# Patient Record
Sex: MALE | Race: ASIAN | Employment: OTHER | URBAN - METROPOLITAN AREA
[De-identification: names, ages, dates, MRNs, and addresses within clinical notes are randomized per-mention and may not be internally consistent; named-entity substitution may affect disease eponyms.]

---

## 2018-11-16 ENCOUNTER — APPOINTMENT (OUTPATIENT)
Dept: LAB | Age: 43
End: 2018-11-16
Attending: INTERNAL MEDICINE
Payer: COMMERCIAL

## 2018-11-16 ENCOUNTER — OFFICE VISIT (OUTPATIENT)
Dept: INTERNAL MEDICINE CLINIC | Facility: CLINIC | Age: 43
End: 2018-11-16
Payer: COMMERCIAL

## 2018-11-16 VITALS
WEIGHT: 154 LBS | HEART RATE: 64 BPM | SYSTOLIC BLOOD PRESSURE: 120 MMHG | RESPIRATION RATE: 12 BRPM | TEMPERATURE: 98 F | BODY MASS INDEX: 28.34 KG/M2 | HEIGHT: 62 IN | DIASTOLIC BLOOD PRESSURE: 72 MMHG

## 2018-11-16 DIAGNOSIS — E78.5 HYPERLIPIDEMIA, UNSPECIFIED HYPERLIPIDEMIA TYPE: ICD-10-CM

## 2018-11-16 DIAGNOSIS — I10 ESSENTIAL HYPERTENSION: Primary | ICD-10-CM

## 2018-11-16 DIAGNOSIS — R73.01 IMPAIRED FASTING GLUCOSE: ICD-10-CM

## 2018-11-16 DIAGNOSIS — I10 ESSENTIAL HYPERTENSION: ICD-10-CM

## 2018-11-16 PROCEDURE — 80061 LIPID PANEL: CPT | Performed by: INTERNAL MEDICINE

## 2018-11-16 PROCEDURE — 80053 COMPREHEN METABOLIC PANEL: CPT | Performed by: INTERNAL MEDICINE

## 2018-11-16 PROCEDURE — 99204 OFFICE O/P NEW MOD 45 MIN: CPT | Performed by: INTERNAL MEDICINE

## 2018-11-16 PROCEDURE — 83036 HEMOGLOBIN GLYCOSYLATED A1C: CPT

## 2018-11-16 PROCEDURE — 84443 ASSAY THYROID STIM HORMONE: CPT

## 2018-11-16 PROCEDURE — 36415 COLL VENOUS BLD VENIPUNCTURE: CPT

## 2018-11-16 PROCEDURE — 85025 COMPLETE CBC W/AUTO DIFF WBC: CPT | Performed by: INTERNAL MEDICINE

## 2018-11-16 RX ORDER — AMLODIPINE BESYLATE 5 MG/1
5 TABLET ORAL DAILY
COMMUNITY
End: 2018-11-16

## 2018-11-16 RX ORDER — SIMVASTATIN 10 MG
10 TABLET ORAL NIGHTLY
COMMUNITY
End: 2018-11-16

## 2018-11-16 RX ORDER — SIMVASTATIN 10 MG
10 TABLET ORAL NIGHTLY
Qty: 30 TABLET | Refills: 3 | Status: SHIPPED | OUTPATIENT
Start: 2018-11-16 | End: 2019-04-07

## 2018-11-16 RX ORDER — LOSARTAN POTASSIUM 50 MG/1
50 TABLET ORAL DAILY
Qty: 30 TABLET | Refills: 3 | Status: SHIPPED | OUTPATIENT
Start: 2018-11-16 | End: 2019-03-08

## 2018-11-16 RX ORDER — LOSARTAN POTASSIUM 50 MG/1
TABLET ORAL DAILY
COMMUNITY
End: 2018-11-16

## 2018-11-16 RX ORDER — AMLODIPINE BESYLATE 5 MG/1
5 TABLET ORAL DAILY
Qty: 30 TABLET | Refills: 3 | Status: SHIPPED | OUTPATIENT
Start: 2018-11-16 | End: 2019-03-08

## 2018-11-16 NOTE — PROGRESS NOTES
[facility]    CHIEF COMPLAINT:  Patient presents with:  Medication Follow-Up: regarding simvastatin, amlodopine and Losartan. [facility] PCP here in [address], doesn't remember name. Last OV was 4 months ago.    Pt thinks last tetanus was 10/17  Imm/Inj: dec Single      Spouse name: Not on file      Number of children: Not on file      Years of education: Not on file      Highest education level: Not on file    Social Needs      Financial resource strain: Not on file      Food insecurity - worry: Not on file denies dysuria  MUSCULOSKELETAL: denies back pain  NEURO: denies headaches  PSYCHE: denies depression or anxiety  HEMATOLOGIC: denies hx of anemia  ENDOCRINE: denies thyroid history  ALL/ASTHMA: denies hx of allergy or asthma        EXERCISE ASSESSMENT AND Impaired fasting glucose  Will check a1c  Continue regular exercise.    Low carb diet.   - HEMOGLOBIN A1C; Future    RTC 3 months for med check    Oscar Cid MD

## 2018-11-28 ENCOUNTER — OFFICE VISIT (OUTPATIENT)
Dept: INTERNAL MEDICINE CLINIC | Facility: CLINIC | Age: 43
End: 2018-11-28
Payer: COMMERCIAL

## 2018-11-28 VITALS
HEART RATE: 80 BPM | RESPIRATION RATE: 12 BRPM | DIASTOLIC BLOOD PRESSURE: 78 MMHG | BODY MASS INDEX: 28.71 KG/M2 | TEMPERATURE: 98 F | WEIGHT: 156 LBS | SYSTOLIC BLOOD PRESSURE: 136 MMHG | HEIGHT: 62 IN

## 2018-11-28 DIAGNOSIS — M79.652 LEFT THIGH PAIN: Primary | ICD-10-CM

## 2018-11-28 PROCEDURE — 99213 OFFICE O/P EST LOW 20 MIN: CPT | Performed by: INTERNAL MEDICINE

## 2018-11-28 NOTE — PROGRESS NOTES
[facility]    CHIEF COMPLAINT:  Patient presents with:  Leg Pain: was experiening pain in left thigh on Monday. Pt experiencing minor pain at this time. Pt took Gretta Walter for the pain.    Note For Work: didn't go to work on Tuesday today, or tomorrow HgbA1C 4.4 <5.7 %    Estimated Average Glucose 80 68 - 126 mg/dL            ASSESSMENT AND PLAN:  1. Left thigh pain  Likely musculoskeletal strain. Improved pain now. Okay to go back to work. Monitor. rtc if worsens.    He knows in the future to com

## 2018-12-03 ENCOUNTER — TELEPHONE (OUTPATIENT)
Dept: INTERNAL MEDICINE CLINIC | Facility: CLINIC | Age: 43
End: 2018-12-03

## 2018-12-19 ENCOUNTER — TELEPHONE (OUTPATIENT)
Dept: INTERNAL MEDICINE CLINIC | Facility: CLINIC | Age: 43
End: 2018-12-19

## 2018-12-19 DIAGNOSIS — R74.8 ELEVATED LIVER ENZYMES: Primary | ICD-10-CM

## 2018-12-20 ENCOUNTER — APPOINTMENT (OUTPATIENT)
Dept: LAB | Age: 43
End: 2018-12-20
Attending: INTERNAL MEDICINE
Payer: COMMERCIAL

## 2018-12-20 PROCEDURE — 36415 COLL VENOUS BLD VENIPUNCTURE: CPT | Performed by: INTERNAL MEDICINE

## 2018-12-20 PROCEDURE — 80076 HEPATIC FUNCTION PANEL: CPT | Performed by: INTERNAL MEDICINE

## 2018-12-28 ENCOUNTER — LABORATORY ENCOUNTER (OUTPATIENT)
Dept: LAB | Age: 43
End: 2018-12-28
Attending: INTERNAL MEDICINE
Payer: COMMERCIAL

## 2018-12-28 DIAGNOSIS — R77.9 ELEVATED BLOOD PROTEIN: ICD-10-CM

## 2018-12-28 PROCEDURE — 36415 COLL VENOUS BLD VENIPUNCTURE: CPT

## 2018-12-28 PROCEDURE — 84165 PROTEIN E-PHORESIS SERUM: CPT

## 2018-12-28 PROCEDURE — 86334 IMMUNOFIX E-PHORESIS SERUM: CPT

## 2018-12-28 PROCEDURE — 83883 ASSAY NEPHELOMETRY NOT SPEC: CPT

## 2019-01-01 LAB
ALBUMIN SERPL-MCNC: 5.05 G/DL (ref 3.1–4.5)
ALBUMIN/GLOB SERPL: 1.99 {RATIO}
ALPHA1 GLOB SERPL ELPH-MCNC: 0.15 G/DL (ref 0.1–0.3)
ALPHA2 GLOB SERPL ELPH-MCNC: 0.6 G/DL (ref 0.6–1)
B-GLOBULIN SERPL ELPH-MCNC: 0.69 G/DL (ref 0.7–1.2)
GAMMA GLOB SERPL ELPH-MCNC: 1.1 G/DL (ref 0.6–1.6)
KAPPA FREE LIGHT CHAIN: 0.92 MG/DL (ref 0.33–1.94)
KAPPA/LAMBDA FLC RATIO: 0.49 (ref 0.26–1.65)
LAMBDA FREE LIGHT CHAIN: 1.88 MG/DL (ref 0.57–2.63)
MAI PROTEIN SERPL-MCNC: 7.6 G/DL (ref 6.1–8.3)

## 2019-02-19 DIAGNOSIS — I10 ESSENTIAL HYPERTENSION: ICD-10-CM

## 2019-02-20 RX ORDER — LOSARTAN POTASSIUM 50 MG/1
TABLET ORAL
Qty: 30 TABLET | Refills: 2 | OUTPATIENT
Start: 2019-02-20

## 2019-02-20 RX ORDER — AMLODIPINE BESYLATE 5 MG/1
TABLET ORAL
Qty: 30 TABLET | Refills: 2 | OUTPATIENT
Start: 2019-02-20

## 2019-02-22 ENCOUNTER — OFFICE VISIT (OUTPATIENT)
Dept: INTERNAL MEDICINE CLINIC | Facility: CLINIC | Age: 44
End: 2019-02-22
Payer: COMMERCIAL

## 2019-02-22 VITALS
DIASTOLIC BLOOD PRESSURE: 76 MMHG | HEART RATE: 72 BPM | SYSTOLIC BLOOD PRESSURE: 114 MMHG | HEIGHT: 62 IN | WEIGHT: 157.31 LBS | TEMPERATURE: 98 F | BODY MASS INDEX: 28.95 KG/M2 | RESPIRATION RATE: 12 BRPM

## 2019-02-22 DIAGNOSIS — E78.5 HYPERLIPIDEMIA, UNSPECIFIED HYPERLIPIDEMIA TYPE: ICD-10-CM

## 2019-02-22 DIAGNOSIS — K21.9 GASTROESOPHAGEAL REFLUX DISEASE, ESOPHAGITIS PRESENCE NOT SPECIFIED: ICD-10-CM

## 2019-02-22 DIAGNOSIS — I10 ESSENTIAL HYPERTENSION: Primary | ICD-10-CM

## 2019-02-22 PROCEDURE — 99214 OFFICE O/P EST MOD 30 MIN: CPT | Performed by: INTERNAL MEDICINE

## 2019-02-22 NOTE — PROGRESS NOTES
[facility]    CHIEF COMPLAINT:  Patient presents with:  Medication Follow-Up        HISTORY OF PRESENT ILLNESS:  Here for med check. Htn: at goal. Taking meds regularly. No chest pain, no shortness of breath. Hyperlipidemia: on statin.  No m Beta Globulin 0.69 (L) 0.70 - 1.20 g/dL    Gamma Globulin 1.10 0.60 - 1.60 g/dL    A/G Ratio 1.99     Protein Elect Interpretation       No apparent monoclonal protein on serum electrophoresis. Reviewed by Emile Antunez M.D.  Pathology 01/01/19 at 12

## 2019-03-08 DIAGNOSIS — I10 ESSENTIAL HYPERTENSION: ICD-10-CM

## 2019-03-08 RX ORDER — AMLODIPINE BESYLATE 5 MG/1
TABLET ORAL
Qty: 30 TABLET | Refills: 2 | Status: SHIPPED | OUTPATIENT
Start: 2019-03-08 | End: 2019-05-07

## 2019-03-08 RX ORDER — LOSARTAN POTASSIUM 50 MG/1
TABLET ORAL
Qty: 30 TABLET | Refills: 2 | Status: SHIPPED | OUTPATIENT
Start: 2019-03-08 | End: 2019-05-07

## 2019-04-07 DIAGNOSIS — E78.5 HYPERLIPIDEMIA, UNSPECIFIED HYPERLIPIDEMIA TYPE: ICD-10-CM

## 2019-04-09 RX ORDER — SIMVASTATIN 10 MG
TABLET ORAL
Qty: 30 TABLET | Refills: 0 | Status: SHIPPED | OUTPATIENT
Start: 2019-04-09 | End: 2019-05-03

## 2019-05-02 ENCOUNTER — LABORATORY ENCOUNTER (OUTPATIENT)
Dept: LAB | Age: 44
End: 2019-05-02
Attending: INTERNAL MEDICINE
Payer: COMMERCIAL

## 2019-05-02 ENCOUNTER — TELEPHONE (OUTPATIENT)
Dept: INTERNAL MEDICINE CLINIC | Facility: CLINIC | Age: 44
End: 2019-05-02

## 2019-05-02 DIAGNOSIS — E78.5 HYPERLIPIDEMIA, UNSPECIFIED HYPERLIPIDEMIA TYPE: ICD-10-CM

## 2019-05-02 PROCEDURE — 36415 COLL VENOUS BLD VENIPUNCTURE: CPT

## 2019-05-02 PROCEDURE — 80061 LIPID PANEL: CPT

## 2019-05-03 DIAGNOSIS — E78.5 HYPERLIPIDEMIA, UNSPECIFIED HYPERLIPIDEMIA TYPE: ICD-10-CM

## 2019-05-03 RX ORDER — SIMVASTATIN 20 MG
20 TABLET ORAL NIGHTLY
Qty: 90 TABLET | Refills: 0 | Status: SHIPPED | OUTPATIENT
Start: 2019-05-03 | End: 2019-05-07 | Stop reason: DRUGHIGH

## 2019-05-03 NOTE — TELEPHONE ENCOUNTER
----- Message from [facility] sent at 5/2/2019  2:21 PM CDT -----  Hey Dr. Dru Skinner,  This patient was supposed to come in for a PE on May.   The patient called and pushed his PE out to September because he is going to the [facility] for starting this week unt

## 2019-05-07 DIAGNOSIS — I10 ESSENTIAL HYPERTENSION: ICD-10-CM

## 2019-05-07 DIAGNOSIS — E78.5 HYPERLIPIDEMIA, UNSPECIFIED HYPERLIPIDEMIA TYPE: ICD-10-CM

## 2019-05-07 RX ORDER — EZETIMIBE 10 MG/1
10 TABLET ORAL NIGHTLY
Qty: 90 TABLET | Refills: 0 | Status: ON HOLD | OUTPATIENT
Start: 2019-05-07 | End: 2020-05-05

## 2019-05-07 RX ORDER — SIMVASTATIN 10 MG
TABLET ORAL
Qty: 90 TABLET | Refills: 0 | Status: SHIPPED | OUTPATIENT
Start: 2019-05-07 | End: 2019-05-08

## 2019-05-07 NOTE — TELEPHONE ENCOUNTER
Was patient advised to contact pharmacy directly?: Yes     Is patient out of meds or supply very low?: Patient Leaving out of town May 20th will need refill before then     Medication Requested: Losartan, Simvastatin and Amlodipine     Is patient requestin

## 2019-05-08 ENCOUNTER — TELEPHONE (OUTPATIENT)
Dept: INTERNAL MEDICINE CLINIC | Facility: CLINIC | Age: 44
End: 2019-05-08

## 2019-05-08 RX ORDER — AMLODIPINE BESYLATE 5 MG/1
TABLET ORAL
Qty: 30 TABLET | Refills: 1 | OUTPATIENT
Start: 2019-05-08

## 2019-05-08 RX ORDER — SIMVASTATIN 10 MG
TABLET ORAL
Qty: 90 TABLET | Refills: 0 | Status: SHIPPED | OUTPATIENT
Start: 2019-05-08 | End: 2020-03-26

## 2019-05-08 RX ORDER — LOSARTAN POTASSIUM 50 MG/1
TABLET ORAL
Qty: 90 TABLET | Refills: 0 | Status: SHIPPED | OUTPATIENT
Start: 2019-05-08 | End: 2020-03-27

## 2019-05-08 RX ORDER — AMLODIPINE BESYLATE 5 MG/1
TABLET ORAL
Qty: 90 TABLET | Refills: 0 | Status: SHIPPED | OUTPATIENT
Start: 2019-05-08 | End: 2020-03-27

## 2019-05-08 NOTE — TELEPHONE ENCOUNTER
Attempted to reach pharmacy, no answer, on hold for extended period, left detailed msg regarding the simvastatin dosing change with the addition of ezetimibe. These were noted in \"notes to pharmacy\" when new scripts were ordered.  To call back if still wi

## 2019-05-08 NOTE — TELEPHONE ENCOUNTER
[address], [address] [place] [address], [phone] following up regarding Simvastatin medication calling to clarify dosage/supply. Please advise. Thank you!

## 2019-06-11 DIAGNOSIS — I10 ESSENTIAL HYPERTENSION: ICD-10-CM

## 2019-06-11 RX ORDER — AMLODIPINE BESYLATE 5 MG/1
TABLET ORAL
Qty: 30 TABLET | Refills: 1 | OUTPATIENT
Start: 2019-06-11

## 2019-06-11 NOTE — TELEPHONE ENCOUNTER
rx denied, too soon:  To pharmacy: Check patient holds; ordered to same pharmacy 5/8/19 #90, no refills

## 2020-03-26 DIAGNOSIS — E78.5 HYPERLIPIDEMIA, UNSPECIFIED HYPERLIPIDEMIA TYPE: ICD-10-CM

## 2020-03-26 DIAGNOSIS — I10 ESSENTIAL HYPERTENSION: ICD-10-CM

## 2020-03-26 NOTE — TELEPHONE ENCOUNTER
Did the patient contact the pharmacy directly?:  No - no refills    Is patient out of meds or supply very low?:  low    Medication and Dose Requested:  Losartan Potassium 50 MG Oral Tab                                                    simvastatin 10 MG O

## 2020-03-26 NOTE — TELEPHONE ENCOUNTER
Last OV relevant to medication: 2/22/19  Last refill date: 5/8/19     #/refills: 90/0  When pt was asked to return for OV: 3 months  Upcoming appt/reason: 5/22/2020, PE    Lab Results   Component Value Date    GLU 99 11/16/2018    BUN 18 11/16/2018    [facility]

## 2020-03-27 RX ORDER — AMLODIPINE BESYLATE 5 MG/1
TABLET ORAL
Qty: 90 TABLET | Refills: 0 | Status: ON HOLD | OUTPATIENT
Start: 2020-03-27 | End: 2020-05-05

## 2020-03-27 RX ORDER — SIMVASTATIN 10 MG
TABLET ORAL
Qty: 90 TABLET | Refills: 0 | Status: ON HOLD | OUTPATIENT
Start: 2020-03-27 | End: 2020-05-05

## 2020-03-27 RX ORDER — LOSARTAN POTASSIUM 50 MG/1
TABLET ORAL
Qty: 90 TABLET | Refills: 0 | Status: ON HOLD | OUTPATIENT
Start: 2020-03-27 | End: 2020-05-05

## 2020-03-27 NOTE — TELEPHONE ENCOUNTER
PE scheduled for 5/2020, cannot be seen sooner due to covid pandemic. Refill done. Will need labs prior to next refill if we are unable to see him in May due to continuing pandemic concerns.

## 2020-03-31 ENCOUNTER — APPOINTMENT (OUTPATIENT)
Dept: GENERAL RADIOLOGY | Facility: HOSPITAL | Age: 45
End: 2020-03-31
Attending: NURSE PRACTITIONER
Payer: COMMERCIAL

## 2020-03-31 ENCOUNTER — HOSPITAL ENCOUNTER (EMERGENCY)
Facility: HOSPITAL | Age: 45
Discharge: HOME | End: 2020-03-31
Attending: EMERGENCY MEDICINE
Payer: COMMERCIAL

## 2020-03-31 VITALS
WEIGHT: 165.38 LBS | TEMPERATURE: 102 F | HEIGHT: 64 IN | OXYGEN SATURATION: 99 % | DIASTOLIC BLOOD PRESSURE: 71 MMHG | SYSTOLIC BLOOD PRESSURE: 114 MMHG | HEART RATE: 99 BPM | RESPIRATION RATE: 27 BRPM | BODY MASS INDEX: 28.24 KG/M2

## 2020-03-31 DIAGNOSIS — J22 LOWER RESPIRATORY INFECTION: ICD-10-CM

## 2020-03-31 DIAGNOSIS — B34.9 VIRAL SYNDROME: Primary | ICD-10-CM

## 2020-03-31 PROCEDURE — 96360 HYDRATION IV INFUSION INIT: CPT

## 2020-03-31 PROCEDURE — 87999 UNLISTED MICROBIOLOGY PX: CPT

## 2020-03-31 PROCEDURE — 96361 HYDRATE IV INFUSION ADD-ON: CPT

## 2020-03-31 PROCEDURE — 99284 EMERGENCY DEPT VISIT MOD MDM: CPT

## 2020-03-31 PROCEDURE — 71045 X-RAY EXAM CHEST 1 VIEW: CPT | Performed by: NURSE PRACTITIONER

## 2020-03-31 RX ORDER — BENZONATATE 100 MG/1
100 CAPSULE ORAL 3 TIMES DAILY PRN
Qty: 30 CAPSULE | Refills: 0 | Status: ON HOLD | OUTPATIENT
Start: 2020-03-31 | End: 2020-05-05

## 2020-03-31 RX ORDER — ALBUTEROL SULFATE 90 UG/1
2 AEROSOL, METERED RESPIRATORY (INHALATION) EVERY 4 HOURS PRN
Qty: 1 INHALER | Refills: 0 | Status: ON HOLD | OUTPATIENT
Start: 2020-03-31 | End: 2020-05-05

## 2020-03-31 RX ORDER — AZITHROMYCIN 250 MG/1
TABLET, FILM COATED ORAL
Qty: 1 PACKAGE | Refills: 0 | Status: ON HOLD | OUTPATIENT
Start: 2020-03-31 | End: 2020-05-05

## 2020-03-31 RX ORDER — ACETAMINOPHEN 500 MG
1000 TABLET ORAL ONCE
Status: COMPLETED | OUTPATIENT
Start: 2020-03-31 | End: 2020-03-31

## 2020-03-31 NOTE — ED PROVIDER NOTES
Patient Seen in: [facility] Emergency Department      History   Patient presents with:  Cough/URI  Fever  Headache    Stated Complaint: [address]   PARENTS IN HOSPITAL WITH POSITIVE COVID    64-year-old male presents tod (38.7 °C)   Temp src Temporal   SpO2 97 %   O2 Device None (Room air)       Current:/74   Pulse 100   Temp (!) 101.6 °F (38.7 °C) (Temporal)   Resp (!) 30   Ht 162.6 cm (5' 4\")   Wt 75 kg   SpO2 97%   BMI 28.38 kg/m²         Physical Exam  Vitals si 3/31/2020 at 9:45 AM                MDM     Presented today with complaints of fever cough and some shortness of breath. Has been exposed to his parents who both are hospitalized with COVID-19.   Chest x-ray did confirm signs of COVID-19 infection with kandy

## 2020-03-31 NOTE — ED PROVIDER NOTES
The patient's case was discussed with me by JAMIL caldwell. I evaluated the patient.   He has been ill for 1 week with fevers, headaches and cough, and presents to the emergency department today primarily because he is having difficulty sleeping be

## 2020-04-02 ENCOUNTER — ANESTHESIA (OUTPATIENT)
Dept: MEDSURG UNIT | Facility: HOSPITAL | Age: 45
DRG: 003 | End: 2020-04-02
Payer: COMMERCIAL

## 2020-04-02 ENCOUNTER — APPOINTMENT (OUTPATIENT)
Dept: GENERAL RADIOLOGY | Facility: HOSPITAL | Age: 45
DRG: 003 | End: 2020-04-02
Attending: EMERGENCY MEDICINE
Payer: COMMERCIAL

## 2020-04-02 ENCOUNTER — APPOINTMENT (OUTPATIENT)
Dept: GENERAL RADIOLOGY | Facility: HOSPITAL | Age: 45
DRG: 003 | End: 2020-04-02
Attending: NURSE PRACTITIONER
Payer: COMMERCIAL

## 2020-04-02 ENCOUNTER — ANESTHESIA EVENT (OUTPATIENT)
Dept: MEDSURG UNIT | Facility: HOSPITAL | Age: 45
DRG: 003 | End: 2020-04-02
Payer: COMMERCIAL

## 2020-04-02 ENCOUNTER — HOSPITAL ENCOUNTER (INPATIENT)
Facility: HOSPITAL | Age: 45
LOS: 33 days | Discharge: NURSING, REHAB OR LONG-TERM CARE | DRG: 003 | End: 2020-05-05
Attending: EMERGENCY MEDICINE | Admitting: HOSPITALIST
Payer: COMMERCIAL

## 2020-04-02 DIAGNOSIS — U07.1 PNEUMONIA DUE TO 2019 NOVEL CORONAVIRUS: Primary | ICD-10-CM

## 2020-04-02 DIAGNOSIS — J12.82 PNEUMONIA DUE TO 2019 NOVEL CORONAVIRUS: Primary | ICD-10-CM

## 2020-04-02 PROCEDURE — 71045 X-RAY EXAM CHEST 1 VIEW: CPT | Performed by: EMERGENCY MEDICINE

## 2020-04-02 PROCEDURE — 02HV33Z INSERTION OF INFUSION DEVICE INTO SUPERIOR VENA CAVA, PERCUTANEOUS APPROACH: ICD-10-PCS | Performed by: HOSPITALIST

## 2020-04-02 PROCEDURE — 5A1955Z RESPIRATORY VENTILATION, GREATER THAN 96 CONSECUTIVE HOURS: ICD-10-PCS | Performed by: ANESTHESIOLOGY

## 2020-04-02 PROCEDURE — B548ZZA ULTRASONOGRAPHY OF SUPERIOR VENA CAVA, GUIDANCE: ICD-10-PCS | Performed by: HOSPITALIST

## 2020-04-02 PROCEDURE — 0D9670Z DRAINAGE OF STOMACH WITH DRAINAGE DEVICE, VIA NATURAL OR ARTIFICIAL OPENING: ICD-10-PCS | Performed by: HOSPITALIST

## 2020-04-02 PROCEDURE — 71045 X-RAY EXAM CHEST 1 VIEW: CPT | Performed by: NURSE PRACTITIONER

## 2020-04-02 PROCEDURE — 99291 CRITICAL CARE FIRST HOUR: CPT | Performed by: HOSPITALIST

## 2020-04-02 PROCEDURE — 0BH18EZ INSERTION OF ENDOTRACHEAL AIRWAY INTO TRACHEA, VIA NATURAL OR ARTIFICIAL OPENING ENDOSCOPIC: ICD-10-PCS | Performed by: ANESTHESIOLOGY

## 2020-04-02 RX ORDER — ACETAMINOPHEN 650 MG/1
650 SUPPOSITORY RECTAL EVERY 6 HOURS PRN
Status: DISCONTINUED | OUTPATIENT
Start: 2020-04-02 | End: 2020-05-05

## 2020-04-02 RX ORDER — SODIUM CHLORIDE 0.9 % (FLUSH) 0.9 %
10 SYRINGE (ML) INJECTION AS NEEDED
Status: DISCONTINUED | OUTPATIENT
Start: 2020-04-02 | End: 2020-05-01

## 2020-04-02 RX ORDER — SODIUM CHLORIDE 9 MG/ML
INJECTION, SOLUTION INTRAVENOUS CONTINUOUS
Status: DISCONTINUED | OUTPATIENT
Start: 2020-04-02 | End: 2020-04-03

## 2020-04-02 RX ORDER — DEXMEDETOMIDINE HYDROCHLORIDE 4 UG/ML
INJECTION, SOLUTION INTRAVENOUS CONTINUOUS
Status: DISCONTINUED | OUTPATIENT
Start: 2020-04-02 | End: 2020-04-05

## 2020-04-02 RX ORDER — ENOXAPARIN SODIUM 100 MG/ML
40 INJECTION SUBCUTANEOUS DAILY
Status: DISCONTINUED | OUTPATIENT
Start: 2020-04-02 | End: 2020-04-02

## 2020-04-02 RX ORDER — ACETAMINOPHEN 160 MG/5ML
650 SOLUTION ORAL EVERY 6 HOURS PRN
Status: DISCONTINUED | OUTPATIENT
Start: 2020-04-02 | End: 2020-04-02

## 2020-04-02 RX ORDER — SODIUM PHOSPHATE, DIBASIC AND SODIUM PHOSPHATE, MONOBASIC 7; 19 G/133ML; G/133ML
1 ENEMA RECTAL ONCE AS NEEDED
Status: DISCONTINUED | OUTPATIENT
Start: 2020-04-02 | End: 2020-05-05

## 2020-04-02 RX ORDER — ACETAMINOPHEN 325 MG/1
650 TABLET ORAL EVERY 6 HOURS PRN
Status: DISCONTINUED | OUTPATIENT
Start: 2020-04-02 | End: 2020-04-02

## 2020-04-02 RX ORDER — METOCLOPRAMIDE HYDROCHLORIDE 5 MG/ML
5 INJECTION INTRAMUSCULAR; INTRAVENOUS EVERY 8 HOURS PRN
Status: DISCONTINUED | OUTPATIENT
Start: 2020-04-02 | End: 2020-04-20

## 2020-04-02 RX ORDER — ACETAMINOPHEN 500 MG
TABLET ORAL
Status: COMPLETED
Start: 2020-04-02 | End: 2020-04-02

## 2020-04-02 RX ORDER — FAMOTIDINE 10 MG/ML
20 INJECTION, SOLUTION INTRAVENOUS 2 TIMES DAILY
Status: DISCONTINUED | OUTPATIENT
Start: 2020-04-02 | End: 2020-04-04

## 2020-04-02 RX ORDER — BISACODYL 10 MG
10 SUPPOSITORY, RECTAL RECTAL
Status: DISCONTINUED | OUTPATIENT
Start: 2020-04-02 | End: 2020-04-02

## 2020-04-02 RX ORDER — POLYETHYLENE GLYCOL 3350 17 G/17G
17 POWDER, FOR SOLUTION ORAL DAILY PRN
Status: DISCONTINUED | OUTPATIENT
Start: 2020-04-02 | End: 2020-04-30

## 2020-04-02 RX ORDER — SODIUM PHOSPHATE, DIBASIC AND SODIUM PHOSPHATE, MONOBASIC 7; 19 G/133ML; G/133ML
1 ENEMA RECTAL ONCE AS NEEDED
Status: DISCONTINUED | OUTPATIENT
Start: 2020-04-02 | End: 2020-04-02

## 2020-04-02 RX ORDER — ONDANSETRON 2 MG/ML
4 INJECTION INTRAMUSCULAR; INTRAVENOUS EVERY 6 HOURS PRN
Status: DISCONTINUED | OUTPATIENT
Start: 2020-04-02 | End: 2020-05-05

## 2020-04-02 RX ORDER — ACETAMINOPHEN 325 MG/1
650 TABLET ORAL EVERY 6 HOURS PRN
Status: DISCONTINUED | OUTPATIENT
Start: 2020-04-02 | End: 2020-04-06

## 2020-04-02 RX ORDER — ACETAMINOPHEN 650 MG/1
650 SUPPOSITORY RECTAL EVERY 6 HOURS PRN
Status: DISCONTINUED | OUTPATIENT
Start: 2020-04-02 | End: 2020-04-02

## 2020-04-02 RX ORDER — POLYETHYLENE GLYCOL 3350 17 G/17G
17 POWDER, FOR SOLUTION ORAL DAILY PRN
Status: DISCONTINUED | OUTPATIENT
Start: 2020-04-02 | End: 2020-04-02

## 2020-04-02 RX ORDER — CHLORHEXIDINE GLUCONATE 0.12 MG/ML
15 RINSE ORAL
Status: DISCONTINUED | OUTPATIENT
Start: 2020-04-02 | End: 2020-04-26

## 2020-04-02 RX ORDER — ETOMIDATE 2 MG/ML
INJECTION INTRAVENOUS AS NEEDED
Status: DISCONTINUED | OUTPATIENT
Start: 2020-04-02 | End: 2020-04-02 | Stop reason: SURG

## 2020-04-02 RX ORDER — HYDROXYCHLOROQUINE SULFATE 200 MG/1
400 TABLET, FILM COATED ORAL 2 TIMES DAILY
Status: DISCONTINUED | OUTPATIENT
Start: 2020-04-02 | End: 2020-04-02

## 2020-04-02 RX ORDER — ACETAMINOPHEN 325 MG/1
650 TABLET ORAL EVERY 6 HOURS PRN
Status: DISCONTINUED | OUTPATIENT
Start: 2020-04-02 | End: 2020-05-05

## 2020-04-02 RX ORDER — HYDROXYCHLOROQUINE SULFATE 200 MG/1
200 TABLET, FILM COATED ORAL 2 TIMES DAILY
Status: DISCONTINUED | OUTPATIENT
Start: 2020-04-03 | End: 2020-04-02

## 2020-04-02 RX ORDER — CHLORHEXIDINE GLUCONATE 0.12 MG/ML
15 RINSE ORAL
Status: DISCONTINUED | OUTPATIENT
Start: 2020-04-02 | End: 2020-04-02

## 2020-04-02 RX ORDER — ACETAMINOPHEN 500 MG
1000 TABLET ORAL ONCE
Status: COMPLETED | OUTPATIENT
Start: 2020-04-02 | End: 2020-04-02

## 2020-04-02 RX ORDER — ENOXAPARIN SODIUM 100 MG/ML
40 INJECTION SUBCUTANEOUS DAILY
Status: DISCONTINUED | OUTPATIENT
Start: 2020-04-02 | End: 2020-04-04

## 2020-04-02 RX ORDER — ACETAMINOPHEN 160 MG/5ML
650 SOLUTION ORAL EVERY 6 HOURS PRN
Status: DISCONTINUED | OUTPATIENT
Start: 2020-04-02 | End: 2020-05-05

## 2020-04-02 RX ORDER — SODIUM CHLORIDE 9 MG/ML
INJECTION, SOLUTION INTRAVENOUS ONCE
Status: COMPLETED | OUTPATIENT
Start: 2020-04-02 | End: 2020-04-02

## 2020-04-02 RX ORDER — BISACODYL 10 MG
10 SUPPOSITORY, RECTAL RECTAL
Status: DISCONTINUED | OUTPATIENT
Start: 2020-04-02 | End: 2020-05-05

## 2020-04-02 RX ADMIN — ETOMIDATE 40 MG: 2 INJECTION INTRAVENOUS at 14:40:00

## 2020-04-02 NOTE — ED INITIAL ASSESSMENT (HPI)
Pt Aox4. Pt presents to ed from home by himself. Pt presents tachypnic. Pt states was tested positive for COVID 2 days ago. Pt states I cannot sleep. Pt c/o fever and mason. Pt states onset of s/s last Tuesday.

## 2020-04-02 NOTE — RESPIRATORY THERAPY NOTE
Intubated pt with size 7.5 ETT @21 without incident. Vent setting 20/400/100%+10 peaks and plateaus within normal limits.

## 2020-04-02 NOTE — ANESTHESIA PREPROCEDURE EVALUATION
PRE-OP EVALUATION    Patient Name: Mello Joe    Pre-op Diagnosis: * No surgery found *    * No surgery found *    * Surgery not found *    Pre-op vitals reviewed.   Temp: 99.4 °F (37.4 °C)  Pulse: 100  Resp: 40  BP: 111/65  FiO2 (%): 100 %  SpO2: 88 %  Bod Continuous  propofol (DIPRIVAN) infusion, 5-100 mcg/kg/min, Intravenous, Continuous  famoTIDine (PEPCID) injection 20 mg, 20 mg, Intravenous, BID  tocilizumab (ACTEMRA) 400 mg in sodium chloride 0.9% 100 mL infusion, 400 mg, Intravenous, Once  0.9% NaCl in Neuro/Psych                                    Past Surgical History:   Procedure Laterality Date   • FEMUR FRACTURE SURGERY  2007   • SKIN SURGERY      lipoma removal right shoulder area and under left breast     Social History    [employer]

## 2020-04-02 NOTE — H&P
[facility]  History and Physical     Donovan Old Patient Status:  Emergency    1975 MRN [id number]   Location [address] Attending Ramesh Ellis MD   Hosp Day # 0 PCP Con Kelley MD     Chief Complaint: dyspnea Inhaler, Rfl: 0  amLODIPine Besylate 5 MG Oral Tab, TAKE 1 TABLET BY MOUTH ONE TIME A DAY, Disp: 90 tablet, Rfl: 0  losartan Potassium 50 MG Oral Tab, TAKE 1 TABLET BY MOUTH ONE TIME A DAY, Disp: 90 tablet, Rfl: 0  simvastatin 10 MG Oral Tab, TAKE 1 TABLET last 168 hours.     Recent Labs   Lab 04/02/20  1000        COVID-19 Lab Results    COVID-19  Lab Results   Component Value Date    COVID19 Detected (AA) 03/31/2020       Pro-Calcitonin  Lab Results   Component Value Date    PCT 0.22 (H) 04/02/2020

## 2020-04-02 NOTE — ED NOTES
Dr. Patino [facility]- Hospitalist at bedside. Received orders from Dr. Violet Mccormick for medications to be given by ICU.

## 2020-04-02 NOTE — ED PROVIDER NOTES
Patient Seen in: [facility] Emergency Department      History   Patient presents with:  Dyspnea ALEXANDER SOB  Dehydration    Stated Complaint: + COVID, ALEXANDER, dehydration    HPI    Patient is a 80-year-old male, remote smoking history, here for evaluation kg/m²         Physical Exam      Constitutional: Tired but not toxic, notably tachypneic. Laying in bed. Head: Normocephalic and atraumatic. Eyes: EOM are normal. Pupils are equal, round, and reactive to light. Neck: Normal range of motion.  Neck renteria DIFFERENTIAL - Abnormal; Notable for the following components:    RBC 3.44 (*)     HGB 10.9 (*)     HCT 30.9 (*)     Lymphocyte Absolute 0.87 (*)     All other components within normal limits   LACTIC ACID, PLASMA - Normal   CK CREATINE KINASE (NOT CREATIN (cpt=71045)    Result Date: 3/31/2020  PROCEDURE:  XR CHEST AP PORTABLE  (CPT=71045)  TECHNIQUE:  AP chest radiograph was obtained. COMPARISON:  None. INDICATIONS:  FEVER,CHILLS,LOSS OF APPETITE,HEADACHE.   [facility] [address] WITH POSITIVE COVID  PATIENT consultation.     [facility]    Case discussed  Admission disposition: 4/2/2020 11:35 AM               A total of 45 minutes of critical care time (exclusive of billable procedures) was administered to [facility]

## 2020-04-02 NOTE — ANESTHESIA PROCEDURE NOTES
Airway  Date/Time: 4/2/2020 2:40 PM  Urgency: elective    Airway not difficult    General Information and Staff    Patient location during procedure: ICU  Anesthesiologist: Olivia Rodriguez MD  Performed: anesthesiologist     Indications and Patient Condi

## 2020-04-02 NOTE — ED NOTES
Rn attempted to give report to [facility] ICU RN. Jaylyn Alcaraz unavailable at this time.

## 2020-04-02 NOTE — ANESTHESIA POSTPROCEDURE EVALUATION
[address] Patient Status:  Inpatient   Age/Gender 41year old male MRN [id number]   Location [facility] 4SW-A Attending Girish John MD   Hosp Day # 0 PCP Nino Whitley MD       Anesthesia Post-op Note    * No procedures listed *

## 2020-04-02 NOTE — ED NOTES
Pt placed in Reverse isolation room A0. Pt placed on NRB mask on 15L/O2. Pt tolerated well. Pt appears comfortable. Continuing to monitor patient. Received order for pt to be placed on High floor O2/NC. RN called RT.

## 2020-04-02 NOTE — CONSULTS
[facility]  Pulmonary/Critical Care Consult Note  [facility]  Report of Consultation    Roni Alcala Patient Status:  Inpatient    1975 MRN [id number]   [facility] 4SW-A Attending Roswell Sicard, M He has never used smokeless tobacco. He reports current alcohol use. He reports that he does not use drugs.     Allergies:  No Known Allergies    Medications:  • Hydroxychloroquine Sulfate  400 mg Oral BID    Followed by   • [START ON 4/3/2020] Hydroxychlor peak/plateau pressures  COR: RRR no murmur  GI: NABS X 4, S/NT/ND, No hernias or HSM  LYMPHATIC: No palpable or visible lymphadenopathy in neck, axillae, groin  MSK: limited exam due to intubation/sedation; full PROM  EXT: No overt deformities, No C/C/E, 2 markers - due to above  · Mild transamnitis   · HTN, HLD  · GERD    PLAN    · Continue close monitoring of hemodynamics, titrate vasopressors for goal MAP >65  · Continue COVID treatment regimen: azithromycin day 3 (started zpak 3/31), hydroxychloroquine d

## 2020-04-02 NOTE — PROGRESS NOTES
Goal: To achieve optimal ventilation and oxygenation.     INTERVENTIONS:  • Assess for changes in respiratory status  • Assess for changes in mentation and behavior  • Position to facilitate oxygenation and minimize respiratory effort  • Oxygen supplementat Pressure (cm H2O) 25 cm H2O   Measured From [facility] by Commercial tube robison   Site Condition Dry   Req'd equipment at bedside Bag mask     Recent Labs   Lab 04/02/20  1606   ABGPHT 7.31*   LPWMFS4I 33*   AVJCC2C 132*   AB

## 2020-04-02 NOTE — ANESTHESIA PROCEDURE NOTES
Arterial Line  Performed by: Eduard Yu MD  Authorized by: Eduard Yu MD     General Information and Staff    Procedure Start:  4/2/2020 2:45 PM  Procedure End:  4/2/2020 2:50 PM  Anesthesiologist:  Eduard Yu MD  Patient Location:

## 2020-04-02 NOTE — CONSULTS
INFECTIOUS DISEASE [address] Patient Status:  Inpatient    1975 MRN [id number]   [facility] 4SW-A Attending Belem Martell MD   Hosp Day # 0 PCP Mathieu Silva MD tab 650 mg, 650 mg, Oral, Q6H PRN **OR** acetaminophen (TYLENOL) 160 MG/5ML oral liquid 650 mg, 650 mg, Oral, Q6H PRN **OR** acetaminophen (TYLENOL) 650 MG rectal suppository 650 mg, 650 mg, Rectal, Q6H PRN  •  PEG 3350 (MIRALAX) powder packet 17 g, 17 g, facility-administered medications on file prior to encounter. benzonatate 100 MG Oral Cap, Take 1 capsule (100 mg total) by mouth 3 (three) times daily as needed for cough. , Disp: 30 capsule, Rfl: 0  azithromycin (ZITHROMAX Z-SALMA) 250 MG Oral Tab, 500 mg GFRAA 88 98   GFRNAA 76 85   CA 8.3* 7.4*   ALB 3.3*  --    * 136   K 3.8 4.0    110   CO2 21.0 18.0*   ALKPHO 35*  --    AST 60*  --    ALT 36  --    BILT 0.8  --    TP 7.5  --          Lab Results   Component Value Date    INR 1.01 04/02/20

## 2020-04-03 ENCOUNTER — APPOINTMENT (OUTPATIENT)
Dept: GENERAL RADIOLOGY | Facility: HOSPITAL | Age: 45
DRG: 003 | End: 2020-04-03
Attending: INTERNAL MEDICINE
Payer: COMMERCIAL

## 2020-04-03 ENCOUNTER — APPOINTMENT (OUTPATIENT)
Dept: GENERAL RADIOLOGY | Facility: HOSPITAL | Age: 45
DRG: 003 | End: 2020-04-03
Attending: NURSE PRACTITIONER
Payer: COMMERCIAL

## 2020-04-03 PROCEDURE — 71045 X-RAY EXAM CHEST 1 VIEW: CPT | Performed by: INTERNAL MEDICINE

## 2020-04-03 PROCEDURE — 99232 SBSQ HOSP IP/OBS MODERATE 35: CPT | Performed by: HOSPITALIST

## 2020-04-03 PROCEDURE — 71045 X-RAY EXAM CHEST 1 VIEW: CPT | Performed by: NURSE PRACTITIONER

## 2020-04-03 RX ORDER — MIDAZOLAM HYDROCHLORIDE 1 MG/ML
INJECTION INTRAMUSCULAR; INTRAVENOUS
Status: COMPLETED
Start: 2020-04-03 | End: 2020-04-03

## 2020-04-03 RX ORDER — MINERAL OIL AND PETROLATUM 150; 830 MG/G; MG/G
OINTMENT OPHTHALMIC 2 TIMES DAILY
Status: DISCONTINUED | OUTPATIENT
Start: 2020-04-03 | End: 2020-04-27

## 2020-04-03 RX ORDER — MIDAZOLAM HYDROCHLORIDE 1 MG/ML
2 INJECTION INTRAMUSCULAR; INTRAVENOUS ONCE
Status: COMPLETED | OUTPATIENT
Start: 2020-04-03 | End: 2020-04-03

## 2020-04-03 RX ORDER — MIDAZOLAM HYDROCHLORIDE 1 MG/ML
INJECTION INTRAMUSCULAR; INTRAVENOUS
Status: DISPENSED
Start: 2020-04-03 | End: 2020-04-04

## 2020-04-03 NOTE — PLAN OF CARE
Received pt from ED at 1400 on non-breather mask. SPO2 75%, tachyphenic rate 40-50. Oriented x 4. Placed on 15 L highflow without improvement.  Pt Intubated, OG, wesley and PICC placed immediately upon arrival. Pt fighting vent, coughing, restless Propofol

## 2020-04-03 NOTE — PROGRESS NOTES
Patient turned supine for chest xray. Patient dropped oxygen saturations to low 80's high 70's. Patient required ET suction; large amount of pink, frothy sputum suctioned from ET tube.

## 2020-04-03 NOTE — PROGRESS NOTES
[facility]                INFECTIOUS DISEASE PROGRESS NOTE    Jada Boggs Patient Status:  Inpatient    1975 MRN [id number]   [facility] 4SW-A Attending Wilmer Alcocer MD   Hosp Day # 1 PCP Issa Milton MD     Antibiotics: ce reviewed    Problem list reviewed:  Patient Active Problem List:     Hyperlipidemia     Benign essential HTN     Impaired fasting glucose     Pneumonia due to 2019 novel coronavirus     Acute hypoxemic respiratory failure (HCC)     Acute respiratory distre

## 2020-04-03 NOTE — PROGRESS NOTES
[facility]  Progress Note     Abdinica Limon Patient Status:  Inpatient    1975 MRN [id number]   [facility] 4SW-A Attending Ilir Luna MD   Hosp Day # 1 PCP Karishma Keller MD     Chief Complaint: resp failure    S: Patient Mine Bejarano • famoTIDine  20 mg Intravenous BID   • docusate sodium  100 mg Oral BID   • Chlorhexidine Gluconate  15 mL [email or web address]   • hydroxychloroquine sulfate  200 mg Oral BID   • cefTRIAXone  1 g Intravenous Q24H       ASSESSMENT / PLAN:     1.  A

## 2020-04-03 NOTE — DIETARY NOTE
[facility]    NUTRITION INITIAL ASSESSMENT    Pt does not meet malnutrition criteria. NUTRITION DIAGNOSIS/PROBLEM:  Inadequate oral intake related to physiological causes as evidenced by vent status, intubated, sedated, prone.     NUTRITION INTERVEN prescription    MEDICATIONS:  Azithromycin, hydroxychloroquine, propofol, precedex    LABS:  Ferritin 3,087, CRP 10.9    Pt is at high nutrition risk    FOLLOW-UP DATE:  4/6    Rohit Finney MS, RD, LDN  Critical Care Dietitian  Pager # [phone]

## 2020-04-03 NOTE — PLAN OF CARE
Assumed care of patient at approx 1930 last night. Patient sedated and intubated; unable to follow commands. Patient placed on rota-prone bed and proned at approx 2330 last night, tolerated well. Vital signs stable, patient has no s/s of pain.  Patient with

## 2020-04-03 NOTE — VASCULAR ACCESS
Xray report noted PICC too deep, three additional cm pulled out per report. Total 4cm out now. Redressed PICC. Tissue Adhesive re-applied.

## 2020-04-03 NOTE — PAYOR COMM NOTE
--------------'  Appropriate for inpatient status per guidelines for SOB due to COVID pneumonia - intubated.           ADMISSION REVIEW     Payor: [address] [place] [address] #:  [id number]  Authorization Number: [id number]       ED Prov Exam      Constitutional: Tired but not toxic, notably tachypneic. Laying in bed. Head: Normocephalic and atraumatic. Eyes: EOM are normal. Pupils are equal, round, and reactive to light. Neck: Normal range of motion. Neck supple. No JVD present. Abnormal; Notable for the following components:    RBC 3.44 (*)     HGB 10.9 (*)     HCT 30.9 (*)     Lymphocyte Absolute 0.87 (*)     All other components within normal limits   LACTIC ACID, PLASMA - Normal   CK CREATINE KINASE (NOT CREATININE) - Normal Impression:  Pneumonia due to 2019 novel coronavirus  (primary encounter diagnosis)    Disposition:  Admit  4/2/2020 11:35 am                 H&P - H&P Note          Chief Complaint: dyspnea    History of Present Illness: Lopez Villalobos is a 41year old male w 04/02/20  1000   WBC 8.5   HGB 10.9*   MCV 89.8   .0       Recent Labs   Lab 04/02/20  1000   *   BUN 11   CREATSERUM 1.16   GFRAA 88   GFRNAA 76   CA 8.3*   ALB 3.3*   *   K 3.8      CO2 21.0   ALKPHO 35*   AST 60*   ALT 36   KEYLA ventilator synchrony. If unable to attain goal sedation/ventilator synchrony or worsening respiratory status/hypoxia, proceed with paralytics.    · Monitor COVID labs/ inflammatory markers  · Place OGT and when able, start trickle feeds as tolerated  · Min

## 2020-04-03 NOTE — PROGRESS NOTES
[facility] Pulmonary/Critical Care Progress Note     SUBJECTIVE/Interval history: All events, procedures, notes reviewed.  Desaturated when turned supine earlier this morning with pink tinged secretions suctioned with im tenderness or deformity. Heart: RRR no murmur  Abdomen:Soft, non-tender. No masses. No guarding. No rebound.   Extremities:limited evaluation due to rotaprone bed and limbs covered    Lab Data Review:   Recent Labs   Lab 04/02/20  1000 04/02/20  1632 04 acetaminophen, PEG 3350, magnesium hydroxide, bisacodyl, Fleet Enema, Normal Saline Flush, fentanyl (SUBLIMAZE) infusion, acetaminophen, vasopressin (PITRESSIN) infusion for shock, ondansetron HCl, Metoclopramide HCl    COVID-19 Lab Results    COVID-19  La necessary  · Continue sedation and titrate accordingly for goal RASS 0 to -2 and ventilator synchrony. If unable to attain goal sedation/ventilator synchrony or worsening respiratory status/hypoxia, proceed with paralytics.    · Advance ETT to 23cm at teet

## 2020-04-03 NOTE — CM/SW NOTE
Lest message for Indira Rodrigues.     Татьяна Santos, [facility]  /Discharge Planner  [phone]

## 2020-04-04 ENCOUNTER — ANESTHESIA EVENT (OUTPATIENT)
Dept: CARDIAC SURGERY | Facility: HOSPITAL | Age: 45
DRG: 003 | End: 2020-04-04
Payer: COMMERCIAL

## 2020-04-04 ENCOUNTER — APPOINTMENT (OUTPATIENT)
Dept: CV DIAGNOSTICS | Facility: HOSPITAL | Age: 45
DRG: 003 | End: 2020-04-04
Attending: INTERNAL MEDICINE
Payer: COMMERCIAL

## 2020-04-04 ENCOUNTER — ANESTHESIA (OUTPATIENT)
Dept: CARDIAC SURGERY | Facility: HOSPITAL | Age: 45
DRG: 003 | End: 2020-04-04
Payer: COMMERCIAL

## 2020-04-04 PROCEDURE — 33952 ECMO/ECLS INSJ PRPH CANNULA: CPT | Performed by: INTERNAL MEDICINE

## 2020-04-04 PROCEDURE — 93306 TTE W/DOPPLER COMPLETE: CPT | Performed by: NURSE PRACTITIONER

## 2020-04-04 PROCEDURE — 99291 CRITICAL CARE FIRST HOUR: CPT | Performed by: INTERNAL MEDICINE

## 2020-04-04 PROCEDURE — 99292 CRITICAL CARE ADDL 30 MIN: CPT | Performed by: INTERNAL MEDICINE

## 2020-04-04 PROCEDURE — 99232 SBSQ HOSP IP/OBS MODERATE 35: CPT | Performed by: HOSPITALIST

## 2020-04-04 PROCEDURE — 33946 ECMO/ECLS INITIATION VENOUS: CPT | Performed by: INTERNAL MEDICINE

## 2020-04-04 PROCEDURE — 5A1522H EXTRACORPOREAL OXYGENATION, MEMBRANE, PERIPHERAL VENO-VENOUS: ICD-10-PCS | Performed by: INTERNAL MEDICINE

## 2020-04-04 RX ORDER — ALBUMIN, HUMAN INJ 5% 5 %
500 SOLUTION INTRAVENOUS ONCE
Status: COMPLETED | OUTPATIENT
Start: 2020-04-04 | End: 2020-04-04

## 2020-04-04 RX ORDER — MIDAZOLAM HYDROCHLORIDE 1 MG/ML
INJECTION INTRAMUSCULAR; INTRAVENOUS
Status: COMPLETED
Start: 2020-04-04 | End: 2020-04-04

## 2020-04-04 RX ORDER — MIDAZOLAM HYDROCHLORIDE 1 MG/ML
2 INJECTION INTRAMUSCULAR; INTRAVENOUS ONCE
Status: COMPLETED | OUTPATIENT
Start: 2020-04-04 | End: 2020-04-04

## 2020-04-04 RX ORDER — FUROSEMIDE 10 MG/ML
20 INJECTION INTRAMUSCULAR; INTRAVENOUS ONCE
Status: COMPLETED | OUTPATIENT
Start: 2020-04-04 | End: 2020-04-04

## 2020-04-04 RX ORDER — POTASSIUM CHLORIDE 14.9 MG/ML
20 INJECTION INTRAVENOUS AS NEEDED
Status: DISCONTINUED | OUTPATIENT
Start: 2020-04-04 | End: 2020-04-22 | Stop reason: HOSPADM

## 2020-04-04 RX ORDER — ALBUTEROL SULFATE 90 UG/1
8 AEROSOL, METERED RESPIRATORY (INHALATION)
Status: DISCONTINUED | OUTPATIENT
Start: 2020-04-04 | End: 2020-04-17

## 2020-04-04 RX ORDER — DEXMEDETOMIDINE HYDROCHLORIDE 4 UG/ML
INJECTION, SOLUTION INTRAVENOUS CONTINUOUS PRN
Status: DISCONTINUED | OUTPATIENT
Start: 2020-04-04 | End: 2020-04-04 | Stop reason: SURG

## 2020-04-04 RX ORDER — MAGNESIUM SULFATE 1 G/100ML
1 INJECTION INTRAVENOUS AS NEEDED
Status: DISCONTINUED | OUTPATIENT
Start: 2020-04-04 | End: 2020-04-22 | Stop reason: ALTCHOICE

## 2020-04-04 RX ORDER — FUROSEMIDE 10 MG/ML
20 INJECTION INTRAMUSCULAR; INTRAVENOUS
Status: DISCONTINUED | OUTPATIENT
Start: 2020-04-04 | End: 2020-04-05

## 2020-04-04 RX ORDER — MAGNESIUM SULFATE HEPTAHYDRATE 40 MG/ML
2 INJECTION, SOLUTION INTRAVENOUS AS NEEDED
Status: DISCONTINUED | OUTPATIENT
Start: 2020-04-04 | End: 2020-04-22 | Stop reason: ALTCHOICE

## 2020-04-04 RX ORDER — ALBUMIN, HUMAN INJ 5% 5 %
SOLUTION INTRAVENOUS
Status: DISPENSED
Start: 2020-04-04 | End: 2020-04-05

## 2020-04-04 RX ORDER — POTASSIUM CHLORIDE 29.8 MG/ML
40 INJECTION INTRAVENOUS AS NEEDED
Status: DISCONTINUED | OUTPATIENT
Start: 2020-04-04 | End: 2020-04-22 | Stop reason: HOSPADM

## 2020-04-04 RX ORDER — HEPARIN SODIUM 1000 [USP'U]/ML
INJECTION, SOLUTION INTRAVENOUS; SUBCUTANEOUS AS NEEDED
Status: DISCONTINUED | OUTPATIENT
Start: 2020-04-04 | End: 2020-04-04 | Stop reason: SURG

## 2020-04-04 RX ORDER — CISATRACURIUM BESYLATE 2 MG/ML
INJECTION INTRAVENOUS AS NEEDED
Status: DISCONTINUED | OUTPATIENT
Start: 2020-04-04 | End: 2020-04-04 | Stop reason: SURG

## 2020-04-04 RX ORDER — HEPARIN SODIUM AND DEXTROSE 10000; 5 [USP'U]/100ML; G/100ML
1000 INJECTION INTRAVENOUS CONTINUOUS
Status: DISCONTINUED | OUTPATIENT
Start: 2020-04-04 | End: 2020-04-14

## 2020-04-04 RX ORDER — HEPARIN SODIUM AND DEXTROSE 10000; 5 [USP'U]/100ML; G/100ML
800 INJECTION INTRAVENOUS CONTINUOUS
Status: DISCONTINUED | OUTPATIENT
Start: 2020-04-04 | End: 2020-04-04

## 2020-04-04 RX ADMIN — DEXMEDETOMIDINE HYDROCHLORIDE 1.5 MCG/KG/HR: 4 INJECTION, SOLUTION INTRAVENOUS at 14:00:00

## 2020-04-04 RX ADMIN — CISATRACURIUM BESYLATE 10 MG: 2 INJECTION INTRAVENOUS at 14:00:00

## 2020-04-04 RX ADMIN — HEPARIN SODIUM 15000 UNITS: 1000 INJECTION, SOLUTION INTRAVENOUS; SUBCUTANEOUS at 14:35:00

## 2020-04-04 NOTE — PROGRESS NOTES
Received patient from [place] [address] s/p ecmo placement near 1555. Ecmo flows 3.58-3.61, speed 3390 sweep of 3 and 100% oxygen. ACTs q1 hrs with orders to start heparin gtt at 800units/hr once ACT <160. Weaning support from ventilator as tolerated.  ABGs done and sen

## 2020-04-04 NOTE — PROGRESS NOTES
Prelim    Initiation of V-V ECMO with 29F Protec system via right IJ vein    RA 5  RV 38/5  PA 35/12    PCWP 8    Excellent flows - almost immediate normalization of acid-base and oxygenation    Wean vasopressor once back in CCU - keep MAP>60    Carlo/Stepha

## 2020-04-04 NOTE — CONSULTS
[facility]    Report of Consultation    Manpreet Rasrob Patient Status:  Inpatient    1975 MRN [id number]   [facility] 4SW-A Attending Mercy Bolaños MD   [facility] Day # 2 PCP Braden Nolasco MD     Date of Admission:  2020  Date of Hypertension Father    • Other (stent placement) Father 59   • Other (elevated blood sugar) Father    • Hypertension Paternal Uncle    • Diabetes Paternal Uncle    • Other (chf) Paternal Uncle       reports that he has quit smoking.  He has a 8.50 pack-year 50 mcg, Intravenous, Q30 Min PRN  •  acetaminophen (TYLENOL) tab 650 mg, 650 mg, Oral, Q6H PRN **OR** acetaminophen (TYLENOL) 160 MG/5ML oral liquid 650 mg, 650 mg, Oral, Q6H PRN **OR** acetaminophen (TYLENOL) 650 MG rectal suppository 650 mg, 650 mg, Rect °C)    Wt Readings from Last 3 Encounters:  04/04/20 : 166 lb 3.6 oz (75.4 kg)  03/31/20 : 165 lb 5.5 oz (75 kg)  02/22/19 : 157 lb 4.8 oz (71.4 kg)      Telemetry: sinus  General: intubated and sedated - limited access for exam  given prone ventilation st

## 2020-04-04 NOTE — PROGRESS NOTES
[facility] Pulmonary/Critical Care Progress Note     SUBJECTIVE/Interval history: All events, procedures, notes reviewed. Febrile overnight to 102, cultured and started on zosyn.  Also with wax/waning hypo- and hypertens neck;  trachea midline, no adenopathy; no thyromegaly   Lungs: diminished breath sounds. No crackles or wheeze heard. Plateau pressure of 30  Chest wall:No tenderness or deformity. Heart: RRR no murmur  Abdomen:Soft, non-tender. No masses. No guarding. piperacillin-tazobactam  3.375 g Intravenous Q8H   • azithromycin  500 mg Intravenous Q24H   • enoxaparin  40 mg Subcutaneous Daily   • famoTIDine  20 mg Intravenous BID   • docusate sodium  100 mg Oral BID   • Chlorhexidine Gluconate  15 mL Mouth/Throat B due to above  · transamnitis   · Diarrhea likely viral infection related; improved  · nonanion gap metabolic acidosis likely related to diarrhea  · HTN, HLD  · GERD     PLAN     · Continue close monitoring of hemodynamics, titrate vasopressors for goal MAP

## 2020-04-04 NOTE — ANESTHESIA POSTPROCEDURE EVALUATION
[address] Patient Status:  Inpatient   Age/Gender 41year old male MRN [id number]   [facility] 4SW-A Attending Mercy Bolaños MD   Hosp Day # 2 PCP Braden Nolasco MD       Anesthesia Post-op Note    Procedure(s):   Insertion

## 2020-04-04 NOTE — PLAN OF CARE
Assumed care at shift change. Pt sedated on propofol, precedex, fentanyl. Intubated. Stacking breaths occasionally and desaturating. Given fentanyl boluses with propofol and precedex and fentanyl maxed out. Notified Noy Ortiz APN. Order to paralyze patient.

## 2020-04-04 NOTE — ANESTHESIA PREPROCEDURE EVALUATION
PRE-OP EVALUATION    Patient Name: Camron Harden    Pre-op Diagnosis: ecmo    Procedure(s): Insertion of Right IJ Ecmo    Surgeon(s) and Role:     * Colleen Villa MD - Primary     * Valerio Frances MD - Assisting Surgeon    Pre-op vitals reviewed.   Temp: 9 Sod-Tazobactam So (ZOSYN) 3.375 g in dextrose 5 % 100 mL ADD-vantage, 3.375 g, Intravenous, Q8H  [COMPLETED] Midazolam HCl (VERSED) 2 MG/2ML injection 2 mg, 2 mg, Intravenous, Once  [COMPLETED] 0.9% NaCl infusion, , Intravenous, Once  [COMPLETED] acetamino [email or web address]  [COMPLETED] hydroxychloroquine sulfate (PLAQUENIL) suspension 400 mg, 400 mg, Oral, BID    Followed by  hydroxychloroquine sulfate (PLAQUENIL) suspension 200 mg, 200 mg, Oral, BID  [MAR Hold] Normal Saline Flush 0.9 % injection 10 mL, 10 mL, covid resp failure, fentanyl/propofol/precedex/nimbex infusions- intubated/sedated/paralyzed      Past Surgical History:   Procedure Laterality Date   • FEMUR FRACTURE SURGERY  2007   • SKIN SURGERY      lipoma removal right shoulder are

## 2020-04-04 NOTE — PLAN OF CARE
Assumed care at shift change. Patient medically paralyzed. Utilizing BIS and train of four per protocol. Sedated on precedex, propofol, fentanyl. Vented. Received patient on 80% Fio2. Increased by early afternoon to 100%. Patient proned.  When turned supine

## 2020-04-04 NOTE — PROGRESS NOTES
Assumed care at [address] [facility]. Pt is vented and sedated. Paralyzed on Nimbex. Fentanyl/ Propofol/ Precedex/ Levo. Versed given x2 this shift. Fentanyl also bolus. BIS monitoring running upper 30s'. Goal is  40-60. TOF. Lungs diminished. Left [place].  Levo titrated f

## 2020-04-04 NOTE — PROGRESS NOTES
Recent ABG and patient vent setting discussed with on-call [facility], Dr Miri Pedraza. Will increase RR to 35 and monitor peak/plateau pressure response. May adjust TV up to 450 if tolerating increased RR. IVF to change to Bicarb gtt.   ABG 2hr post initiation of Bic

## 2020-04-04 NOTE — PLAN OF CARE
Problem: Safety Risk - Non-Violent Restraints  Goal: Patient will remain free from self-harm  Description  INTERVENTIONS:  - Apply the least restrictive restraint to prevent harm  - Notify patient and family of reasons restraints applied  - Assess for an anxiety  - Monitor for signs/symptoms of CO2 retention  Outcome: Progressing     Problem: PAIN - ADULT  Goal: Verbalizes/displays adequate comfort level or patient's stated pain goal  Description  INTERVENTIONS:  - Encourage pt to monitor pain and request patient/family/discharge partner  - Complete POLST form as appropriate  - Assess patient's ability to be responsible for managing their own health  - Refer to Case Management Department for coordinating discharge planning if the patient needs post-hospital 0

## 2020-04-04 NOTE — ANESTHESIA PROCEDURE NOTES
Central Line  Performed by: Patti Arteaga MD  Authorized by: Patti Arteaga MD     General Information and Staff    Procedure Start:   Procedure End: 4/4/2020 4:20 PM  Anesthesiologist:  Patti Arteaga MD  Performed by:   Anesthesiologist

## 2020-04-04 NOTE — RESPIRATORY THERAPY NOTE
Received patient intubated on settings of VC+/28/400/60%/+15. Per MD Ly rate was changed to 32 and PEEP to 14. After ABG rate was again changed to 35. Patient with some drifting sats needing 70-80% FiO2 overnight.  Once patient was placed prone in bed

## 2020-04-04 NOTE — PROGRESS NOTES
[facility]  Progress Note     Lendon Messing Patient Status:  Inpatient    1975 MRN [id number]   [facility] 4SW-A Attending Brennan Deras MD   Hosp Day # 2 PCP Guillermo Lange MD     Chief Complaint: resp failure    S: Patient Kenzie Bloodgood 3,087.2* 3,282.5*   *  744* 801* 900* 1,169*   DDIMER 0.88*  --   --   --        Imaging: Imaging data reviewed in Epic.     Medications:   • Albuterol Sulfate HFA  8 puff Inhalation QID   • Ipratropium Bromide HFA  8 puff Inhalation QID   • furosemi

## 2020-04-05 ENCOUNTER — APPOINTMENT (OUTPATIENT)
Dept: GENERAL RADIOLOGY | Facility: HOSPITAL | Age: 45
DRG: 003 | End: 2020-04-05
Attending: THORACIC SURGERY (CARDIOTHORACIC VASCULAR SURGERY)
Payer: COMMERCIAL

## 2020-04-05 PROCEDURE — 99291 CRITICAL CARE FIRST HOUR: CPT | Performed by: INTERNAL MEDICINE

## 2020-04-05 PROCEDURE — 99233 SBSQ HOSP IP/OBS HIGH 50: CPT | Performed by: HOSPITALIST

## 2020-04-05 PROCEDURE — 71045 X-RAY EXAM CHEST 1 VIEW: CPT | Performed by: THORACIC SURGERY (CARDIOTHORACIC VASCULAR SURGERY)

## 2020-04-05 PROCEDURE — 30233N1 TRANSFUSION OF NONAUTOLOGOUS RED BLOOD CELLS INTO PERIPHERAL VEIN, PERCUTANEOUS APPROACH: ICD-10-PCS | Performed by: HOSPITALIST

## 2020-04-05 RX ORDER — DIPHENHYDRAMINE HYDROCHLORIDE 50 MG/ML
INJECTION INTRAMUSCULAR; INTRAVENOUS
Status: COMPLETED
Start: 2020-04-05 | End: 2020-04-05

## 2020-04-05 RX ORDER — FUROSEMIDE 10 MG/ML
40 INJECTION INTRAMUSCULAR; INTRAVENOUS
Status: DISCONTINUED | OUTPATIENT
Start: 2020-04-05 | End: 2020-04-06

## 2020-04-05 RX ORDER — MIDAZOLAM HYDROCHLORIDE 1 MG/ML
2 INJECTION INTRAMUSCULAR; INTRAVENOUS EVERY 5 MIN PRN
Status: DISCONTINUED | OUTPATIENT
Start: 2020-04-05 | End: 2020-04-18

## 2020-04-05 RX ORDER — DEXTROSE MONOHYDRATE 50 MG/ML
INJECTION, SOLUTION INTRAVENOUS CONTINUOUS
Status: ACTIVE | OUTPATIENT
Start: 2020-04-05 | End: 2020-04-05

## 2020-04-05 RX ORDER — DIPHENHYDRAMINE HYDROCHLORIDE 50 MG/ML
50 INJECTION INTRAMUSCULAR; INTRAVENOUS ONCE
Status: COMPLETED | OUTPATIENT
Start: 2020-04-05 | End: 2020-04-05

## 2020-04-05 RX ORDER — FUROSEMIDE 10 MG/ML
INJECTION INTRAMUSCULAR; INTRAVENOUS
Status: DISCONTINUED
Start: 2020-04-05 | End: 2020-04-06

## 2020-04-05 RX ORDER — SODIUM CHLORIDE 9 MG/ML
INJECTION, SOLUTION INTRAVENOUS ONCE
Status: COMPLETED | OUTPATIENT
Start: 2020-04-05 | End: 2020-04-05

## 2020-04-05 RX ORDER — ALBUMIN, HUMAN INJ 5% 5 %
SOLUTION INTRAVENOUS
Status: DISPENSED
Start: 2020-04-05 | End: 2020-04-05

## 2020-04-05 RX ORDER — ALBUMIN, HUMAN INJ 5% 5 %
SOLUTION INTRAVENOUS
Status: COMPLETED
Start: 2020-04-05 | End: 2020-04-05

## 2020-04-05 RX ORDER — MIDAZOLAM HYDROCHLORIDE 1 MG/ML
INJECTION INTRAMUSCULAR; INTRAVENOUS
Status: DISCONTINUED
Start: 2020-04-05 | End: 2020-04-06

## 2020-04-05 RX ORDER — ALBUMIN, HUMAN INJ 5% 5 %
500 SOLUTION INTRAVENOUS ONCE AS NEEDED
Status: COMPLETED | OUTPATIENT
Start: 2020-04-05 | End: 2020-04-05

## 2020-04-05 NOTE — PROGRESS NOTES
04/04/20 2330   Vitals   Pulse 73   Resp 21   BP (!) 65/28   MAP (mmHg) 37   Art Line   AO 58/29   Arterial Line MAP (mmHg) 36 mmHg   ECMO Pump   Pump Flow (L/min) 3.2 LPM   Pump Speed (Rotations/min) 3390 RPM   ECMO Sweep Gas    Flow (L/min) 3 L

## 2020-04-05 NOTE — PLAN OF CARE
Pt received intubated/ vent settings 32/300/40/14. Nitric 40ppm.  Pharmaceutically paralyzed on nimbex. TOF 0/4 twitches on the wrist. Site changed to forehead noted 4/4 twitches at 5 MA. Nimbex titrated per protocol. Repeat TOF 4/4 twitches;  No movement

## 2020-04-05 NOTE — PROGRESS NOTES
[facility] Pulmonary/Critical Care Progress Note     SUBJECTIVE/Interval history: All events, procedures, notes reviewed. Initiated on ECMO yesterday afternoon due to worsening hypoxic respiratory failure.  Since that ti masses. No guarding. No rebound.   Extremities:no edema    Lab Data Review:   Recent Labs   Lab 04/04/20  0414 04/04/20  1254  04/04/20  2259 04/05/20  0056 04/05/20  0333   * 184*  --   --   --  126*   BUN 7 6*  --   --   --  4*   CREATSERUM 0.83 • Albumin Human       • Albumin Human       • Albuterol Sulfate HFA  8 puff Inhalation QID   • Ipratropium Bromide HFA  8 puff Inhalation QID   • furosemide  20 mg Intravenous BID (Diuretic)   • pantoprazole (PROTONIX) IV push  40 mg Intravenous Q1 ECMO 4/4  · Shock - likely related to intravascular hypovolemia, medications/sedation induced  · Prolonged QTc  · Acute anemia - worsening given ongoing anticoagulation and ECMO lysis  · Hyponatremia  · Elevated inflammatory markers - due to above  · trans

## 2020-04-05 NOTE — PROGRESS NOTES
[facility]  Progress Note     [facility] Patient Status:  Inpatient    1975 MRN [id number]   [facility] 4SW-A Attending Balbina Mcintosh, [address] [facility] [address] Day # 3 PCP Ivone Guerrero MD     Chief Complaint: resp failure    S: Patient Shen Chan Recent Labs   Lab 04/02/20  1000 04/03/20  0447   PTP 13.6 13.8   INR 1.01 1.03            COVID-19 Lab Results    COVID-19  Lab Results   Component Value Date    COVID19 Detected (AA) 03/31/2020       Pro-Calcitonin  Recent Labs   Lab 04/02/20  1000 discharge: TBD  Discharge is dependent on: clinical stability  At this point Mr. Serena Reeves is expected to be discharge to: home    Plan of care discussed with patient or family at bedside.     Darian Ardon MD

## 2020-04-05 NOTE — PROGRESS NOTES
Assumed care for this patient at 0730. Patient sedated and paralyzed with vent, NO and ecmo. Dr. Hardy Haw to beside to place suture at right IJ due to bleeding overnight. S/p suture no further oozing from 2 right IJ site. HGB stable throughout the day.  Ecmo michael

## 2020-04-05 NOTE — PROGRESS NOTES
04/05/20 0320   Vitals   Temp 99.3 °F (37.4 °C)   Pulse 69   Resp 24   Art Line   AO 66/28   Arterial Line MAP (mmHg) 37 mmHg     Blood stopped d/t possible transfusion reaction. Severe hypotension. Transfusion reaction protocol initiated.  Dr. Chu Bess and

## 2020-04-05 NOTE — PROGRESS NOTES
Arterial Line  Performed by: Symone Escudero  Authorized by: Symone Escudero     General Information and Staff     Procedure Start: 2100  Patient Location:  ICU  Indication: continuous blood pressure monitoring and blood sampling needed    Site Identificatio

## 2020-04-05 NOTE — PROGRESS NOTES
04/04/20 2020 04/04/20 2100   Train of Four   Twitches (Train of Four) 4  (forehead) 3  (forehead)   Number of milliamps (Train of Four) 5 mA 5 mA     Spoke to Irina/ AYE [place] regarding TOF and nimbex rate. Patient not breathing over vent.  Continue nim

## 2020-04-05 NOTE — PLAN OF CARE
Assumed care at shift change. PT medically paralyzed. ECMO. Using train of four per protocol. Nimbex d'c early afternoon. Sedated on fentanyl, precedex, and propofol. BIS 30's-40's. Pupils equal and reactive. Intubated.  ev7s and acts drawn q1-2 hours and

## 2020-04-06 ENCOUNTER — APPOINTMENT (OUTPATIENT)
Dept: GENERAL RADIOLOGY | Facility: HOSPITAL | Age: 45
DRG: 003 | End: 2020-04-06
Attending: THORACIC SURGERY (CARDIOTHORACIC VASCULAR SURGERY)
Payer: COMMERCIAL

## 2020-04-06 PROCEDURE — 99232 SBSQ HOSP IP/OBS MODERATE 35: CPT | Performed by: HOSPITALIST

## 2020-04-06 PROCEDURE — 71045 X-RAY EXAM CHEST 1 VIEW: CPT | Performed by: THORACIC SURGERY (CARDIOTHORACIC VASCULAR SURGERY)

## 2020-04-06 PROCEDURE — 99291 CRITICAL CARE FIRST HOUR: CPT | Performed by: INTERNAL MEDICINE

## 2020-04-06 RX ORDER — FUROSEMIDE 10 MG/ML
40 INJECTION INTRAMUSCULAR; INTRAVENOUS
Status: DISCONTINUED | OUTPATIENT
Start: 2020-04-06 | End: 2020-04-07

## 2020-04-06 RX ORDER — POTASSIUM CHLORIDE 29.8 MG/ML
40 INJECTION INTRAVENOUS ONCE
Status: COMPLETED | OUTPATIENT
Start: 2020-04-06 | End: 2020-04-06

## 2020-04-06 RX ORDER — POTASSIUM CHLORIDE 14.9 MG/ML
20 INJECTION INTRAVENOUS ONCE
Status: COMPLETED | OUTPATIENT
Start: 2020-04-06 | End: 2020-04-06

## 2020-04-06 NOTE — OPERATIVE REPORT
[facility]    PATIENT'S NAME: Alberto Forde   ATTENDING PHYSICIAN: Amrit Gerber M.D. OPERATING PHYSICIAN: Hans Sapp M.D.    PATIENT ACCOUNT#:   [de-identified]    LOCATION:  [address] [place] [address]  MEDICAL RECORD #:   [id number]       DATE OF BIRTH:  [birth year] well and was without apparent acute complications. The procedure resulted in the immediate normalization of the patient's acid-base status, carbon dioxide, and oxygen saturation.     Patient was taken in stable but critical condition from the hybrid oper

## 2020-04-06 NOTE — PROGRESS NOTES
[facility]                INFECTIOUS DISEASE PROGRESS NOTE    Connie Gutierrez Patient Status:  Inpatient    1975 MRN [id number]   [facility] 4SW-A Attending Pernell Castleman, MD   Hosp Day # 4 PCP Daniella Mosley MD     Antibiotics: zo CREATSERUM 0.87 0.83   < > 0.91  --  0.99 0.99   GFRAA 121 124   < > 118  --  107 107   GFRNAA 105 107   < > 102  --  92 92   CA 7.7* 7.0*   < > 7.2*  --  6.7* 7.2*   ALB 2.7* 2.6*  --  2.7*  --   --   --     147*   < > 149*  --  139 141   K 4.4 4.

## 2020-04-06 NOTE — PLAN OF CARE
Assume care at 0730. Pt ventilated with ECMO and N.O.  Pt sedated on fentanyl, versed, precedex and propofol gtts. BIS 20-30's. Nitric Oxide remains at 20ppm.  Monitoring ABGs and ACT q2h. ACT remains stable in the 160's.   Continue Heparin gtt at prese

## 2020-04-06 NOTE — PROGRESS NOTES
[facility] Pulmonary/Critical Care Progress Note     SUBJECTIVE/Interval history: All events, procedures, notes reviewed. Remains intubated/sedated on ECMO.  Paralytic weaned off yesterday and some ventilator dyssynchron Plateau pressure of 25  Chest wall:No tenderness or deformity. Heart: RRR no murmur  Abdomen:Soft, non-tender. No masses. No guarding. No rebound.   Extremities:no edema    Lab Data Review:   Recent Labs   Lab 04/05/20  0333 04/05/20  0848 04/05/20  160 EPIUR None Seen       Interval Radiology:         • furosemide  40 mg Intravenous BID (Diuretic)   • Albuterol Sulfate HFA  8 puff Inhalation QID   • Ipratropium Bromide HFA  8 puff Inhalation QID   • pantoprazole (PROTONIX) IV push  40 mg Intravenous Q1 pneumonia, s/p ECMO 4/4  · Shock - likely related to intravascular hypovolemia, medications/sedation induced  · Prolonged QTc likely medication induced  · Acute anemia - worsening given ongoing anticoagulation and ECMO lysis  · Hyponatremia  · Elevated inf

## 2020-04-06 NOTE — OPERATIVE REPORT
[facility]    PATIENT'S NAME: Leonce Libman   ATTENDING PHYSICIAN: Maximilian Ram M.D.    OPERATING PHYSICIAN: Jagdeep Keenan MD   PATIENT ACCOUNT#:   [id number]    LOCATION:  [address] [place] [address]  MEDICAL RECORD #:   [id number]       YOB: 1975  A pulmonary artery tending toward the right pulmonary artery. Flow was established. No complications. The cannula was sutured in place and then secured. Blood gas checked on the ECMO circuit at 3.5 L of flow showed pH 7.42, pCO2 of 36, PO2 of 228.   The p

## 2020-04-06 NOTE — PROGRESS NOTES
[facility]  Progress Note     Ernesto Henry Patient Status:  Inpatient    1975 MRN [id number]   [facility] 4SW-A Attending Kee Ruth, [address] [facility] Se Day # 4 PCP Thania Womack MD     Chief Complaint: resp failure    S: Patient Yasir Harsh ALT 37 40  --  32  --   --   --    BILT 0.6 0.5  --  1.2  --   --   --    TP 6.8 6.1*  --  5.2*  --   --   --     < > = values in this interval not displayed.        Recent Labs   Lab 04/02/20  1000 04/03/20  0447 04/06/20  0424   PTP 13.6 13.8 13.4   INR QTC  1. Monitor  2. Minimize any potentially offending drugs  6. Acidosis  7. Hypernatremia  1. Resolved, monitor  8. HTN  9. DL  10. 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 Heparin drip    Plan of care: as above. On ECMO.  Fluid OL/pulm edema

## 2020-04-06 NOTE — DIETARY NOTE
[facility]    NUTRITION ASSESSMENT    Pt does not meet malnutrition criteria.     NUTRITION DIAGNOSIS/PROBLEM:  Inadequate oral intake related to physiological causes as evidenced by vent status, intubated, sedated, prone. -ongoing    NUTRITION INTERVE lean body mass  5. Tolerance of enteral nutrition without signs/symptoms of intolerance (abdominal discomfort/distention)  6.  Alternative means of nutrition at goal to meet 100% patient nutrition prescription    MEDICATIONS:  Azithromycin, hydroxychloroqui

## 2020-04-06 NOTE — PROGRESS NOTES
Pt noted to be dyssynchronous with the vent and breathing over his rate. Peak airway pressure 36-39 and Plateau 56-86. Discussed with Dr. Alida Gonzalez and orders placed to increase sedation with Fentanyl gtt and pushes.  After Fentanyl did not improve above pa

## 2020-04-06 NOTE — CONSULTS
[facility]    Report of Consultation    Jett Murguia Patient Status:  Inpatient    1975 MRN [id number]   [facility] 4SW-A Attending Araceli Rosales MD   [facility] Day # 4 PCP Sina Valenzuela MD     Date of Admission:  2020  Date of Co that he has quit smoking. He has a 8.50 pack-year smoking history. He has never used smokeless tobacco. He reports current alcohol use. He reports that he does not use drugs.     Allergies:  No Known Allergies    Medications:    Current Facility-Administere Units/min, Intravenous, Continuous  •  fentanyl (SUBLIMAZE) 1000 mcg/100 ml NS premix infusion,  mcg/hr, Intravenous, Continuous PRN  •  Piperacillin Sod-Tazobactam So (ZOSYN) 3.375 g in dextrose 5 % 100 mL ADD-vantage, 3.375 g, Intravenous, Q8H  • Status:  N/A    Laboratory Data:    Lab Results   Component Value Date    WBC 17.6 04/06/2020    HGB 10.0 04/06/2020    HCT 26.9 04/06/2020    .0 04/06/2020    CREATSERUM 0.99 04/06/2020    BUN 2 04/06/2020     04/06/2020    K 4.2 04/06/2020 Further, there is no known cutoff to escalate from DVT prophylaxis dose anticoagulation to full dose anticoagulation. At this point, the patient is tolerating his current anticoagulation for ECMO.   Recommend continuing at the level necessary to maintain p

## 2020-04-06 NOTE — PROGRESS NOTES
Seen and examined. Discussed in detail with Dr. Claudeen Marker, Dr. Beata Paniagua, perfusionist and nursing staff.     Currently on 60% FiO2 - plans to decrease to 50%    Tele sinus - no further ventricular arrhythmia    Afebrile  78 regular  124/68    Good hemodynamics on

## 2020-04-06 NOTE — PROGRESS NOTES
APN at bedside last night update on pt resp. Rate 26-31  With a set rate 24. Noted abd rising with each breath. Ordered to increase Fentanyl to 250mcg/hour. Unable to do on current 10mg/ml concentration max allowed on pump is 200mcg/hr.  If concentration wa

## 2020-04-07 ENCOUNTER — APPOINTMENT (OUTPATIENT)
Dept: GENERAL RADIOLOGY | Facility: HOSPITAL | Age: 45
DRG: 003 | End: 2020-04-07
Attending: THORACIC SURGERY (CARDIOTHORACIC VASCULAR SURGERY)
Payer: COMMERCIAL

## 2020-04-07 ENCOUNTER — APPOINTMENT (OUTPATIENT)
Dept: GENERAL RADIOLOGY | Facility: HOSPITAL | Age: 45
DRG: 003 | End: 2020-04-07
Attending: INTERNAL MEDICINE
Payer: COMMERCIAL

## 2020-04-07 PROCEDURE — 71045 X-RAY EXAM CHEST 1 VIEW: CPT | Performed by: THORACIC SURGERY (CARDIOTHORACIC VASCULAR SURGERY)

## 2020-04-07 PROCEDURE — 99291 CRITICAL CARE FIRST HOUR: CPT | Performed by: INTERNAL MEDICINE

## 2020-04-07 PROCEDURE — 71045 X-RAY EXAM CHEST 1 VIEW: CPT | Performed by: INTERNAL MEDICINE

## 2020-04-07 PROCEDURE — 99232 SBSQ HOSP IP/OBS MODERATE 35: CPT | Performed by: HOSPITALIST

## 2020-04-07 RX ORDER — FUROSEMIDE 10 MG/ML
40 INJECTION INTRAMUSCULAR; INTRAVENOUS 3 TIMES DAILY
Status: DISCONTINUED | OUTPATIENT
Start: 2020-04-07 | End: 2020-04-13

## 2020-04-07 RX ORDER — ORPHENADRINE CITRATE 30 MG/ML
30 INJECTION INTRAMUSCULAR; INTRAVENOUS EVERY 6 HOURS SCHEDULED
Status: DISCONTINUED | OUTPATIENT
Start: 2020-04-07 | End: 2020-04-11

## 2020-04-07 RX ORDER — POTASSIUM CHLORIDE 29.8 MG/ML
40 INJECTION INTRAVENOUS ONCE
Status: COMPLETED | OUTPATIENT
Start: 2020-04-07 | End: 2020-04-07

## 2020-04-07 NOTE — CM/SW NOTE
Care Progression Note:  Active Acute Medical Issue: acute resp failure d/t COVID 19,  Intubated, ventilated and on ECMO support . Completed placquinel, zithromax    Other Contributing Medical Factors/Dx. : shock, microthrombi    Length of stay: 5  Avoidabl

## 2020-04-07 NOTE — PROGRESS NOTES
[facility]  Progress Note     Carlos Au Patient Status:  Inpatient    1975 MRN [id number]   [facility] 4SW-A Attending Dina Watson, [address] [facility] [address] Day # 5 PCP Nona Jacob MD     Chief Complaint: resp failure    S: Patient Zulma Turner 5.2*  --   --   --  5.5*    < > = values in this interval not displayed.        Recent Labs   Lab 04/02/20  1000 04/03/20  0447 04/06/20  0424   PTP 13.6 13.8 13.4   INR 1.01 1.03 0.99            COVID-19 Lab Results    COVID-19  Lab Results   Component Yoli Thrombotic Microangiopathy  1. Heparin drip    Plan of care: as above. On ECMO.  Diurese as tolerated    Quality:  · DVT Prophylaxis: heparin drip  · CODE status: full      Estimated date of discharge: TBD  Discharge is dependent on: clinical stability  At

## 2020-04-07 NOTE — PROGRESS NOTES
Reviewed this am with Dr. Abe Menjivar and bedside nursing staff.     Intubated and sedated    Currently mild fever - 100.2   95/46 on 8 mcgs levophed  86 regular - no further NSVT    HEENT: less edematous  Neck:ecmo catheter secure - no bleeding  Lungs coarse BS  H

## 2020-04-07 NOTE — PROGRESS NOTES
[facility]  Progress Note    Tomasa Vázquez Patient Status:  Inpatient    1975 MRN [id number]   [facility] 4SW-A Attending Paulo Contreras MD   Hosp Day # 5 PCP Josué Louie MD     Subjective:  Tomasa Vázquez is a(n) 41year old male.   Re < > 0.91  --  0.99 0.99 1.06   GFRAA 124   < > 118  --  107 107 98   GFRNAA 107   < > 102  --  92 92 85   CA 7.0*   < > 7.2*  --  6.7* 7.2* 6.6*   ALB 2.6*  --  2.7*  --   --   --  2.2*   *   < > 149*  --  139 141 140   K 4.4   < > 3.7   < > 4.3 4.2 hemodynamics, titrate vasopressors for goal MAP >65  · Completed COVID treatment regimen: zithromycin x5 days(started zpak 3/31), hydroxychloroquine x 4 days (stopped 4/5 due to worsening prolonged QT); given tocilizumab x1 dose, 4/2  · ECMO management as

## 2020-04-07 NOTE — PROGRESS NOTES
Assumed care of pt at 0730. V-V ECMO to Right IJ site is C/D/I no oozing noted. Dressing changed by Dr Eryn Frye using sterile technique. SBP ranges from  without any stimulation/reason. Levo remains at 8 mcg/min.    Multiple attempts made to stop propofol

## 2020-04-07 NOTE — PROGRESS NOTES
1- Dr. Darlene Connell paged with ACT and ABG results. Orders received for heparin to remain at 600u/hr and for ecmo flows to remain to the same.  ACT/ABG at 0300 am.

## 2020-04-07 NOTE — PROGRESS NOTES
Hypertriglyceridemia (1176) noted on AM labs--rapid wean off propofol and increase versed gtt if needed for further sedation. Discussed with [facility], Dr Ricky Florez whom agreed with plan.   May need to consider Ketamine gtt if Versed gtt not sufficient    Martinsully Pires

## 2020-04-07 NOTE — PLAN OF CARE
Presently sedated to RASS -5. Note with oral care BP drops via BP cuff and A-line to the 60's for <5 minutes before recovering on own (at other times BP is in upper 140's).   BIS 20-30 with EMG <10 - clarified with APN to attempt to achieve RASS goal over

## 2020-04-07 NOTE — RESPIRATORY THERAPY NOTE
CALLED TO PATIENT ROOM FOR HIGH PEAK AIRWAY PRESSURES AND UNABLE TO PASS SUCTION CATHETER. RT AT BEDSIDE. DID PASS CATHETER , LAVAGE AND SUCTION LARGE AMOUNT OF DARK BROWN TO RED THICK SECRETIONS. POSSIBLE PLUG.   AIRWAY PRESSURES IN THE UPPER 30'S NOW,

## 2020-04-07 NOTE — RESPIRATORY THERAPY NOTE
Received pt vented, VC 24/300/60%/+10, tolerating well. N.O. at 20ppm. Pt on ECMO. Albuterol and Atrovent MDI's QID. No changes made. Will continue to monitor closely.

## 2020-04-08 ENCOUNTER — APPOINTMENT (OUTPATIENT)
Dept: GENERAL RADIOLOGY | Facility: HOSPITAL | Age: 45
DRG: 003 | End: 2020-04-08
Attending: THORACIC SURGERY (CARDIOTHORACIC VASCULAR SURGERY)
Payer: COMMERCIAL

## 2020-04-08 ENCOUNTER — APPOINTMENT (OUTPATIENT)
Dept: CV DIAGNOSTICS | Facility: HOSPITAL | Age: 45
DRG: 003 | End: 2020-04-08
Attending: NURSE PRACTITIONER
Payer: COMMERCIAL

## 2020-04-08 PROCEDURE — 93308 TTE F-UP OR LMTD: CPT | Performed by: NURSE PRACTITIONER

## 2020-04-08 PROCEDURE — 99291 CRITICAL CARE FIRST HOUR: CPT | Performed by: INTERNAL MEDICINE

## 2020-04-08 PROCEDURE — 71045 X-RAY EXAM CHEST 1 VIEW: CPT | Performed by: THORACIC SURGERY (CARDIOTHORACIC VASCULAR SURGERY)

## 2020-04-08 PROCEDURE — 99232 SBSQ HOSP IP/OBS MODERATE 35: CPT | Performed by: INTERNAL MEDICINE

## 2020-04-08 NOTE — DIETARY NOTE
Clinical Nutrition  Plan to initiate TPN given residuals of 460 without TFs. TPN today providing 565 NPC (335 dextrose calories, 230 lipid calories), 30% lipids, 50 grams protein, 1600 ml fluid. This is meeting 40% pt calorie and 50% protein needs.  Electro

## 2020-04-08 NOTE — PROGRESS NOTES
[facility]  Progress Note    Gilberto Canas Patient Status:  Inpatient    1975 MRN [id number]   [facility] 4SW-A Attending Jaja Woodall MD   Hosp Day # 6 PCP Kianna Duenas MD     Subjective:  Gilberto Canas is a(n) 41year old male.   Re > 124* 105* 103*   BUN 4*   < > 2* 5* 12   CREATSERUM 0.91   < > 0.99 1.06 1.11   GFRAA 118   < > 107 98 93   GFRNAA 102   < > 92 85 80   CA 7.2*   < > 7.2* 6.6* 7.5*   ALB 2.7*  --   --  2.2* 2.3*   *   < > 141 140 142   K 3.7   < > 4.2 4.7 4.3   CL HLD  · GERD    Plan:  · Continue close monitoring of hemodynamics, titrate vasopressors for goal MAP >65  · Completed COVID treatment regimen: zithromycin x5 days(started zpak 3/31), hydroxychloroquine x 4 days (stopped 4/5 due to worsening prolonged QT);

## 2020-04-08 NOTE — PLAN OF CARE
Presently sedated to BIS 30s to 40's EMG <30. Thus far noting fewer episodes of transient unexplained hypotension. Pupils - minimal reaction noted but reaction that occurs is 2B.   #7.5/25 ACVC 24 300 0.45 +10, 20 ppm nitric, lungs clear/diminished anteri

## 2020-04-08 NOTE — PROGRESS NOTES
Seen and examined early this am. Reviewed with nursing staff at bedside and discussed with Dr. Ceci Kinney. Intubated. Sedated    Off nitric oxide - paralytics restarted yesterday secondary to agitation and dyssynchrony on the vent.     Low dose levophed (8 mcgs

## 2020-04-08 NOTE — PLAN OF CARE
Assumed care of pt @ 0730. Pt intubated, sedated, paralyzed. ECHO completed, unremarkable. ECMO site clean dry intact, dressing changed by Dr. Rashi Rebolledo today. TPN being started tonight. OG started on low intermittent suction. Weaned PEEP from 10 to 5 today.  BP

## 2020-04-08 NOTE — PROGRESS NOTES
[facility]  Progress Note     Connie Gutierrez Patient Status:  Inpatient    1975 MRN [id number]   [facility] 4SW-A Attending Pernell Castleman, [address] [facility] Se Day # 6 PCP Daniella Mosley MD     Chief Complaint: resp failure    S: Patient Lasha Wen 23.0 23.0 26.0   ALKPHO 37*  --   --  85 105   *  --   --  390* 266*   ALT 32  --   --  138* 113*   BILT 1.2  --   --  1.6 1.2   TP 5.2*  --   --  5.5* 5.6*    < > = values in this interval not displayed.        Recent Labs   Lab 04/02/20  1000 04/03 QTC)  3. ceft -> zosyn on 4/2  4. S/p Actemra x1 4/2  5. Trending inflamm markers  6. Prolonged QTC  1. Monitor  2. Minimize any potentially offending drugs  7. Hypernatremia  1. Resolved, monitor  8. HTN  9. DL  10. GERD  11.  Presumptive Thrombotic Microa

## 2020-04-08 NOTE — PROGRESS NOTES
[facility]                INFECTIOUS DISEASE PROGRESS NOTE    Jett Murguia Patient Status:  Inpatient    1975 MRN [id number]   [place] 4SW-A Attending Araceli Rosales MD   Hosp Day # 6 PCP Sina Valenzuela MD     Antibiotics: zo 4.7 4.3      < > 110 110 111   CO2 25.0   < > 23.0 23.0 26.0   ALKPHO 37*  --   --  85 105   *  --   --  390* 266*   ALT 32  --   --  138* 113*   BILT 1.2  --   --  1.6 1.2   TP 5.2*  --   --  5.5* 5.6*    < > = values in this interval not dis

## 2020-04-08 NOTE — PLAN OF CARE
EKG this AM looks markedly different when compared to yesterdays. Areas of ST elevation in leads V4 V5.  Dr Adriane Odom with [facility] Card[facility] called, STAT limited echo ordered for this AM.

## 2020-04-09 ENCOUNTER — APPOINTMENT (OUTPATIENT)
Dept: GENERAL RADIOLOGY | Facility: HOSPITAL | Age: 45
DRG: 003 | End: 2020-04-09
Attending: INTERNAL MEDICINE
Payer: COMMERCIAL

## 2020-04-09 PROCEDURE — 99291 CRITICAL CARE FIRST HOUR: CPT | Performed by: INTERNAL MEDICINE

## 2020-04-09 PROCEDURE — 71045 X-RAY EXAM CHEST 1 VIEW: CPT | Performed by: INTERNAL MEDICINE

## 2020-04-09 PROCEDURE — 99232 SBSQ HOSP IP/OBS MODERATE 35: CPT | Performed by: INTERNAL MEDICINE

## 2020-04-09 RX ORDER — POTASSIUM CHLORIDE 14.9 MG/ML
20 INJECTION INTRAVENOUS ONCE
Status: COMPLETED | OUTPATIENT
Start: 2020-04-09 | End: 2020-04-09

## 2020-04-09 NOTE — BH PROGRESS NOTE
Assumed care of pt at 0730. He is intubated/sedated with ketamine, versed, precedex; paralyzed with nimbex and dilaudid for pain. precedex weaned off per Dr Bob Stroud. Attempted to wean down nimbex as TOF was 0/4 at MA baseline of 5.  However after 10-15 minute

## 2020-04-09 NOTE — PLAN OF CARE
Received at [address] [place] [address], sedated and pharmaceutically paralyzed on vent  on versed, precedex, dilaudid, ketamine and nimbex drips. Tolerating vent settings. No asynchrony with vent on current nimbex dose.    Ecmo cannulation site to right neck cdi- no oozing noted perfusion - ex.  Angina  - Evaluate fluid balance, assess for edema, trend weights  Outcome: Not Progressing  Goal: Absence of cardiac arrhythmias or at baseline  Description  INTERVENTIONS:  - Continuous cardiac monitoring, monitor vital signs, obtain 12 l

## 2020-04-09 NOTE — PROGRESS NOTES
[facility]  Progress Note     Yesika Goss Patient Status:  Inpatient    1975 MRN [id number]   [facility] 4SW-A Attending Nestor Rodriguez, [address] [facility] [address] Day # 7 PCP Anna Barillas MD     Chief Complaint: resp failure    S: Patient Mary Pi (AA) 03/31/2020       Pro-Calcitonin  Recent Labs   Lab 04/08/20  0349   PCT 0.95*       Cardiac  Recent Labs   Lab 04/04/20  0414 04/06/20  0424 04/08/20  0349   TROP <0.045  --  1.080*   PBNP 244* 221*  --        Creatinine Kinase  Recent Labs   Lab 04/0 Diuresing as tolerated  Some improvement but prognosis is guarded in this critically ill patient    Quality:  · DVT Prophylaxis: heparin drip  · CODE status: full  · Best: Yes  · Central Line: Yes    Estimated date of discharge: TBD  Discharge is dependen

## 2020-04-09 NOTE — DIETARY NOTE
Clinical Nutrition  Tolerating initial bag of TPN. Remains on ECMO, intubated w/ 40% FiO2, PEEP 5, weaning on levo, on paralytics, sedated on versed, precedex, ketamine. OG output 510 cc/24hr.   TPN today providing 730 NPC (500 dextrose calories, 230 lipid

## 2020-04-09 NOTE — PROGRESS NOTES
Reviewed overnight course with nursing staff. Discussed with Dr. Spencer Harden and perfusionist.    No major issues overnight.  Still with some abrupt fluctuations in blood pressures - very sensitive to even the smallest movement    Levophed reduced to 5 mcgs    Ludmila

## 2020-04-09 NOTE — PROGRESS NOTES
[facility]  Progress Note    Nain Cox Patient Status:  Inpatient    1975 MRN [id number]   [facility] 4SW-A Attending Mariano Cardozo, DO   Hosp Day # 7 PCP Kelley Chopra MD     Subjective:  Nain Cox is a(n) 41year old male temp 43.9*  --  54.9*       Cultures: Blood cultures thus far remain negative  Sputum 4/7 remains with no growth    Radiology:      Chest x-rays reviewed appears with increasing alveolar infiltrates especially right upper lobe      Medications reviewed     Asse

## 2020-04-10 ENCOUNTER — APPOINTMENT (OUTPATIENT)
Dept: GENERAL RADIOLOGY | Facility: HOSPITAL | Age: 45
DRG: 003 | End: 2020-04-10
Attending: INTERNAL MEDICINE
Payer: COMMERCIAL

## 2020-04-10 ENCOUNTER — APPOINTMENT (OUTPATIENT)
Dept: ULTRASOUND IMAGING | Facility: HOSPITAL | Age: 45
DRG: 003 | End: 2020-04-10
Attending: INTERNAL MEDICINE
Payer: COMMERCIAL

## 2020-04-10 PROCEDURE — 99291 CRITICAL CARE FIRST HOUR: CPT | Performed by: INTERNAL MEDICINE

## 2020-04-10 PROCEDURE — 71045 X-RAY EXAM CHEST 1 VIEW: CPT | Performed by: INTERNAL MEDICINE

## 2020-04-10 PROCEDURE — 99232 SBSQ HOSP IP/OBS MODERATE 35: CPT | Performed by: INTERNAL MEDICINE

## 2020-04-10 PROCEDURE — 76700 US EXAM ABDOM COMPLETE: CPT | Performed by: INTERNAL MEDICINE

## 2020-04-10 RX ORDER — SODIUM CHLORIDE 9 MG/ML
INJECTION, SOLUTION INTRAVENOUS ONCE
Status: DISCONTINUED | OUTPATIENT
Start: 2020-04-10 | End: 2020-04-14

## 2020-04-10 RX ORDER — FUROSEMIDE 10 MG/ML
40 INJECTION INTRAMUSCULAR; INTRAVENOUS ONCE
Status: COMPLETED | OUTPATIENT
Start: 2020-04-10 | End: 2020-04-10

## 2020-04-10 RX ORDER — VANCOMYCIN/0.9 % SOD CHLORIDE 1.75 G/5
25 PLASTIC BAG, INJECTION (ML) INTRAVENOUS ONCE
Status: COMPLETED | OUTPATIENT
Start: 2020-04-10 | End: 2020-04-10

## 2020-04-10 RX ORDER — DEXTROSE MONOHYDRATE 25 G/50ML
50 INJECTION, SOLUTION INTRAVENOUS
Status: DISCONTINUED | OUTPATIENT
Start: 2020-04-10 | End: 2020-05-05

## 2020-04-10 RX ORDER — METOCLOPRAMIDE HYDROCHLORIDE 5 MG/ML
5 INJECTION INTRAMUSCULAR; INTRAVENOUS EVERY 6 HOURS
Status: DISCONTINUED | OUTPATIENT
Start: 2020-04-10 | End: 2020-04-14

## 2020-04-10 NOTE — PROGRESS NOTES
[facility]  Progress Note     Brandy Buenrostro Patient Status:  Inpatient    1975 MRN [id number]   [facility] 4SW-A Attending Jim Gonzalez, [address] [facility] [address] Day # 8 PCP George Tejeda MD     Chief Complaint: resp failure    S: Patient Guido Mueller TROP <0.045  --  1.080*   PBNP 244* 221*  --        Creatinine Kinase  Recent Labs   Lab 04/08/20  0349   CK 2,008*       Inflammatory Markers  Recent Labs   Lab 04/08/20  0349 04/09/20  0350 04/10/20  0426   CRP 7.74* 5.81* 2.62*   ANJEL 4,010.6* 3,363.3* Microangiopathy  1. Heparin drip  13. Anemia/Thrombocytopenia  1. Monitor CBC closely  14. 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 Off propofol  15. Malnutrition  1. TPN started 4/8    Plan of care: as above. On ECMO.  Diuresing as tolerated  Some improvemen

## 2020-04-10 NOTE — PROGRESS NOTES
Reviewed overnight course with nursing staff and reviewed with Dr. Delia Grant.     Tmax 100  86 regular  118/59  - has been able to wean down on vasopressor support - less lability in blood pressures - currently only on 1 mcg levophed    Remains on multiple sedati

## 2020-04-10 NOTE — PLAN OF CARE
Progressing well. PRBC x 2 today for hgb of 7.8. Levophed weaned off. Fio2 weaned to 30% on fent. ECMO remains w/sweep of 4L/100%, Flow 3.4-3.6, speed 3390, CI 1.902. BP less labile with movement-recovers quickly.  ECMO site to RIJ soft, farooq occlusive, DI-

## 2020-04-10 NOTE — DIETARY NOTE
[facility]    NUTRITION ASSESSMENT    Pt does not meet malnutrition criteria.     NUTRITION DIAGNOSIS/PROBLEM:  Inadequate oral intake related to physiological causes as evidenced by vent status, intubated, sedated, prone. -ongoing    NUTRITION INTERVE No  Food Allergies: No  Cultural/Ethnic/Quaker Preferences Addresses: Yes    NUTRITION RELATED PHYSICAL FINDINGS:     1. Body Fat/Muscle Mass: well nourished per visual exam.     2. Fluid Accumulation: none per visual exam.    NUTRITION PRESCRIPTION:  C

## 2020-04-10 NOTE — PROGRESS NOTES
Received patient at 1930  Intubated/sedated with ketamine and versed  Paralyzed with nimbex  Dilaudid for pain  TOF 0/4 at MA baseline of 5  ABG/ACT drawn at 2000- updated Dr. Lilly Spurling and ARMANDO PantojaN  Per Dr. Lilly Spurling, redraw ACT/ABG in am  SBP remains stable, with

## 2020-04-10 NOTE — VASCULAR ACCESS
Left arm PICC line dressing changed. PICC line pulled back and out of arm 4cm. XRAY taken today and read by radiology shows picc in adequate position for continued use. Libia Reyes

## 2020-04-10 NOTE — PROGRESS NOTES
Goal: To achieve optimal ventilation and oxygenation.     INTERVENTIONS:  • Assess for changes in respiratory status  • Assess for changes in mentation and behavior  • Position to facilitate oxygenation and minimize respiratory effort  • Oxygen supplementat Secured Location Right   Secured by Commercial tube robison   Site Condition Dry   Req'd equipment at bedside Bag mask     Recent Labs   Lab 04/10/20  0729   ABGPHT 7.44   YIVHSS1R 46*   UJBGT2V 96   ABGHCO3 29.9*   ABGBE 5.3*   TEMP 99.7   BRIAN Not Appl

## 2020-04-10 NOTE — PROGRESS NOTES
Pharmacy Dosing Service  Initial Pharmacokinetic Consult for Vancomycin Dosing          Yosi Laguerre is a 41year old male who is being treated for continued fevers, possibly secondary bacterial PNA.       Pharmacy has been asked to dose vancomycin by Dr. Bryan Mills

## 2020-04-10 NOTE — PROGRESS NOTES
[facility]  Progress Note    Marsha Cobb Patient Status:  Inpatient    1975 MRN [id number]   [facility] 4SW-A Attending Odalis Marion MD   Hosp Day # 8 PCP Johny Chin MD     Subjective:  Marsha Cobb is a(n) 41year old male.   Re 111 110 108   CO2 26.0 29.0 30.0   ALKPHO 105 89 70   * 183* 162*   * 78* 61   BILT 1.2 1.5 1.5   TP 5.6* 5.8* 5.8*       Recent Labs   Lab 04/08/20  0349 04/09/20  0350 04/10/20  0426   MG 2.3 2.4 2.8*       Lab Results   Component Value Keegan Soumya Carter. Hope to attempt ECMO wean 4/13. · Continue empiric abx, changed to zosyn 4/3, yet continued increase in WBC and low grade fevers. PCT 4/9 0.95. Change to meropenem and vancomycin.   Send cultures from ECMO lines, repeat urine, repeat peripheral

## 2020-04-10 NOTE — PROGRESS NOTES
[facility]                INFECTIOUS DISEASE PROGRESS NOTE    Lyndsey Juares Patient Status:  Inpatient    1975 MRN [id number]   [facility] 4SW-A Attending Carrillo Nelson MD   Hosp Day # 8 PCP Abdirahman Sood MD     Antibiotics#8(m 30.0   ALKPHO 105 89 70   * 183* 162*   * 78* 61   BILT 1.2 1.5 1.5   TP 5.6* 5.8* 5.8*       No results found for: [facility] Encounter on 04/02/20   1.  BLOOD CULTURE     Status: None (Preliminary result)    Collection T

## 2020-04-11 ENCOUNTER — APPOINTMENT (OUTPATIENT)
Dept: GENERAL RADIOLOGY | Facility: HOSPITAL | Age: 45
DRG: 003 | End: 2020-04-11
Attending: INTERNAL MEDICINE
Payer: COMMERCIAL

## 2020-04-11 PROCEDURE — 99232 SBSQ HOSP IP/OBS MODERATE 35: CPT | Performed by: INTERNAL MEDICINE

## 2020-04-11 PROCEDURE — 99291 CRITICAL CARE FIRST HOUR: CPT | Performed by: INTERNAL MEDICINE

## 2020-04-11 PROCEDURE — 71045 X-RAY EXAM CHEST 1 VIEW: CPT | Performed by: INTERNAL MEDICINE

## 2020-04-11 NOTE — PROGRESS NOTES
[facility]                INFECTIOUS DISEASE PROGRESS NOTE    Lindsay Late Patient Status:  Inpatient    1975 MRN [id number]   [facility] 4SW-A Attending Jarad Gaston MD   Hosp Day # 9 PCP Roberto Brown MD     Antibiotics#9(m 04/10/20 10:11 AM   Result Value Ref Range    Blood Culture Result No Growth 1 Day N/A         Radiology    cxr 4/8 reviewed    Problem list reviewed:  Patient Active Problem List:     Hyperlipidemia     Benign essential HTN     Impaired fasting glucose

## 2020-04-11 NOTE — PROGRESS NOTES
[facility]  Progress Note    Lilian Mejia Patient Status:  Inpatient    1975 MRN [id number]   [facility] 4SW-A Attending Kanchan Thomson,    Hosp Day # 9 PCP Ravindra Torres MD     Subjective:  Lilian Mejia is a(n) 41year old male [facility] Cultures: Sputum 4/10 WBCs without growth thus far/pending blood cultures from 410 remain negative thus far    Radiology:      Chest x-ray today with tubes reviewed and seems stable--much improved from all 1 week prior  Bilateral left greater than r Keshav Hurtado MD  4/11/2020  45min   8:21 AM

## 2020-04-11 NOTE — PROGRESS NOTES
Vent settings listed below, no changes made overnight. BS have been mostly clear, somewhat diminished in the bases. Will continue to monitor closely.        04/11/20 2259   Vent Information   $ RT Standby Charge (per 15 min) 1  (vent check)   Ventilator Ini

## 2020-04-11 NOTE — PROGRESS NOTES
Goal: To achieve optimal ventilation and oxygenation.     INTERVENTIONS:  • Assess for changes in respiratory status  • Assess for changes in mentation and behavior  • Position to facilitate oxygenation and minimize respiratory effort  • Oxygen supplementat ABGPHT 7.40   NZEHUT9H 52*   MNGTT0Y 94   ABGHCO3 31.1*   ABGBE 5.7*   TEMP 54.0   BRIAN Not Applicable   SITE Arterial Line   DEV    THGB 8.5*

## 2020-04-11 NOTE — PROGRESS NOTES
Pharmacy Dosing Service: Vancomycin    Nelly Kelsey is a 41year old male for whom pharmacy has been dosing vancomycin. The patient is on day 2 of vancomycin treatment. The goal trough is 15-20 mcg/mL.     Labs:  Lab Results   Component Value Date/Time    VAN

## 2020-04-11 NOTE — PROGRESS NOTES
Reviewed overnight course with nursing staff - Dilaudid increased overnight for presumed discomfort as heart rate christian.  Ongoing bispectral index monitoring    No longer on vasopressor support despite multiple sedatives    Fi02 30% with PEEP 5     7.40/52/9

## 2020-04-11 NOTE — DIETARY NOTE
Nutrition Short Note:    Parenteral Nutrition - bag #4 TPN tonight to provide: 980 NPC (600 dextrose calories, 230 lipid calories), 20% lipids, 75 g protein, in 1200 ml fluid. Per pulm, will minimize fluid in tonight's bag.  This is meeting 70% calorie need

## 2020-04-11 NOTE — PLAN OF CARE
Pt remains intubated and sedated/paralyzed on ECMO. PCo2 elvated on am abg, Perfusionist adjusts sweep per Dr. Larissa Christianson order. Pco2 normalizes. Ketamine gtt decreased today.  Pt more sensitive to stimulation with both BIS & BP staying elevated for a time after r

## 2020-04-11 NOTE — PLAN OF CARE
Assumed care of pt @ 1930. Rec'd pt vented/sedated/on paralytic. Nimbex/Ketamine/Versed/Dilaudid drips noted. Heparin drip noted. BIS monitoring maintainted. ECMO via R. IJ catheter maintained. HR increasing from 90's to 120's.   Dilaudid drip increas

## 2020-04-11 NOTE — PROGRESS NOTES
[facility]  Progress Note     Jett Murguia Patient Status:  Inpatient    1975 MRN [id number]   [facility] 4SW-A Attending Araceli Rosales, [address] [facility] [address] Day # 5 PCP Sina Valenzuela MD     Chief Complaint: resp failure    S: Patient Kenith Landau Pro-Calcitonin  Recent Labs   Lab 04/08/20  0349   PCT 0.95*       Cardiac  Recent Labs   Lab 04/06/20  0424 04/08/20  0349   TROP  --  1.080*   PBNP 221*  --        Creatinine Kinase  Recent Labs   Lab 04/08/20  0349   CK 2,008*       Inflammatory M drugs  8. Hypernatremia  1. Resolved, monitor  9. HTN  10. DL  11. 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 Heparin drip  13. Anemia/Thrombocytopenia  1. Monitor CBC closely  14. 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 Off propofol  15.  Malnutrit

## 2020-04-12 ENCOUNTER — APPOINTMENT (OUTPATIENT)
Dept: GENERAL RADIOLOGY | Facility: HOSPITAL | Age: 45
DRG: 003 | End: 2020-04-12
Attending: INTERNAL MEDICINE
Payer: COMMERCIAL

## 2020-04-12 PROCEDURE — 99232 SBSQ HOSP IP/OBS MODERATE 35: CPT | Performed by: INTERNAL MEDICINE

## 2020-04-12 PROCEDURE — 99291 CRITICAL CARE FIRST HOUR: CPT | Performed by: INTERNAL MEDICINE

## 2020-04-12 PROCEDURE — 71045 X-RAY EXAM CHEST 1 VIEW: CPT | Performed by: INTERNAL MEDICINE

## 2020-04-12 RX ORDER — DOBUTAMINE HYDROCHLORIDE 200 MG/100ML
INJECTION INTRAVENOUS CONTINUOUS
Status: DISCONTINUED | OUTPATIENT
Start: 2020-04-13 | End: 2020-04-17

## 2020-04-12 RX ORDER — CISATRACURIUM BESYLATE 2 MG/ML
2 INJECTION INTRAVENOUS
Status: DISCONTINUED | OUTPATIENT
Start: 2020-04-12 | End: 2020-04-14 | Stop reason: ALTCHOICE

## 2020-04-12 RX ORDER — DEXTROSE MONOHYDRATE 50 MG/ML
INJECTION, SOLUTION INTRAVENOUS CONTINUOUS
Status: ACTIVE | OUTPATIENT
Start: 2020-04-12 | End: 2020-04-13

## 2020-04-12 RX ORDER — CISATRACURIUM BESYLATE 2 MG/ML
2 INJECTION INTRAVENOUS
Status: DISCONTINUED | OUTPATIENT
Start: 2020-04-12 | End: 2020-04-12 | Stop reason: SDUPTHER

## 2020-04-12 RX ORDER — HEPARIN SODIUM 5000 [USP'U]/ML
5000 INJECTION INTRAVENOUS; SUBCUTANEOUS AS NEEDED
Status: COMPLETED | OUTPATIENT
Start: 2020-04-13 | End: 2020-04-13

## 2020-04-12 RX ORDER — SPIRONOLACTONE 25 MG/1
25 TABLET ORAL ONCE
Status: COMPLETED | OUTPATIENT
Start: 2020-04-12 | End: 2020-04-12

## 2020-04-12 RX ORDER — DEXTROSE MONOHYDRATE 50 MG/ML
INJECTION, SOLUTION INTRAVENOUS CONTINUOUS
Status: DISCONTINUED | OUTPATIENT
Start: 2020-04-12 | End: 2020-04-13

## 2020-04-12 RX ORDER — POTASSIUM CHLORIDE 14.9 MG/ML
20 INJECTION INTRAVENOUS ONCE
Status: COMPLETED | OUTPATIENT
Start: 2020-04-12 | End: 2020-04-12

## 2020-04-12 NOTE — PLAN OF CARE
Patient remains on ventilator at minimal setting through night. Sedated and pharmaceutically paralyzed with Dilaudid, Versed, Ketamine, and Nimbex. BIS maintained around 50 through night. Patient in synch with ventilator and not overbreathing.  ECMO continu

## 2020-04-12 NOTE — PROGRESS NOTES
Reviewed overnight course with nursing staff and reviewed this am with Dr. Yovanny Hutchison. Did well overnight. Response when moved.     Remains on 30% FiO2 and PEEP 5    Tmax 100   117/62   74 regular    Remains on heavy sedation yet BIS increases with stimulation

## 2020-04-12 NOTE — DIETARY NOTE
Nutrition Short Note  TPN to be discontinued to day. Consult for advancement in tubefeeding. Tubefeeding Recommendations:     Recommend continuing Vital HP at 30ml/hr and advance by 10ml/hr q 12hrs to goal of 65ml/hr.   Recommend water flushes of 200ml

## 2020-04-12 NOTE — PROGRESS NOTES
[facility]  Progress Note     Denita Carney Patient Status:  Inpatient    1975 MRN [id number]   [facility] 4SW-A Attending Maite Waters,  [facility] Day # 8 PCP Allen Dillon MD     Chief Complaint: resp failure    S: Patient david Results    COVID-19  Lab Results   Component Value Date    COVID19 Detected (AA) 03/31/2020       Pro-Calcitonin  Recent Labs   Lab 04/08/20  0349   PCT 0.95*       Cardiac  Recent Labs   Lab 04/06/20  0424 04/08/20  0349   TROP  --  1.080*   PBNP 221*  -- negative  6. S/p Actemra x1 4/2  7. Trending inflamm markers, have started to downtrend  6. Leukocytosis continues to uptrend  7. Prolonged QTC  1. Monitor  2. Minimize any potentially offending drugs  8. Hypernatremia  1.  Trending up again, FWF, monitor

## 2020-04-12 NOTE — PROGRESS NOTES
Goal: To achieve optimal ventilation and oxygenation.     INTERVENTIONS:  • Assess for changes in respiratory status  • Assess for changes in mentation and behavior  • Position to facilitate oxygenation and minimize respiratory effort  • Oxygen supplementat Req'd equipment at bedside Bag mask     Recent Labs   Lab 04/12/20  0804   ABGPHT 7.43   INSZVX8W 44   YJONK5U 100   ABGHCO3 28.4*   ABGBE 3.9*   TEMP 97.9   BRIAN Not Applicable   SITE Arterial Line   DEV    THGB 9.6*

## 2020-04-12 NOTE — PROGRESS NOTES
[facility]  Progress Note    Jada Boggs Patient Status:  Inpatient    1975 MRN [id number]   [facility] 4SW-A Attending Atilio Reed, DO   Hosp Day # 8 PCP Issa Milton MD     Subjective:  Jada Boggs is a(n) 41year old male david 04/10/20  0426   PTP 13.4  --  14.2   INR 0.99  --  1.07   PTT  --  54.9*  --        Cultures:  Follow-up blood cultures remain negative  Sputum remains negative growth    Radiology:      Chest x-ray with bilateral pulmonary infiltrates overall seems to be

## 2020-04-12 NOTE — PLAN OF CARE
Stable on current ECMO settings, able to wean off paralytic today, remains comfortable on sedation. Discontinued TPN, tolerating feedings although suppository given for constipation/bloating. Plan in place for ECMO weaning at the bedside at 10am tomorrow (4

## 2020-04-12 NOTE — PROGRESS NOTES
[facility]                INFECTIOUS DISEASE PROGRESS NOTE    Denita Carney Patient Status:  Inpatient    1975 MRN [id number]   [facility] 4SW-A Attending Maite Waters MD   Hosp Day # 10 PCP Allen Dillon MD     Antibiotics#10 Time: 04/10/20 10:11 AM   Result Value Ref Range    Blood Culture Result No Growth 2 Days N/A         Radiology    cxr 4/8 reviewed    Problem list reviewed:  Patient Active Problem List:     Hyperlipidemia     Benign essential HTN     Impaired fasting glu

## 2020-04-13 ENCOUNTER — APPOINTMENT (OUTPATIENT)
Dept: CV DIAGNOSTICS | Facility: HOSPITAL | Age: 45
DRG: 003 | End: 2020-04-13
Attending: THORACIC SURGERY (CARDIOTHORACIC VASCULAR SURGERY)
Payer: COMMERCIAL

## 2020-04-13 ENCOUNTER — APPOINTMENT (OUTPATIENT)
Dept: GENERAL RADIOLOGY | Facility: HOSPITAL | Age: 45
DRG: 003 | End: 2020-04-13
Attending: INTERNAL MEDICINE
Payer: COMMERCIAL

## 2020-04-13 PROCEDURE — 93306 TTE W/DOPPLER COMPLETE: CPT | Performed by: THORACIC SURGERY (CARDIOTHORACIC VASCULAR SURGERY)

## 2020-04-13 PROCEDURE — 99291 CRITICAL CARE FIRST HOUR: CPT | Performed by: INTERNAL MEDICINE

## 2020-04-13 PROCEDURE — 71045 X-RAY EXAM CHEST 1 VIEW: CPT | Performed by: INTERNAL MEDICINE

## 2020-04-13 PROCEDURE — 99232 SBSQ HOSP IP/OBS MODERATE 35: CPT | Performed by: INTERNAL MEDICINE

## 2020-04-13 RX ORDER — ALBUMIN (HUMAN) 12.5 G/50ML
25 SOLUTION INTRAVENOUS EVERY 8 HOURS
Status: COMPLETED | OUTPATIENT
Start: 2020-04-13 | End: 2020-04-14

## 2020-04-13 RX ORDER — DEXTROSE MONOHYDRATE 50 MG/ML
INJECTION, SOLUTION INTRAVENOUS CONTINUOUS
Status: DISCONTINUED | OUTPATIENT
Start: 2020-04-13 | End: 2020-04-13

## 2020-04-13 RX ORDER — FUROSEMIDE 10 MG/ML
40 INJECTION INTRAMUSCULAR; INTRAVENOUS ONCE
Status: COMPLETED | OUTPATIENT
Start: 2020-04-13 | End: 2020-04-13

## 2020-04-13 NOTE — DIETARY NOTE
[facility]    NUTRITION ASSESSMENT    Pt does not meet malnutrition criteria.     NUTRITION DIAGNOSIS/PROBLEM:  Inadequate oral intake related to physiological causes as evidenced by vent status, intubated, sedated, prone. -ongoing    NUTRITION INTERVE FINDINGS:     1. Body Fat/Muscle Mass: well nourished per visual exam.     2. Fluid Accumulation: none per visual exam.    NUTRITION PRESCRIPTION:  Calories: 1630 calories/day [place] calculation 7.4 L/min vent, 37.8 Tmax)  Protein: 89 grams protein/da

## 2020-04-13 NOTE — PLAN OF CARE
Pt. Vented and sedated. Dilaudid, versed, and ketamine gtts infusing. BIS 40's-50's. TOF 4/4. Pt. Withdraws to pain. Heparin at 900u/hr. VV ECMO via R jugular. Pump speed 3390, flows 3.4, sweep 4.5,  O2 100%. Bedside sats 97% or greater.   SR o

## 2020-04-13 NOTE — PLAN OF CARE
Noted when reglan given at 0900 and 1500, sbp dropped to mid 70's, recovers back to 90's within a minute or two. Dose given slowly, followed by flush, no other meds infusing through line. Updated skyla Alvarez with observation.

## 2020-04-13 NOTE — PROGRESS NOTES
"NGA Boyle RN          " Reviewed overnight course with nursing staff    Tmax 99.9  Pressures running lower overnight  102/51  72 regular    Large diuresis over past 24 hours    HEENT: ETT  Neck no bleeding from ecmo site  Lungs no wheezing  Ht RRR  abd soft  Ext mild tissue edema

## 2020-04-13 NOTE — PROGRESS NOTES
[facility]                INFECTIOUS DISEASE PROGRESS NOTE    Carlos Au Patient Status:  Inpatient    1975 MRN [id number]   [facility] 4SW-A Attending Dina Watson MD   Hosp Day # 6 PCP Nona Jacob MD     Antibiotics#11 BLOOD CULTURE     Status: None (Preliminary result)    Collection Time: 04/10/20 10:11 AM   Result Value Ref Range    Blood Culture Result No Growth 3 Days N/A         Radiology    cxr 4/8 reviewed    Problem list reviewed:  Patient Active Problem List:

## 2020-04-13 NOTE — PROGRESS NOTES
[facility]  Progress Note    Abdi Limon Patient Status:  Inpatient    1975 MRN [id number]   [facility] 4SW-A Attending Ilir Luna MD   [facility] Day # 6 PCP Karishma Keller MD     Subjective:  Abdi Limon is a(n) 41year old male.   R HCT 29.0* 29.5* 29.2*   .0* 110.0* 110.0*       Recent Labs   Lab 04/11/20  0451 04/12/20  0408 04/12/20  1730 04/13/20  0511   * 150* 135* 133*   BUN 29* 31* 37* 37*   CREATSERUM 0.90 0.89 1.01 0.92   GFRAA 120 120 104 117   GFRNAA 104 104 diarrhea  · HTN, HLD  · GERD    Plan:  · Continue close monitoring of hemodynamics, titrate vasopressors for goal MAP >65. Off pressor support.   · Completed COVID treatment regimen: zithromycin x5 days(started zpak 3/31), hydroxychloroquine x 4 days (stop continues to increase  · Dispo: ICU    Critical care time 75 minutes  Discussed with RN, RT and APN, Dr. Claudeen Marker, Dr. Justa Stevens and Dr. Belen Sotelo.     Cheryl Amador MD, [place] [address] [facility]

## 2020-04-13 NOTE — CM/SW NOTE
Care Progression Note:  Active Acute Medical Issue:   Acute respiratory failure/ARDS due to COVID pneumonia, remains intubated and sedated;   s/p ECMO on 4/4- may wean tomorrow, Considering  wean off Bertrand. S/p Azitho/plaquenil.  S/p Actemra x 1 on 4/2  Sh

## 2020-04-13 NOTE — PROGRESS NOTES
[facility]  Progress Note     London Wagner Patient Status:  Inpatient    1975 MRN [id number]   [facility] 4SW-A Attending Romina Robbins,  [facility] Day # 6 PCP Dearl MD Miguel Angel     Chief Complaint: resp failure    S: Patient david Detected (AA) 03/31/2020       Pro-Calcitonin  Recent Labs   Lab 04/08/20  0349   PCT 0.95*       Cardiac  Recent Labs   Lab 04/08/20  0349   TROP 1.080*       Creatinine Kinase  Recent Labs   Lab 04/08/20  0349   CK 2,008*       Inflammatory Markers  Rece drugs  9. Hypernatremia  1. , FWF, monitor  10. HTN  11. DL  12. 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 Heparin drip  14. Anemia/Thrombocytopenia  1. Monitor CBC closely  15. 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 Off propofol  16.  Malnutritio

## 2020-04-13 NOTE — PLAN OF CARE
Received this a.m., intubated and vented, on versed, dilaudid, and ketamine gtts. Will open eyes to voice, withdrawing to pain. Hemodynamically stable.  , , and Leeroy, perfusionist at bedside this a.m. to attempt weaning of Ecmo, multiple

## 2020-04-13 NOTE — RESPIRATORY THERAPY NOTE
Received pt vented, VC 24/300/30%/+5, tolerating well. QID Albuterol and Atrovent MDIs. Will continue to monitor closely.

## 2020-04-14 ENCOUNTER — APPOINTMENT (OUTPATIENT)
Dept: GENERAL RADIOLOGY | Facility: HOSPITAL | Age: 45
DRG: 003 | End: 2020-04-14
Attending: THORACIC SURGERY (CARDIOTHORACIC VASCULAR SURGERY)
Payer: COMMERCIAL

## 2020-04-14 PROCEDURE — 99232 SBSQ HOSP IP/OBS MODERATE 35: CPT | Performed by: INTERNAL MEDICINE

## 2020-04-14 PROCEDURE — 71045 X-RAY EXAM CHEST 1 VIEW: CPT | Performed by: THORACIC SURGERY (CARDIOTHORACIC VASCULAR SURGERY)

## 2020-04-14 PROCEDURE — 99291 CRITICAL CARE FIRST HOUR: CPT | Performed by: INTERNAL MEDICINE

## 2020-04-14 RX ORDER — FUROSEMIDE 10 MG/ML
40 INJECTION INTRAMUSCULAR; INTRAVENOUS ONCE
Status: COMPLETED | OUTPATIENT
Start: 2020-04-14 | End: 2020-04-14

## 2020-04-14 RX ORDER — ALBUMIN (HUMAN) 12.5 G/50ML
SOLUTION INTRAVENOUS
Status: DISPENSED
Start: 2020-04-14 | End: 2020-04-14

## 2020-04-14 RX ORDER — DEXMEDETOMIDINE HYDROCHLORIDE 4 UG/ML
INJECTION, SOLUTION INTRAVENOUS CONTINUOUS
Status: DISCONTINUED | OUTPATIENT
Start: 2020-04-14 | End: 2020-04-27

## 2020-04-14 RX ORDER — HEPARIN SODIUM 1000 [USP'U]/ML
1000 INJECTION, SOLUTION INTRAVENOUS; SUBCUTANEOUS ONCE
Status: COMPLETED | OUTPATIENT
Start: 2020-04-14 | End: 2020-04-14

## 2020-04-14 RX ORDER — DEXMEDETOMIDINE HYDROCHLORIDE 4 UG/ML
INJECTION, SOLUTION INTRAVENOUS
Status: COMPLETED
Start: 2020-04-14 | End: 2020-04-14

## 2020-04-14 RX ORDER — SODIUM CHLORIDE 9 MG/ML
INJECTION, SOLUTION INTRAVENOUS ONCE
Status: COMPLETED | OUTPATIENT
Start: 2020-04-14 | End: 2020-04-14

## 2020-04-14 RX ORDER — ENOXAPARIN SODIUM 100 MG/ML
1 INJECTION SUBCUTANEOUS ONCE
Status: COMPLETED | OUTPATIENT
Start: 2020-04-14 | End: 2020-04-14

## 2020-04-14 RX ORDER — ENOXAPARIN SODIUM 100 MG/ML
1 INJECTION SUBCUTANEOUS EVERY 12 HOURS SCHEDULED
Status: DISCONTINUED | OUTPATIENT
Start: 2020-04-14 | End: 2020-04-22

## 2020-04-14 NOTE — PROGRESS NOTES
Met Dr. Jamie Hopper and Dr. Yoana Blackmon at bedside earlier this afternoon. ECMO weaned down successfully and removed. Appears quite responsive when sedation decreased. Weaning parameters from ventilatory standpoint as outlined by Dr. Yoana Blackmon.     Will reas

## 2020-04-14 NOTE — PROGRESS NOTES
Goal: To achieve optimal ventilation and oxygenation.     INTERVENTIONS:  • Assess for changes in respiratory status  • Assess for changes in mentation and behavior  • Position to facilitate oxygenation and minimize respiratory effort  • Oxygen supplementat Site Condition Dry   Req'd equipment at bedside Bag mask     Recent Labs   Lab 04/14/20  0735   ABGPHT 7.41   CQSKVW8C 40   NRPVL7P 114*   ABGHCO3 24.6   ABGBE 0.1   TEMP 56.1   BRIAN Not Applicable   SITE Arterial Line   DEV    THGB 12.0*

## 2020-04-14 NOTE — PLAN OF CARE
Pt received intubated and sedated on full vent support including ECMO. Pt DALAL to painful stimuli. Opens eyes to name being called and while suctioning from ETT and orally. Pt not following any commands. Pt remains on ECMO.  Right IJ cannulation dressing sit

## 2020-04-14 NOTE — PROGRESS NOTES
[facility]  Progress Note    Brandy Buenrostro Patient Status:  Inpatient    1975 MRN [id number]   [facility] 4SW-A Attending Jim Gonzalez MD   [facility] Day # 15 PCP George Tejeda MD     Subjective/ Interval hx:  Brandy Buenrostro is a(n) 41 year Review:  Recent Labs   Lab 04/12/20  0413 04/13/20  0522 04/14/20  0405   WBC 11.8* 8.5 5.8   HGB 9.3* 9.4* 8.0*   HCT 29.5* 29.2* 25.3*   .0* 110.0* 127.0*       Recent Labs   Lab 04/12/20  0408 04/12/20  1730 04/13/20  0511 04/13/20  2303 04/14/20 bilateral interstitial and alveolar opacities. No new findings.  ETT in position  US abdomen 4/10- colelithiasis and GB sludge, bilateral pleural effusions    TTE 4/8- EF 70%, no wall motion abnormalities    • Albumin Human       • Insulin Aspart Pen  4-20 negative. WBC now decreasing but persistent low grade fevers. · Continue to adjust mechanical ventilation today; repeat ABG and adjust accordingly. PaO2 improved. Weaned fio2 and PEEP.  As above, weaning ECMO trial today  · Manage per ARDSnet protocol

## 2020-04-14 NOTE — PROGRESS NOTES
04/13/20 1612   Vent Information   $ RT Standby Charge (per 15 min) 1   Ventilator Initiation 04/02/20   Ventilation Day(s) 11   Interface Invasive   Vent Type    Vent ID 9   Vent Mode VC/AC   Settings   FiO2 (%) 40 %   Resp Rate (Set) 20   Vt (Set

## 2020-04-14 NOTE — PROGRESS NOTES
[facility]                INFECTIOUS DISEASE PROGRESS NOTE    Marsha Cobb Patient Status:  Inpatient    1975 MRN [id number]   [facility] 4SW-A Attending Odalis Marion MD   Hosp Day # 15 PCP Johny Chin MD     Antibiotics#12 111   CO2 30.0 31.0 29.0  --  27.0   ALKPHO 52  --  49  --  44*   *  --  124*  --  89*   ALT 59  --  92*  --  82*   BILT 2.1*  --  3.8*  --  3.5*   TP 5.9*  --  5.9*  --  6.7       Vancomycin Trough (ug/mL)   Date Value   04/11/2020 16.3     Microbi

## 2020-04-14 NOTE — PROGRESS NOTES
Critical care addendum    Pt stable overnight on low flow via ECMO. Vent weaned to 40% and PEEP 5. ECMO turned down and then off and pt decannulated by Dr. Reina Larios at the bedside.   Pt remained stable on the current vent settings PRVC 20/430/40/5 and Bertrand at 2

## 2020-04-14 NOTE — PROGRESS NOTES
Reviewed overnight course with nursing staff. Reviewed with Dr. Nichole Beltran. Doing well. Responds to stimuli.     Remains on 40% FiO2 with PEEP 5    Tele sinus - no significant ventricular ectopy    Tmax 99.5  76 regular   105/57    HEENT ETT  Neck no bleeding f

## 2020-04-14 NOTE — PROGRESS NOTES
[facility]  Progress Note     Marsha Cobb Patient Status:  Inpatient    1975 MRN [id number]   [facility] 4SW-A Attending Odalis Marion MD   Hosp Day # 15 PCP Johny Chin MD     Chief Complaint: resp failure    S: ECMO was suc 92*  --  82*   BILT 2.1*  --  3.8*  --  3.5*   TP 5.9*  --  5.9*  --  6.7       Recent Labs   Lab 04/10/20  0426   PTP 14.2   INR 1.07            COVID-19 Lab Results    COVID-19  Lab Results   Component Value Date    COVID19 Detected (AA) 03/31/2020 4/10 empirically as well, now held, ID managing  3. Cultures re-drawn 4/10 and also negative  7. Leukocytosis has normalized  8. Prolonged QTC  1. Monitor  2. Minimize any potentially offending drugs  9. Hypernatremia - imrpved  1. , FWF, monitor  10.  HTN

## 2020-04-15 ENCOUNTER — APPOINTMENT (OUTPATIENT)
Dept: GENERAL RADIOLOGY | Facility: HOSPITAL | Age: 45
DRG: 003 | End: 2020-04-15
Attending: NURSE PRACTITIONER
Payer: COMMERCIAL

## 2020-04-15 PROCEDURE — 99291 CRITICAL CARE FIRST HOUR: CPT | Performed by: INTERNAL MEDICINE

## 2020-04-15 PROCEDURE — 99232 SBSQ HOSP IP/OBS MODERATE 35: CPT | Performed by: INTERNAL MEDICINE

## 2020-04-15 PROCEDURE — 99233 SBSQ HOSP IP/OBS HIGH 50: CPT | Performed by: HOSPITALIST

## 2020-04-15 PROCEDURE — 71045 X-RAY EXAM CHEST 1 VIEW: CPT | Performed by: NURSE PRACTITIONER

## 2020-04-15 RX ORDER — LORAZEPAM 2 MG/ML
2 INJECTION INTRAMUSCULAR ONCE
Status: COMPLETED | OUTPATIENT
Start: 2020-04-15 | End: 2020-04-15

## 2020-04-15 RX ORDER — FUROSEMIDE 10 MG/ML
40 INJECTION INTRAMUSCULAR; INTRAVENOUS
Status: DISCONTINUED | OUTPATIENT
Start: 2020-04-15 | End: 2020-04-19

## 2020-04-15 RX ORDER — LORAZEPAM 2 MG/ML
INJECTION INTRAMUSCULAR
Status: COMPLETED
Start: 2020-04-15 | End: 2020-04-15

## 2020-04-15 RX ORDER — CISATRACURIUM BESYLATE 2 MG/ML
0.2 INJECTION INTRAVENOUS ONCE
Status: COMPLETED | OUTPATIENT
Start: 2020-04-15 | End: 2020-04-15

## 2020-04-15 NOTE — PROGRESS NOTES
[facility]  Progress Note    Derrick Potter Patient Status:  Inpatient    1975 MRN [id number]   [facility] 4SW-A Attending Patrecia Cooks, MD   [facility] Day # 15 PCP Elizabeth Pulido MD     Subjective/ Interval hx:  Derrick Potter is a(n) 41 year guarding, no rebound, hypoactive BS   Extremity: No edema, no cyanosis   Neurological: intubated/sedated    Lab Data Review:  Recent Labs   Lab 04/13/20  0522 04/14/20  0405 04/14/20  1414 04/15/20  0430   WBC 8.5 5.8  --  5.5   HGB 9.4* 8.0* 8.6* 9.7*   H position  US abdomen 4/10- colelithiasis and GB sludge, bilateral pleural effusions    TTE 4/8- EF 70%, no wall motion abnormalities    • furosemide  40 mg Intravenous BID (Diuretic)   • enoxaparin  1 mg/kg Subcutaneous 2 times per day   • Insulin Aspart P PEEP.  · Manage per ARDSnet protocol with high PEEP table, goal plateau of less than or equal to 30   · Wean o2 for sats >88% and/or PaO2 >55;   · Wean sedation for goal RASS 0 to -2; would plan to wean dilaudid and versed further forr goal. When able, antoine

## 2020-04-15 NOTE — PROGRESS NOTES
[facility]  Progress Note     Carlos Au Patient Status:  Inpatient    1975 MRN [id number]   [facility] 4SW-A Attending Dina Watson MD   Hosp Day # 15 PCP Nona Jacob MD     Chief Complaint: resp failure    S: ECMO was suc BILT 3.8*  --  3.5* 1.9   TP 5.9*  --  6.7 7.0       Recent Labs   Lab 04/10/20  0426   PTP 14.2   INR 1.07            COVID-19 Lab Results    COVID-19  Lab Results   Component Value Date    COVID19 Detected (AA) 03/31/2020       Pro-Calcitonin  No resul temps  1. ceft -> zosyn on 4/2 -> Meropenem 4/10  2. Vancomycin started 4/10 empirically as well, now held, ID managing  3. Cultures re-drawn 4/10 and also negative  7. Leukocytosis has normalized  8. Prolonged QTC  1. Monitor  2.  Minimize any potentially

## 2020-04-15 NOTE — PROGRESS NOTES
[facility]                INFECTIOUS DISEASE PROGRESS NOTE    Dalton Crews Patient Status:  Inpatient    1975 MRN [id number]   [facility] 4SW-A Attending Felicia Myles MD   Hosp Day # 15 PCP Angela Alvarez MD     Antibiotics#13 BILT 3.8*  --  3.5* 1.9   TP 5.9*  --  6.7 7.0       Vancomycin Trough (ug/mL)   Date Value   04/11/2020 16.3     Microbiology    Hospital Encounter on 04/02/20   1.  BLOOD CULTURE     Status: None    Collection Time: 04/10/20 10:11 AM   Result Value Ref

## 2020-04-15 NOTE — PROGRESS NOTES
Reviewed overnight course with nursing staff.     Tmax 101.5 at 8 pm - now afebrile    102/60  68 regular - no significant ventricular ectopy    Remains on only 40% FiO2 with PEEP 5    HEENT: ETT  Neck no bleeding from cannulation site  Lungs no wheezing  H

## 2020-04-15 NOTE — PROGRESS NOTES
[facility] Progress Note   Length of Stay: 13    Subjective: Off ECMO. Discussed with RN, patient does have some oozing. Was previously on hep gtt now on therapeutic lovenox.      ROS: 12 Point ROS completed and pertinent positives are a BILT 3.8*  --  3.5* 1.9   TP 5.9*  --  6.7 7.0       Imaging: Reviewed     Assessment and Plan: Thrombocytopenia: resolved    COVID+: He previously on Hep gtt to maintain patency of ECMO circuit. He was recently transitioned to lovenox 1 mg/kg BID.  In

## 2020-04-15 NOTE — PLAN OF CARE
Received pt orally intubated, sedated with precedex, dilaudid, versed. Do not follow any commands, slightly opens eyes when suctioned orally and via ett, slightly moves upper extremities, no movements to lower extremities.  Rt ij dressing dry, intact, no bl limits  Description  INTERVENTIONS:  - Monitor labs and rhythm and assess patient for signs and symptoms of electrolyte imbalances  - Administer electrolyte replacement as ordered  - Monitor response to electrolyte replacements, including rhythm and repeat

## 2020-04-15 NOTE — PLAN OF CARE
Received patient and took over care around 0730. Pt remains on ventilator on versed, dilaudid, precedex for pain/sedation. Pt not responding to commands but opens eyes spontaneously and moves all extremities spontaneously.  Attempted to wean patient on roderick

## 2020-04-16 PROCEDURE — 99233 SBSQ HOSP IP/OBS HIGH 50: CPT | Performed by: HOSPITALIST

## 2020-04-16 PROCEDURE — 99291 CRITICAL CARE FIRST HOUR: CPT | Performed by: INTERNAL MEDICINE

## 2020-04-16 RX ORDER — HYDROMORPHONE HYDROCHLORIDE 1 MG/ML
2 SOLUTION ORAL EVERY 4 HOURS
Status: DISCONTINUED | OUTPATIENT
Start: 2020-04-16 | End: 2020-04-18

## 2020-04-16 RX ORDER — SENNOSIDES 8.8 MG/5ML
5 LIQUID ORAL 2 TIMES DAILY
Status: DISCONTINUED | OUTPATIENT
Start: 2020-04-16 | End: 2020-04-30

## 2020-04-16 RX ORDER — LORAZEPAM 2 MG/ML
2 CONCENTRATE ORAL EVERY 6 HOURS
Status: DISCONTINUED | OUTPATIENT
Start: 2020-04-16 | End: 2020-04-17

## 2020-04-16 RX ORDER — POTASSIUM CHLORIDE 14.9 MG/ML
20 INJECTION INTRAVENOUS ONCE
Status: COMPLETED | OUTPATIENT
Start: 2020-04-16 | End: 2020-04-16

## 2020-04-16 NOTE — CM/SW NOTE
04/16/20 1400   CM/SW Referral Data   Referral Source Social Work (self-referral)   Reason for Referral   (Assessment)   Informant   (Sister Charlotte Herron)   Patient [facility]   Patient lives with   (Parents and sister)   Patient S

## 2020-04-16 NOTE — PROGRESS NOTES
[facility]  Progress Note     Derrick Potter Patient Status:  Inpatient    1975 MRN [id number]   [facility] 4SW-A Attending Patrecia Cooks, MD   Hosp Day # 15 PCP Elizabeth Pulido MD     Chief Complaint: resp failure    S: did poorly o 14.2   INR 1.07            COVID-19 Lab Results    COVID-19  Lab Results   Component Value Date    COVID19 Detected (AA) 03/31/2020       Pro-Calcitonin  No results for input(s): PCT in the last 168 hours.     Cardiac  No results for input(s): TROP, PBNP in 4/2  4. Trending inflamm markers, stable  6. Low grade temps  1. ceft -> zosyn on 4/2 -> Meropenem 4/10  2. Vancomycin started 4/10 empirically as well, now held, ID managing  3. Cultures re-drawn 4/10 and also negative  7. Leukocytosis has normalized  8.

## 2020-04-16 NOTE — PROGRESS NOTES
Reviewed overnight course with nursing staff.      Very agitated when sedation decreased as would be expected given 2 weeks of multiple high dose sedatives    T max 100.5 late yesterday morning - afebrile since    122/79   69 regular    HEENT:ETT - thick se

## 2020-04-16 NOTE — RESPIRATORY THERAPY NOTE
Patient received on vent with the following settings VC+ RR16 Vt 430 40% +5 and patient is tolerating settings well. Around 31 75 62 the patient started to lose volumes on the vent. It was noted that the marmolejo bag had split open resulting in volume loss.  Bryson Shank

## 2020-04-16 NOTE — PROGRESS NOTES
[facility]                INFECTIOUS DISEASE PROGRESS NOTE    Danitza Jimenez Patient Status:  Inpatient    1975 MRN [id number]   [facility] 4SW-A Attending Patsy Wilkinson MD   Hosp Day # 15 PCP Tk Martinez MD     Antibiotics#14 Vancomycin Trough (ug/mL)   Date Value   04/11/2020 16.3     Microbiology    Hospital Encounter on 04/02/20   1.  BLOOD CULTURE     Status: None (Preliminary result)    Collection Time: 04/14/20 11:33 PM   Result Value Ref Range    Blood Culture Resul

## 2020-04-16 NOTE — PLAN OF CARE
Received patient and assumed care around 0730. Pt continues to be on ventilator. Breathing trial attempted, pt becomes agitated and restless. Per Dr. Daysi Tavera, trial ended. Pt continues to be agitated and restless despite dilaudid, versed, fentanyl drip.  P

## 2020-04-16 NOTE — PROGRESS NOTES
[facility]  Progress Note    Yosi Laguerre Patient Status:  Inpatient    1975 MRN [id number]   [facility] 4SW-A Attending Raúl Lee MD   [facility] Day # 15 PCP Odalis Hodge MD     Subjective/ Interval hx:  Yosi Laguerre is a(n) 41 year murmur.    Abdomen: Soft, non-tender, non-distended, no masses, no guarding, no rebound, hypoactive BS   Extremity: No edema, no cyanosis   Neurological: as above    Lab Data Review:  Recent Labs   Lab 04/14/20  0405 04/14/20  1414 04/15/20  0430 04/16/20 4/10- colelithiasis and GB sludge, bilateral pleural effusions    TTE 4/8- EF 70%, no wall motion abnormalities    • furosemide  40 mg Intravenous BID (Diuretic)   • enoxaparin  1 mg/kg Subcutaneous 2 times per day   • Insulin Aspart Pen  4-20 Units Subcut agitation  · Continue to wean sedation for goal RASS of 0; worsening agitation overnight leading to escalation of sedation. Will start enteral dilaudid and ativan to aid weaning of IV regimen, which will be slowly titrated for control  · Add seroquel 12. 5

## 2020-04-16 NOTE — PROGRESS NOTES
04/16/20 5419   Clinical Encounter Type   Visited With Health care provider;Patient  (From outside of room)   Routine Visit Introduction   Continue Visiting No  (upon request or as needed)   Yazidi Encounters   Yazidi Needs Prayer  ( provi

## 2020-04-16 NOTE — PAYOR COMM NOTE
--------------  CONTINUED STAY REVIEW    Payor: [employer]/HMO/POS/EPO  Subscriber #:  [id number]  Authorization Number: [id number]      4/15 EDSON    Nain Cox is a(n) 41year old male.  No acute events overnight, remains intubated and sedated Neurological: intubated/sedated     Lab Data Review:      Recent Labs   Lab 04/15/20  0430   WBC 5.5   HGB 9.7*   HCT 29.5*   .0             Recent Labs   Lab 04/15/20  0421   *   BUN 34*   CREATSERUM 0.81   GFRAA 125   GFRNAA 108   C for goal RASS 0 to -2; would plan to wean dilaudid and versed further forr goal. When able, will convert to IV prn   · Recheck EKG for QTc assessment  · Monitor COVID labs/ inflammatory markers; some improvement  · Continue tube feeds, FWF. Off TPN.   · Mi 4/14  14. Anemia/Thrombocytopenia  1. Monitor CBC closely  15. 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 Off propofol  16. Malnutrition  17. High residual TF  1. Hold for now, resume at lower rate later today      Plan of care: as above.     prognosis is guarde DAY:  acetaminophen (TYLENOL) 160 MG/5ML oral liquid 650 mg     Date Action Dose Route User    4/15/2020 1146 Given 650 mg Oral Jaime Schulte RN      Albuterol Sulfate  (90 Base) MCG/ACT inhaler 8 puff     Date Action Dose Route User    4/15/2020 1343 Rate/Dose Change 1.1 mcg/kg/hr × 75 kg (Dosing Weight) Intravenous Elvi Leal RN    4/15/2020 1333 Rate/Dose Change 0.9 mcg/kg/hr × 75 kg (Dosing Weight) Intravenous Elvi Leal RN    4/15/2020 1121 Rate/Dose Change 0.7 mcg/kg/hr × 75 4/15/2020 1729 Bolus from Bag 1 mg Intravenous Meagan Reed RN    4/15/2020 1333 Bolus from Bag 1 mg Intravenous Curly Riedel, RN    4/15/2020 1158 Bolus from Bag 1 mg Intravenous Meagan Reed RN    4/15/2020 1126 Bolus from Bag 1 mg Intravenou Intravenous Bakari Jones RN    4/15/2020 1341 Given 2 mg Intravenous Bakari Jones RN    4/15/2020 1143 Given 2 mg Intravenous Jacqueline Lanier RN    4/15/2020 1136 Given 2 mg Intravenous Jacqueline Lanier RN    4/15/2020 1100 Given 2 mg Jaren Rodgers in Sodium Chloride 0.9 % 10 mL IV push     Date Action Dose Route User    4/16/2020 0629 Given 40 mg Intravenous Ashvin Huffman RN    4/15/2020 1713 Given 40 mg Intravenous Robby Winn RN          Procedures:      Plan:

## 2020-04-16 NOTE — PLAN OF CARE
Received pt well sedated with versed, precedex, dilaudid, at times bites on ett, peek pressure up to 40-45, suctioned via ett, oral cares given. Will attempt to titrate down sedation.      Versed titrated down, around 4 am pt woke up, all over in the bed, a including physical limitations  - Instruct pt to call for assistance with activity based on assessment  - Modify environment to reduce risk of injury  - Provide assistive devices as appropriate  - Consider OT/PT consult to assist with strengthening/mobilit

## 2020-04-17 ENCOUNTER — APPOINTMENT (OUTPATIENT)
Dept: GENERAL RADIOLOGY | Facility: HOSPITAL | Age: 45
DRG: 003 | End: 2020-04-17
Attending: INTERNAL MEDICINE
Payer: COMMERCIAL

## 2020-04-17 PROCEDURE — 99233 SBSQ HOSP IP/OBS HIGH 50: CPT | Performed by: HOSPITALIST

## 2020-04-17 PROCEDURE — 99291 CRITICAL CARE FIRST HOUR: CPT | Performed by: INTERNAL MEDICINE

## 2020-04-17 PROCEDURE — 71045 X-RAY EXAM CHEST 1 VIEW: CPT | Performed by: INTERNAL MEDICINE

## 2020-04-17 RX ORDER — ALBUTEROL SULFATE 90 UG/1
4 AEROSOL, METERED RESPIRATORY (INHALATION) 3 TIMES DAILY
Status: DISCONTINUED | OUTPATIENT
Start: 2020-04-17 | End: 2020-04-25

## 2020-04-17 RX ORDER — POLYETHYLENE GLYCOL 3350 17 G/17G
17 POWDER, FOR SOLUTION ORAL DAILY
Status: DISCONTINUED | OUTPATIENT
Start: 2020-04-17 | End: 2020-04-30

## 2020-04-17 RX ORDER — SPIRONOLACTONE 25 MG/1
25 TABLET ORAL
Status: DISCONTINUED | OUTPATIENT
Start: 2020-04-17 | End: 2020-04-19

## 2020-04-17 RX ORDER — QUETIAPINE 25 MG/1
25 TABLET, FILM COATED ORAL 2 TIMES DAILY
Status: DISCONTINUED | OUTPATIENT
Start: 2020-04-17 | End: 2020-04-18

## 2020-04-17 RX ORDER — POTASSIUM CHLORIDE 29.8 MG/ML
40 INJECTION INTRAVENOUS ONCE
Status: COMPLETED | OUTPATIENT
Start: 2020-04-17 | End: 2020-04-17

## 2020-04-17 RX ORDER — LORAZEPAM 2 MG/ML
3 CONCENTRATE ORAL EVERY 6 HOURS
Status: DISCONTINUED | OUTPATIENT
Start: 2020-04-17 | End: 2020-04-18

## 2020-04-17 NOTE — PLAN OF CARE
Assumed care after receiving morning report. Patient was very restless and agitated, moving around in bed and not tolerating ventilator.  A total of 8mg PRN IV Versed and 4mg IV Dilaudid administered before patient was able to relax and be synchronous with

## 2020-04-17 NOTE — PROGRESS NOTES
Reviewed overnight course with nursing staff.     Continuing to have difficulty weaning sedation without precipitating marked agitation    Remains on midazolam, precedex and hydromorphone    Vent settings unchanged - PEEP 5  40%    T max 100.6  - now [employer] [place]

## 2020-04-17 NOTE — PROGRESS NOTES
[facility]  Progress Note    Radha Day Patient Status:  Inpatient    1975 MRN [id number]   [facility] 4SW-A Attending Magda Black MD   [facility] Day # 13 PCP Oscar Cid MD     Subjective/ Interval hx:  Radha Day is a(n) 41 year non-tender, non-distended, no masses, no guarding, no rebound, hypoactive BS   Extremity: No edema, no cyanosis   Neurological: as above    Lab Data Review:  Recent Labs   Lab 04/15/20  0430 04/16/20  0524 04/17/20  0446   WBC 5.5 4.3 5.3   HGB 9.7* 9.8* 9 4/8- EF 70%, no wall motion abnormalities    • potassium chloride  40 mEq Intravenous Once   • spironolactone  25 mg Oral BID (Diuretic)   • QUEtiapine Fumarate  12.5 mg Per NG Tube BID   • HYDROmorphone HCl  2 mg Per NG Tube Q4H   • LORazepam  2 mg Per NG meropenem day 7 today; cultures continue to remain ngtd  · Continue close monitoring of hemodynamics, goal MAP >65. Off pressor support.   · Completed COVID treatment regimen: zithromycin x5 days(started zpak 3/31), hydroxychloroquine x 4 days (stopped 4/5

## 2020-04-17 NOTE — PROGRESS NOTES
Goal: To achieve optimal ventilation and oxygenation.     INTERVENTIONS:  • Assess for changes in respiratory status  • Assess for changes in mentation and behavior  • Position to facilitate oxygenation and minimize respiratory effort  • Oxygen supplementat Location Left   Secured by [employer] tube robison   Site Condition Dry   Req'd equipment at bedside Bag mask

## 2020-04-17 NOTE — DIETARY NOTE
[facility]    NUTRITION ASSESSMENT    Pt does not meet malnutrition criteria.     NUTRITION DIAGNOSIS/PROBLEM:  Inadequate oral intake related to physiological causes as evidenced by vent status, intubated, sedated, prone. -ongoing    NUTRITION INTERVE 24.94 kg/m².   IBW: 54 kg    WEIGHT HISTORY:   04/02/20 : 75 kg (165 lb 5.5 oz)  03/31/20 : 75 kg (165 lb 5.5 oz)    NUTRITION:  Diet: NPO    FOOD/NUTRITION RELATED HISTORY:  Appetite: -  Intake: 0%  Intake Meeting Needs: No  Food Allergies: No  Cultural/Et

## 2020-04-17 NOTE — PROGRESS NOTES
[facility]                INFECTIOUS DISEASE PROGRESS NOTE    Connie Gutierrez Patient Status:  Inpatient    1975 MRN [id number]   [facility] 4SW-A Attending Pernell Castleman, MD   Hosp Day # 13 PCP Daniella Mosley MD     Antibiotics#15 Microbiology    Hospital Encounter on 04/02/20   1.  BLOOD CULTURE     Status: None (Preliminary result)    Collection Time: 04/14/20 11:33 PM   Result Value Ref Range    Blood Culture Result No Growth 2 Days N/A         Radiology    cxr 4/8 reviewed

## 2020-04-17 NOTE — PLAN OF CARE
Patient agitated overnight with cares and intermittently, on versed, dilaudid and precedex. Tolerating vent settings. Afebrile this am. Adequate urine output. Multiple loose stools, miralax and supp. Given 4/16. Tube feeds on hold d/t high residuals.  Will

## 2020-04-17 NOTE — RESPIRATORY THERAPY NOTE
Received pt vented, VC+ 16/430/40%/+5, tolerating well. Breath sounds clear/diminished. Albuterol and Atrovent MDIs QID. Pt frequently agitated. Will continue to monitor closely.

## 2020-04-17 NOTE — PROGRESS NOTES
[facility]  Progress Note     London Wganer Patient Status:  Inpatient    1975 MRN [id number]   [facility] 4SW-A Attending Romina Robbins MD   Hosp Day # 13 PCP Dearl MD Miguel Angel     Chief Complaint: resp failure    S: still very a hours.    Inflammatory Markers  Recent Labs   Lab 04/14/20  0357 04/15/20  0421 04/16/20  0457 04/17/20  0446   CRP <0.29 0.38* 0.55* 0.63*   ANJEL 1,736.9* 1,811.0* 1,638.2* 1,371.8*   * 884* 844* 725*   DDIMER 7.25*  --  3.52* 2.96*       Imaging: I negative  7. Leukocytosis has normalized  8. Prolonged QTC  1. Monitor  2. Minimize any potentially offending drugs  9. Hypernatremia - imrpved  1. , FWF, monitor  10. HTN  11. DL  12. 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  Transitioning from

## 2020-04-18 ENCOUNTER — APPOINTMENT (OUTPATIENT)
Dept: GENERAL RADIOLOGY | Facility: HOSPITAL | Age: 45
DRG: 003 | End: 2020-04-18
Attending: INTERNAL MEDICINE
Payer: COMMERCIAL

## 2020-04-18 PROCEDURE — 99233 SBSQ HOSP IP/OBS HIGH 50: CPT | Performed by: HOSPITALIST

## 2020-04-18 PROCEDURE — 99291 CRITICAL CARE FIRST HOUR: CPT | Performed by: INTERNAL MEDICINE

## 2020-04-18 PROCEDURE — 71045 X-RAY EXAM CHEST 1 VIEW: CPT | Performed by: INTERNAL MEDICINE

## 2020-04-18 RX ORDER — POTASSIUM CHLORIDE 1.5 G/1.77G
40 POWDER, FOR SOLUTION ORAL EVERY 4 HOURS
Status: DISCONTINUED | OUTPATIENT
Start: 2020-04-18 | End: 2020-04-18

## 2020-04-18 RX ORDER — LORAZEPAM 1 MG/1
TABLET ORAL
Status: DISCONTINUED
Start: 2020-04-18 | End: 2020-04-18 | Stop reason: WASHOUT

## 2020-04-18 RX ORDER — BISACODYL 10 MG
10 SUPPOSITORY, RECTAL RECTAL DAILY
Status: DISCONTINUED | OUTPATIENT
Start: 2020-04-19 | End: 2020-04-19

## 2020-04-18 RX ORDER — BISACODYL 10 MG
10 SUPPOSITORY, RECTAL RECTAL DAILY
Status: DISCONTINUED | OUTPATIENT
Start: 2020-04-18 | End: 2020-04-18

## 2020-04-18 RX ORDER — MIDAZOLAM HYDROCHLORIDE 1 MG/ML
INJECTION INTRAMUSCULAR; INTRAVENOUS
Status: COMPLETED
Start: 2020-04-18 | End: 2020-04-18

## 2020-04-18 RX ORDER — QUETIAPINE 100 MG/1
100 TABLET, FILM COATED ORAL 2 TIMES DAILY
Status: DISCONTINUED | OUTPATIENT
Start: 2020-04-18 | End: 2020-04-20

## 2020-04-18 RX ORDER — FENOFIBRATE 134 MG/1
134 CAPSULE ORAL DAILY
Status: DISCONTINUED | OUTPATIENT
Start: 2020-04-18 | End: 2020-05-05

## 2020-04-18 RX ORDER — MIDAZOLAM HYDROCHLORIDE 1 MG/ML
1 INJECTION INTRAMUSCULAR; INTRAVENOUS EVERY 5 MIN PRN
Status: DISCONTINUED | OUTPATIENT
Start: 2020-04-18 | End: 2020-04-20

## 2020-04-18 RX ORDER — ALBUMIN (HUMAN) 12.5 G/50ML
25 SOLUTION INTRAVENOUS ONCE
Status: COMPLETED | OUTPATIENT
Start: 2020-04-18 | End: 2020-04-18

## 2020-04-18 RX ORDER — BISACODYL 10 MG
20 SUPPOSITORY, RECTAL RECTAL ONCE
Status: COMPLETED | OUTPATIENT
Start: 2020-04-18 | End: 2020-04-18

## 2020-04-18 RX ORDER — QUETIAPINE 25 MG/1
50 TABLET, FILM COATED ORAL 2 TIMES DAILY
Status: DISCONTINUED | OUTPATIENT
Start: 2020-04-18 | End: 2020-04-18

## 2020-04-18 RX ORDER — LORAZEPAM 2 MG/ML
2 CONCENTRATE ORAL EVERY 4 HOURS
Status: DISCONTINUED | OUTPATIENT
Start: 2020-04-18 | End: 2020-04-20

## 2020-04-18 RX ORDER — LORAZEPAM 2 MG/ML
3 CONCENTRATE ORAL EVERY 4 HOURS
Status: DISCONTINUED | OUTPATIENT
Start: 2020-04-18 | End: 2020-04-18

## 2020-04-18 RX ORDER — HYDROMORPHONE HYDROCHLORIDE 1 MG/ML
4 SOLUTION ORAL EVERY 4 HOURS
Status: DISCONTINUED | OUTPATIENT
Start: 2020-04-18 | End: 2020-04-20

## 2020-04-18 NOTE — PROGRESS NOTES
[facility]  Psychiatric Consultation    Yosi Laguerre YOB: 1975   Age/Gender 41year old male MRN [id number]   [facility] 4SW-A Attending Ynes Almeida MD   [facility] Day # 12 PCP Odalis Hodge MD     Date of Service:  4/18/2 spironolactone  25 mg Oral BID (Diuretic)   • Albuterol Sulfate HFA  4 puff Inhalation TID   • PEG 3350  17 g Oral Daily   • methylnaltrexone bromide  12 mg Subcutaneous Q48H   • Senna  5 mL Oral BID   • furosemide  40 mg Intravenous BID (Diuretic)   • blanche likely dependence now after having required sedation for more than 2 weeks.  Opioid and benzodiazepine withdrawal likely    Secondary Psychiatric Diagnoses         Rule Out Diagnoses     Underlying psychiatric illness       Pervasive Diagnoses           Per

## 2020-04-18 NOTE — PLAN OF CARE
Assumed care at 1900H, patient is intubated, on Precedex, Propofol and Dilaudid PCA. Patient very restless, sweating profusely, linens soaking wet. Patient given one dose of versed 2mg IVP to keep him sedated.  Profopol titrated to 50mcg/kg/minute, then at

## 2020-04-18 NOTE — PROGRESS NOTES
Assessment and Objective  INTERVENTIONS:  - Assess for changes in respiratory status  - Assess for changes in mentation and behavior  - Position to facilitate oxygenation and minimize respiratory effort  - Oxygen supplementation based on oxygen saturation Size: 7.5 mm  Cuffed: Cuffed  Insertion attempts: 1  Technique: Video laryngoscopy  Placement Verification: Auscultation;Capnometry  Placed By: Anesthesiologist   Secured at (cm) 22 cm   Measured From Lips   Secured Location Left   Secured by [employer]

## 2020-04-18 NOTE — PROGRESS NOTES
[facility]  Progress Note     Nelly Kelsey Patient Status:  Inpatient    1975 MRN [id number]   [facility] 4SW-A Attending Emir Morales MD   Hosp Day # 12 PCP Natty Petersen MD     Chief Complaint: resp failure    S: still very a Component Value Date    COVID19 Detected (AA) 03/31/2020       Pro-Calcitonin  No results for input(s): PCT in the last 168 hours. Cardiac  No results for input(s): TROP, PBNP in the last 168 hours.     Creatinine Kinase  No results for input(s): CK in help. Goal MAP 65  5. Viral PNA with presumptive Bacterial PNA/COVID+ve  1. complted azithro  2. Completed hydroxychloroquine (shortened regimen due to sig prolonging QTC)  3. S/p Actemra x1 4/2  4. Trending inflamm markers, stable  6. Low grade temps  1.

## 2020-04-18 NOTE — PROGRESS NOTES
[facility]                INFECTIOUS DISEASE PROGRESS NOTE (TELEMEDICINE NOTE)    Dairl Able Patient Status:  Inpatient    1975 MRN [id number]   [facility] 4SW-A Attending Tania Funk MD   [facility] Day # 12 PCP [place], [place] 28.0   ALKPHO 54 50 50   AST 64* 56* 39*   ALT 87* 78* 66*   BILT 1.6 1.4 1.1   TP 7.5 7.3 7.5       Vancomycin Trough (ug/mL)   Date Value   04/11/2020 16.3     Microbiology    Hospital Encounter on 04/02/20   1.  BLOOD CULTURE     Status: None (Preliminar

## 2020-04-18 NOTE — PROGRESS NOTES
Discussed patient with psych on-call, Dr. Jaquez Nurse via phone. Recommendations at this time to assist in weaning sedation is to decrease benzo dosing first and increase seroquel. Received first dose of 50mg seroquel from 25mg this morning.  Will increase to

## 2020-04-18 NOTE — PROGRESS NOTES
[facility]  Progress Note    Radha Day Patient Status:  Inpatient    1975 MRN [id number]   [facility] 4SW-A Attending Magda Black MD   Hosp Day # 12 PCP Oscar Cid MD     Subjective:  Radha Day is a(n) 41year old male.   R CREATSERUM 0.71 0.74 0.70   GFRAA 132 130 133   GFRNAA 114 112 115   CA 9.1 8.9 9.2   ALB 3.7 3.7 3.7    140 140   K 3.7 3.3* 3.6    107 106   CO2 27.0 27.0 28.0   ALKPHO 54 50 50   AST 64* 56* 39*   ALT 87* 78* 66*   BILT 1.6 1.4 1.1   TP 7. ECMO 4/14. · Continued diuresis per cardiology  · Completed course of meropenem. WBC improved, afebrile  · Wean sedation as able. Transitioning from IV to enteral dosing. To use prn IV rather than increasing infusion rate again.   Discussed in detail w

## 2020-04-18 NOTE — PLAN OF CARE
Pt orally intubated. Maintaining spo2 saturations on 40% fio2. Following simple commands. Delirium (+). Pt agitated/restless upon AM and evening assessment. Attempting to discontinue ETT. Propofol infusion titrated up.  Precedex infusion in place at [facility]

## 2020-04-18 NOTE — PROGRESS NOTES
Reviewed overnight course with nursing staff. Continues to be quite agitated when sedation weaned.     Remains on continuous IV precedex - propofol resumed - continuous versed and dilaudid weaned    7.36/49/175 - remains on 40% and PEEP 5    Tmax 99.9  102/

## 2020-04-19 ENCOUNTER — APPOINTMENT (OUTPATIENT)
Dept: GENERAL RADIOLOGY | Facility: HOSPITAL | Age: 45
DRG: 003 | End: 2020-04-19
Attending: INTERNAL MEDICINE
Payer: COMMERCIAL

## 2020-04-19 PROCEDURE — 99291 CRITICAL CARE FIRST HOUR: CPT | Performed by: INTERNAL MEDICINE

## 2020-04-19 PROCEDURE — 74018 RADEX ABDOMEN 1 VIEW: CPT | Performed by: INTERNAL MEDICINE

## 2020-04-19 PROCEDURE — 71045 X-RAY EXAM CHEST 1 VIEW: CPT | Performed by: INTERNAL MEDICINE

## 2020-04-19 PROCEDURE — 99232 SBSQ HOSP IP/OBS MODERATE 35: CPT | Performed by: HOSPITALIST

## 2020-04-19 RX ORDER — METHADONE HYDROCHLORIDE 10 MG/1
10 TABLET ORAL EVERY 4 HOURS
Status: DISCONTINUED | OUTPATIENT
Start: 2020-04-19 | End: 2020-04-20

## 2020-04-19 RX ORDER — SPIRONOLACTONE 25 MG/1
25 TABLET ORAL ONCE
Status: COMPLETED | OUTPATIENT
Start: 2020-04-19 | End: 2020-04-19

## 2020-04-19 RX ORDER — MAGNESIUM HYDROXIDE/ALUMINUM HYDROXICE/SIMETHICONE 120; 1200; 1200 MG/30ML; MG/30ML; MG/30ML
30 SUSPENSION ORAL 4 TIMES DAILY PRN
Status: DISCONTINUED | OUTPATIENT
Start: 2020-04-19 | End: 2020-05-05

## 2020-04-19 NOTE — PROGRESS NOTES
Nathan Shepherd is an 59 year old male.    Here today for left basilic transposition.  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  On home dialysis under care of dr perez.      Past Medical History:   Diagnosis Date   • Allergy    • Anemia 2/19/2015   • Anxiety    • Basal cell carcinoma    • BPH (benign prostatic hypertrophy)    • Bronchitis    • CAD (coronary artery disease)    • Cancer of skin    • Cancer of skin    • CKD (chronic kidney disease), stage V (CMS/HCC)    • Depression    • DM (diabetes mellitus) (CMS/HCC)    • Erectile dysfunction    • Fracture     bilateral wrist   • Gout    • Hyperlipidemia    • Hypertension    • Keratosis, actinic    • RENEE treated with BiPAP     severe on sleep study 2/2014/cpap   • Otitis media    • Pneumonia    • Renal hematoma, left 2/2015    s/p renal biopsy   • Type 2 diabetes mellitus (CMS/HCC)    • Urinary tract infection      Past Surgical History:   Procedure Laterality Date   • Arterio anastom/forearm vein Left 4/1/15   • Cardiac surgery     • Colonoscopy w biopsy  12/01/2017    Five Tubular Adenoma polyps - next exam due in 3 years- Dr Damon   • Coronary artery bypass graft  01/01/1997   • Laser of prostate w/ green light pvp  2004   • Mohs 1 stage upto5 tissue blocks hnfg N/A 12/05/2017    BCC left nose dorsum / sidewall   • Orif wrist fracture  1999    bilateral   • Proc lac repair layr clos wnd face,facial <1.0 cm  12/05/2017    mohs closure left nasal sidewall - Dr Leroy   • Ptca  5/2011   • Ptca with stent  07/07/2011   • Remove tonsils/adenoids,<14 y/o      Tonsillectomy w Adenoids (child)   • Skin biopsy     • Tonsillectomy     • Vascular surgery       Family History   Problem Relation Age of Onset   • High blood pressure Mother    • Cancer Father         throat cancer   • Cancer Sister         breast cancer   • Heart disease Brother    • Heart disease Brother      Social History   Substance Use Topics   • Smoking status: Former Smoker     Packs/day: 0.50     Pt is currently on VC+ 8/450/40%+5. No changes were made. Suctioning minimal amount of secretions.      Most Recent ABG:    Recent Labs   Lab 04/19/20  0733   ABGPHT 7.39   FKSEYQ8O 40   RONIG8V 166*   ABGHCO3 23.6   ABGBE -1.2   TEMP 98.6   BRIAN Positive  Years: 10.00     Types: Cigarettes     Quit date: 8/8/2014   • Smokeless tobacco: Never Used   • Alcohol use No      Comment: has not had alcohol since starting naltrexone       Prior to Admission Meds:  Prescriptions Prior to Admission   Medication Sig Dispense Refill   • calcium acetate gelcap (PHOSLO) 667 MG Cap Take 1,334 mg by mouth 3 times daily (with meals).     • tamsulosin (FLOMAX) 0.4 MG Cap Take 1 capsule by mouth daily. 90 capsule 1   • losartan (COZAAR) 100 MG tablet Take 1 tablet by mouth daily. 90 tablet 1   • metoPROLOL succinate (TOPROL-XL) 100 MG 24 hr tablet Take 1 tablet by mouth daily. 90 tablet 1   • B Complex-C-Folic Acid (DIALYVITE) tablet Take 1 tablet by mouth daily. 90 tablet 3   • nalTREXone (REVIA) 50 MG tablet Take 1 tablet by mouth daily. 90 tablet 0   • sertraline (ZOLOFT) 100 MG tablet Take 1.5 tablets by mouth daily. 135 tablet 1   • ketoconazole (NIZORAL) 2 % cream apply to face twice daily  x1-2 weeks then decrease to 2-3x/week 15 g 3   • hydroCORTisone (CORTIZONE) 2.5 % cream Apply to face bid x 1-2 weeks then decrease to 2-3/week 15 g 3   • cholecalciferol (VITAMIN D3) 1000 UNITS tablet Take 1,000 Units by mouth daily. Not sure of strength     • atorvastatin (LIPITOR) 80 MG tablet Take 1 tablet by mouth daily. 90 tablet 1   • Omega-3 Fatty Acids (FISH OIL) 1000 MG capsule Take 2 g by mouth 2 times daily.     • nitroGLYcerin (NITROSTAT) 0.4 MG SL tablet Place 0.4 mg under the tongue every 5 minutes as needed. Take for chest pain.       Scheduled Meds:  • sodium chloride (PF)  2 mL Injection 2 times per day     Continuous Infusions:  • sodium chloride 0.9% infusion       PRN Meds:lidocaine-prilocaine, lidocaine-sodium bicarbonate 0.9-8.4% **OR** lidocaine, metoPROLOL tartrate, dextrose, sodium chloride (PF)    Allergies:   ALLERGIES:   Allergen Reactions   • Niacin Other (See Comments)     FLUSHING AND BURNING SENSATION       Active Problems:    * No active hospital problems.  *    Blood pressure 95/61, pulse 58, temperature 97.5 °F (36.4 °C), temperature source Temporal Artery, resp. rate 18, height 5' 10\" (1.778 m), weight 122.9 kg, SpO2 98 %.    Review of Systems   All other systems reviewed and are negative.      Physical Exam   Constitutional: He appears well-developed and well-nourished.   Cardiovascular: Normal rate and regular rhythm.    Pulmonary/Chest: Effort normal.   Abdominal: Soft. Bowel sounds are normal.   Musculoskeletal: Normal range of motion.   Neurological: He is alert.   Skin: Skin is warm and dry.       Assessment:  Renal failure with no permanent access    Plan:  Plan left basilic transposition.   This is based on duplex which reveals large left basilic vein.   Pt advised that if vein is for some reason not suitable, and since left upper cephalic vein is absent,  Then I would insert a graft.        Dennis Mckeon MD  5/29/2018

## 2020-04-19 NOTE — PROGRESS NOTES
Reviewed this am with Dr. Yoana Blackmon.     Unfortunately still having difficulty weaning sedation - appreciate additional input from Dr. Ru Dennis - appears to be experiencing opioid and benzodiazepine withdrawal     Tmax 99.9   Blood pressures low the past 24 improve - appears physiologic at present - no need to suppress       CCT: 8:45 - 9:25 am        Addendum: ISAIAS reviewed - no evidence to suggest obstruction, perforation or toxic megacolon

## 2020-04-19 NOTE — PLAN OF CARE
Pt orally intubated. Withdraws from pain, does not follow commands. Maintaining spo2 saturations on 40% fio2. Tachypnea and tachycardia observed w/agitation. Delirium (+). B/p stable.  Pt diaphoretic, agitated, thrashing around in bed intermittently through

## 2020-04-19 NOTE — PLAN OF CARE
Rexeived patient sedated on vent. Will intermittently follow simple commands but mostly agitated and restless. Moves all extremities spontaneously. Continues on propofol, dilaudid and precedex drips with prn doses of versed and dilaudid boluses needed.  Sc techniques  - Monitor for opioid side effects  - Notify MD/LIP if interventions unsuccessful or patient reports new pain  - Anticipate increased pain with activity and pre-medicate as appropriate  Outcome: Progressing     Problem: SAFETY ADULT - FALL  Goal Electrolytes maintained within normal limits  Description  INTERVENTIONS:  - Monitor labs and rhythm and assess patient for signs and symptoms of electrolyte imbalances  - Administer electrolyte replacement as ordered  - Monitor response to electrolyte rep Problem: Delirium  Goal: Minimize duration of delirium  Description  Interventions:  - Encourage use of hearing aids, eye glasses  - Promote highest level of mobility daily  - Provide frequent reorientation  - Promote wakefulness i.e. lights on, blinds o

## 2020-04-19 NOTE — PROGRESS NOTES
04/19/20 0304   Vent Information   $ RT Standby Charge (per 15 min) 1   $ Vent Care / Non-Invasive Subsequent Day Yes   Ventilator Initiation 04/02/20   Ventilation Day(s) 16   Interface Invasive   Vent Type    Vent ID 9   Vent Mode VC+   Settings

## 2020-04-19 NOTE — PROGRESS NOTES
[facility]  Progress Note    Lopez Villalobos Patient Status:  Inpatient    1975 MRN [id number]   [facility] 4SW-A Attending Federico Ruiz MD   Hosp Day # 16 PCP Yordan Tinsley MD     Subjective:  Lopez Villalobos is a(n) 41year old male.   C 331.0 408.0 541.0*       Recent Labs   Lab 04/17/20  0446 04/18/20  0445 04/18/20  1422 04/19/20  0504   * 113*  --  108*   BUN 26* 25*  --  27*   CREATSERUM 0.74 0.70  --  0.82   GFRAA 130 133  --  124   GFRNAA 112 115  --  108   CA 8.9 9.2  --  9. metabolic acidosis likely related to diarrhea  · HTN, HLD  · GERD    Plan:  · Continue close monitoring of hemodynamics. Off pressor support.   · Completed COVID treatment regimen: zithromycin x5 days(started zpak 3/31), hydroxychloroquine x 4 days (stoppe

## 2020-04-20 ENCOUNTER — APPOINTMENT (OUTPATIENT)
Dept: GENERAL RADIOLOGY | Facility: HOSPITAL | Age: 45
DRG: 003 | End: 2020-04-20
Attending: INTERNAL MEDICINE
Payer: COMMERCIAL

## 2020-04-20 PROCEDURE — 99232 SBSQ HOSP IP/OBS MODERATE 35: CPT | Performed by: OTHER

## 2020-04-20 PROCEDURE — 99291 CRITICAL CARE FIRST HOUR: CPT | Performed by: INTERNAL MEDICINE

## 2020-04-20 PROCEDURE — 99232 SBSQ HOSP IP/OBS MODERATE 35: CPT | Performed by: HOSPITALIST

## 2020-04-20 PROCEDURE — 71045 X-RAY EXAM CHEST 1 VIEW: CPT | Performed by: INTERNAL MEDICINE

## 2020-04-20 RX ORDER — QUETIAPINE 100 MG/1
100 TABLET, FILM COATED ORAL 3 TIMES DAILY
Status: DISCONTINUED | OUTPATIENT
Start: 2020-04-20 | End: 2020-04-20

## 2020-04-20 RX ORDER — METHADONE HYDROCHLORIDE 10 MG/1
20 TABLET ORAL 3 TIMES DAILY
Status: DISCONTINUED | OUTPATIENT
Start: 2020-04-20 | End: 2020-04-25

## 2020-04-20 RX ORDER — LORAZEPAM 2 MG/ML
2 INJECTION INTRAMUSCULAR EVERY 6 HOURS PRN
Status: DISCONTINUED | OUTPATIENT
Start: 2020-04-20 | End: 2020-04-20

## 2020-04-20 RX ORDER — CHLORDIAZEPOXIDE HYDROCHLORIDE 25 MG/1
50 CAPSULE, GELATIN COATED ORAL 3 TIMES DAILY
Status: DISCONTINUED | OUTPATIENT
Start: 2020-04-20 | End: 2020-04-27

## 2020-04-20 RX ORDER — QUETIAPINE 100 MG/1
100 TABLET, FILM COATED ORAL EVERY 8 HOURS
Status: DISCONTINUED | OUTPATIENT
Start: 2020-04-20 | End: 2020-04-23

## 2020-04-20 RX ORDER — METHADONE HYDROCHLORIDE 10 MG/1
20 TABLET ORAL EVERY 8 HOURS SCHEDULED
Status: DISCONTINUED | OUTPATIENT
Start: 2020-04-20 | End: 2020-04-20

## 2020-04-20 RX ORDER — HALOPERIDOL 5 MG/ML
5 INJECTION INTRAMUSCULAR EVERY 6 HOURS PRN
Status: DISCONTINUED | OUTPATIENT
Start: 2020-04-20 | End: 2020-04-29

## 2020-04-20 RX ORDER — LORAZEPAM 2 MG/ML
2 INJECTION INTRAMUSCULAR EVERY 4 HOURS PRN
Status: DISCONTINUED | OUTPATIENT
Start: 2020-04-20 | End: 2020-04-27

## 2020-04-20 NOTE — PROGRESS NOTES
Seen and examined early this am. Reviewed with Dr. Romel Smalls at the bedside. Discussed with Dr. June Kaplan. Very restless - occasionally opens eyes yet not purposeful or responding appropriately.     Afebrile when I saw him but 100.5 later in am    120 regular

## 2020-04-20 NOTE — PROGRESS NOTES
[facility]  Report of Psychiatric Progress Note    Lopez Villalobos Patient Status:  Inpatient    1975 MRN [id number]   [facility] 4SW-A Attending Bryce Reyes MD   [facility] Day # 25 PCP Yordan Tinsley MD     Date of Admission: 20  Date Performed by Micheal Starr MD at [address] [place] [address]  2007   • SKIN SURGERY      lipoma removal right shoulder area and under left breast     Family History   Problem Relation Age of Onset   • Hypertension Father    • Other (stent placement) (VERSED) 100 mg in sodium chloride 0.9% 100 mL infusion, 0.02-0.5 mg/kg/hr (Dosing Weight), Intravenous, Continuous  •  Insulin Aspart Pen (NOVOLOG) 100 UNIT/ML flexpen 4-20 Units, 4-20 Units, Subcutaneous, 4 times per day  •  glucose (DEX4) oral liquid 15 injection 10 mL, 10 mL, Intravenous, PRN  •  ondansetron HCl (ZOFRAN) injection 4 mg, 4 mg, Intravenous, Q6H PRN  •  Metoclopramide HCl (REGLAN) injection 5 mg, 5 mg, Intravenous, Q8H PRN    Review of Systems   Unable to perform ROS: Intubated     Mental S

## 2020-04-20 NOTE — PROGRESS NOTES
[facility]  Progress Note     [facility] Patient Status:  Inpatient    1975 MRN [id number]   [facility] 4SW-A Attending Balbina Mcintosh MD   Hosp Day # 25 PCP Ivone Guerrero MD     Chief Complaint: resp failure    S: still very a input(s): CK in the last 168 hours. Inflammatory Markers  Recent Labs   Lab 04/17/20  0446 04/18/20  0445 04/19/20  0504 04/20/20  0439   CRP 0.63* 0.73* 1.17* 2.34*   ANJEL 1,371.8* 1,271.6* 1,273.9* 1,306.9*   * 627* 582* 1,576*   DDIMER 2.96* 2. completed  2. Vancomycin started 4/10 empirically as well, now held, ID following  3. Cultures re-drawn 4/10 and also negative  7. Leukocytosis has normalized  8. Prolonged QTC  1. Monitor  2. Minimize any potentially offending drugs  9.  Hypernatremia - im

## 2020-04-20 NOTE — PLAN OF CARE
Patient received intubated, sedated with propofol and precedex. Patient opening eyes spontaneously but unable to follow commands. Unable to track this RN in the room. Dr. Erika Luo at bedside to evaluate today.  Scheduled seroquel, methadone and librim TID with

## 2020-04-20 NOTE — PROGRESS NOTES
[facility]  Progress Note     Lendon Messing Patient Status:  Inpatient    1975 MRN [id number]   [facility] 4SW-A Attending Brennan Deras MD   Hosp Day # 16 PCP Guillermo Lange MD     Chief Complaint: resp failure    S: still very a 04/18/20  0445 04/19/20  0504   CRP 0.63* 0.73* 1.17*   ANJEL 1,371.8* 1,271.6* 1,273.9*   * 627* 582*   DDIMER 2.96* 2.40* 2.45*       Imaging: Imaging data reviewed in Epic.     Medications:   • Methadone HCl  10 mg Per NG Tube Q4H   • fenofibrate mi also negative  7. Leukocytosis has normalized  8. Prolonged QTC  1. Monitor  2. Minimize any potentially offending drugs  9. Hypernatremia - imrpved  1. , FWF, monitor  10. HTN  11. DL  12. 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  Transitioning

## 2020-04-20 NOTE — PAYOR COMM NOTE
--------------  CONTINUED STAY REVIEW    Payor: [address] #:  [id number]  Authorization Number: [id number]      4/19 PULM    Subjective:  Lala Jeff is a(n) 41year old male. Continues to have agitation and confusion. 04/18/20  0445 04/18/20  1422 04/19/20  0504   * 113*  --  108*   BUN 26* 25*  --  27*   CREATSERUM 0.74 0.70  --  0.82   GFRAA 130 133  --  124   GFRNAA 112 115  --  108   CA 8.9 9.2  --  9.5   ALB 3.7 3.7  --  4.2    140  --  139   K 3.3* 3. and APN again. Appreciate psych input. Managing withdrawal.  Start methadone. Off versed and ketamine gtts. Decrease propofol due to elevated triglycerides. · Barrier to extubation remains neuro status.   May need to consider weaning trial and attempt trach  6. Adjust sedation. Try to wean dilaudid. 7. D/w pulm, agree w/ methadone  8. Psych Monday to help w agitation  3. Elevated Troponin with EKG changes  1.  ECHO shows no RWMA with hyperdynamic EF, no further cardiac workup planned  2. 4/13 repeat EC 40 mEq)         Volume (mL) (Albumin Human (ALBUMINAR) 25 % solution 25 g)       Shift Total 978.6 1149.2 2127.8   O  U  T  P  U  T Urine 7513 601 4988      Output (mL) (Urethral Catheter) 4165 239 9329    Stool         Stool Count Calculated for I/O 2 x (LOVENOX) 80 MG/0.8ML injection 70 mg     Date Action Dose Route User    4/19/2020 2000 Given 70 mg Subcutaneous (Left Lower Abdomen) Rosales Barillas RN    4/19/2020 9452 Given 70 mg Subcutaneous (Right Lower Abdomen) Dorothy Gregory, RN      fenofibrate from Bag 0.5 mg Intravenous Octavio Telephone, ELIJAH    4/19/2020 0847 Bolus from Bag 0.5 mg Intravenous Octavio Krysten, RN    4/19/2020 0818 Bolus from Bag 1 mg Intravenous Octavio Krysten, RN      LORazepam (INTENSOL) 2 MG/ML ORAL concentrated solution 2 mg Intravenous Betty Fillers, RN    4/19/2020 3666 Given 1 mg Intravenous Betty Fillers, RN    4/19/2020 8886 Given 1 mg Intravenous Betty Fillers, RN      norepinephrine (LEVOPHED) 4 mg/250 ml premix infusion     Date Action Dose Route User    4/19/2020 2 Sanz Ring    4/19/2020 1916 Given 100 mg Per NG Tube Parveen Villaseñor, RN      [facility]) syrup 8.8 mg     Date Action Dose Route User    4/19/2020 2504 Given 8.8 mg Oral Parveen Villaseñor RN      spironolactone (ALDACTONE) tab 25 mg     Date Action Wilmer Ignacio

## 2020-04-20 NOTE — PLAN OF CARE
Received patient after report. Patient resting in bed intubated on ventilator. Minimal vent settings, tolerated well. Patient unresponsive to verbal and physical stimuli at start of shift. Dilaudid stopped. No increase in LOC.  Patient became hypotensive, s

## 2020-04-20 NOTE — PROGRESS NOTES
[facility]  Progress Note    Juan J Segal Patient Status:  Inpatient    1975 MRN [id number]   [facility] 4SW-A Attending Susana Lee MD   Hosp Day # 25 PCP Bharat Yang MD     Subjective:  Juan J Segal is a(n) 41year old male.   Payton Broderick 31.4* 29.8*   .0 541.0* 505.0*       Recent Labs   Lab 04/18/20  0445 04/18/20  1422 04/19/20  0504 04/20/20  0439 04/20/20  0626   *  --  108* 115*  --    BUN 25*  --  27* 24*  --    CREATSERUM 0.70  --  0.82 0.77  --    GFRAA 133  --  124 1 improving  · Hypertriglyceridemia- due to propofol, improved, monitoring  · Diarrhea likely viral infection related; improved  · nonanion gap metabolic acidosis likely related to diarrhea  · HTN, HLD  · GERD    Plan:  · Wean sedation as able.   [facility]

## 2020-04-20 NOTE — DIETARY NOTE
[facility]    NUTRITION ASSESSMENT    Pt does not meet malnutrition criteria.     NUTRITION DIAGNOSIS/PROBLEM:  Inadequate oral intake related to physiological causes as evidenced by vent status, intubated, sedated, prone. -ongoing    NUTRITION INTERVE COVID19, intubated yesterday for acute hypoxic respiratory failure. Currently on vent, FiO2 60%, PEEP 15. Pt sedated on propofol, low TF rate at this time. Plan to permissive underfeeding via enteral nutrition given high respiratory needs.  Will continue to

## 2020-04-21 ENCOUNTER — APPOINTMENT (OUTPATIENT)
Dept: GENERAL RADIOLOGY | Facility: HOSPITAL | Age: 45
DRG: 003 | End: 2020-04-21
Attending: INTERNAL MEDICINE
Payer: COMMERCIAL

## 2020-04-21 PROCEDURE — 71045 X-RAY EXAM CHEST 1 VIEW: CPT | Performed by: INTERNAL MEDICINE

## 2020-04-21 PROCEDURE — 99291 CRITICAL CARE FIRST HOUR: CPT | Performed by: INTERNAL MEDICINE

## 2020-04-21 PROCEDURE — 99232 SBSQ HOSP IP/OBS MODERATE 35: CPT | Performed by: OTHER

## 2020-04-21 PROCEDURE — 99232 SBSQ HOSP IP/OBS MODERATE 35: CPT | Performed by: HOSPITALIST

## 2020-04-21 NOTE — PROGRESS NOTES
[facility]                INFECTIOUS DISEASE PROGRESS NOTE    Nain Cox Patient Status:  Inpatient    1975 MRN [id number]   [facility] 4SW-A Attending Franchesca Randall MD   Hosp Day # 25 PCP Kelley Chopra MD     Antibiotics co 11:33 PM   Result Value Ref Range    Blood Culture Result No Growth 5 Days N/A         Radiology    cxr 4/8 reviewed    Problem list reviewed:  Patient Active Problem List:     Hyperlipidemia     Benign essential HTN     Impaired fasting glucose     Pneumo

## 2020-04-21 NOTE — PLAN OF CARE
Pt received at 0730, Intubated, sedated, Agitated and restless. Pt moving legs freely. No response to verbal command, minimal response to pain to BLE. PERRL. TF VITAL AF @ 40ml/hr.  Plan to wean sedation in lue of PO meds for withdrawal.  1200 - Precedex gr

## 2020-04-21 NOTE — PROGRESS NOTES
[facility]                INFECTIOUS DISEASE PROGRESS NOTE    Nelly Kelsey Patient Status:  Inpatient    1975 MRN [id number]   [facility] 4SW-A Attending Emir Morales MD   Hosp Day # 23 PCP Natty Petersen MD     Antibiotics co Radiology    cxr 4/8 reviewed    Problem list reviewed:  Patient Active Problem List:     Hyperlipidemia     Benign essential HTN     Impaired fasting glucose     Pneumonia due to 2019 novel coronavirus     Acute hypoxemic respiratory failure ([facility] [address]

## 2020-04-21 NOTE — PROGRESS NOTES
2000- received pt intubated, sedated. Pt tachy in the 130's. Pt constantly kicking legs. Pt does not follow commands. Pt did attempt to open eyes to verbal stimuli. Propofol and precedex infusing as ordered. Haldol prn given. Pt tolerating vent.  No apparen

## 2020-04-21 NOTE — PROGRESS NOTES
[facility]  Progress Note    Manpreet Alves Patient Status:  Inpatient    1975 MRN [id number]   [facility] 4SW-A Attending Mercy Bolaños MD   [address] Day # 23 PCP Braden Nolasco MD     Subjective:  Manpreet Alves is a(n) 41year old male.   O BUN 27* 24*  --  17   CREATSERUM 0.82 0.77  --  0.69*   GFRAA 124 128  --  133   GFRNAA 108 110  --  115   CA 9.5 9.4  --  9.0   ALB 4.2 3.8  --  3.6    135*  --  138   K 4.0  --  4.7 3.7    105  --  106   CO2 28.0 25.0  --  26.0   ALKPHO 52 able.  Off all diaudid. On scheduled methadone, librium and seroquel. PRN ativan and haldol. Daily EKGs. · Barrier to extubation remains neuro status.   May need to consider weaning trial and attempt at extubation if otherwise meets criteria  · On mini

## 2020-04-21 NOTE — PROGRESS NOTES
Reviewed overnight course with nursing staff.  Unfortunately does not appear to be making progress in weaning sedation - still very agitated with subsequent tachycardia    Blood pressures low overnight with sedation - increased this am with agitation - on P
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
## 2020-04-21 NOTE — RESPIRATORY THERAPY NOTE
Pt remains intubated and on vent vc+ 8 450 40% +5 mdi tid 8 puffs alb christiano well wean as christiano

## 2020-04-21 NOTE — VASCULAR ACCESS
Vascular note:  Chest xray 4/21/2020 indicates PICC uncoiled and in the SVC. No adjustments to PICC required.   Vascular consult canceled

## 2020-04-22 ENCOUNTER — APPOINTMENT (OUTPATIENT)
Dept: ULTRASOUND IMAGING | Facility: HOSPITAL | Age: 45
DRG: 003 | End: 2020-04-22
Attending: INTERNAL MEDICINE
Payer: COMMERCIAL

## 2020-04-22 ENCOUNTER — APPOINTMENT (OUTPATIENT)
Dept: GENERAL RADIOLOGY | Facility: HOSPITAL | Age: 45
DRG: 003 | End: 2020-04-22
Attending: INTERNAL MEDICINE
Payer: COMMERCIAL

## 2020-04-22 PROCEDURE — 99232 SBSQ HOSP IP/OBS MODERATE 35: CPT | Performed by: HOSPITALIST

## 2020-04-22 PROCEDURE — 93970 EXTREMITY STUDY: CPT | Performed by: INTERNAL MEDICINE

## 2020-04-22 PROCEDURE — 99232 SBSQ HOSP IP/OBS MODERATE 35: CPT | Performed by: OTHER

## 2020-04-22 PROCEDURE — 99291 CRITICAL CARE FIRST HOUR: CPT | Performed by: INTERNAL MEDICINE

## 2020-04-22 PROCEDURE — 71045 X-RAY EXAM CHEST 1 VIEW: CPT | Performed by: INTERNAL MEDICINE

## 2020-04-22 RX ORDER — ENOXAPARIN SODIUM 100 MG/ML
0.5 INJECTION SUBCUTANEOUS EVERY 12 HOURS SCHEDULED
Status: DISCONTINUED | OUTPATIENT
Start: 2020-04-23 | End: 2020-05-05

## 2020-04-22 RX ORDER — POTASSIUM CHLORIDE 29.8 MG/ML
40 INJECTION INTRAVENOUS ONCE
Status: COMPLETED | OUTPATIENT
Start: 2020-04-22 | End: 2020-04-22

## 2020-04-22 RX ORDER — POTASSIUM CHLORIDE 29.8 MG/ML
40 INJECTION INTRAVENOUS ONCE
Status: DISCONTINUED | OUTPATIENT
Start: 2020-04-22 | End: 2020-04-22

## 2020-04-22 NOTE — PROGRESS NOTES
[facility]  Report of Psychiatric Progress Note    Connie Gutierrez Patient Status:  Inpatient    1975 MRN [id number]   [facility] 4SW-A Attending Hyacinth Muñiz MD   Hosp Day # 21 PCP Daniella Mosley MD     Date of Admission: 20  Date has received 20mg IV Haldol and 12mg IV Ativan the past 24 hrs. He is off Propofol this AM. Remains on precedex and the scheduled Seroquel/Methadone/Librium. He is tracking with his eyes now. He continues to kick with his legs. No UE movements.      4/21/20 Tube, TID  •  LORazepam (ATIVAN) injection 2 mg, 2 mg, Intravenous, Q4H PRN  •  QUEtiapine Fumarate (SEROQUEL) tab 100 mg, 100 mg, Per NG Tube, Q8H  •  Alum & Mag Hydroxide-Simeth (MAALOX) oral suspension 30 mL, 30 mL, Oral, QID PRN  •  fenofibrate microni (TYLENOL) tab 650 mg, 650 mg, Oral, Q6H PRN **OR** acetaminophen (TYLENOL) 160 MG/5ML oral liquid 650 mg, 650 mg, Oral, Q6H PRN **OR** acetaminophen (TYLENOL) 650 MG rectal suppository 650 mg, 650 mg, Rectal, Q6H PRN  •  PEG 3350 (MIRALAX) powder packet 17 04/22/2020    CRP 5.22 04/22/2020    PGLU 133 04/22/2020

## 2020-04-22 NOTE — PLAN OF CARE
Pt received this am 0730 on ventilator 8/450/5/0.40. resting comfortably. Sedation vacation this morning for breathing trial. Pt tolerated well but mentation and is only to follow commands for brief moments.   Sedating patient difficult after, tachy 130s-14

## 2020-04-22 NOTE — PROGRESS NOTES
[facility]  Progress Note    Marsha Cobb Patient Status:  Inpatient    1975 MRN [id number]   [facility] 4SW-A Attending Odalis Marion MD   Hosp Day # 21 PCP Johny Chin MD     Subjective/ Interval hx:  Marsha Cobb is a(n) 41 year Lab 04/20/20  0439 04/21/20  0501 04/22/20  0448   WBC 5.9 5.4 7.5   HGB 10.3* 10.2* 9.3*   HCT 29.8* 30.5* 27.2*   .0* 448.0 451.0*       Recent Labs   Lab 04/20/20  0439 04/20/20  0626 04/21/20  0458 04/22/20  0425   *  --  120* 123*   BU mEq Intravenous Once   • chlordiazePOXIDE HCl  50 mg Per NG Tube TID   • Methadone HCl  20 mg Per NG Tube TID   • QUEtiapine Fumarate  100 mg Per NG Tube Q8H   • fenofibrate micronized  134 mg Oral Daily   • Albuterol Sulfate HFA  4 puff Inhalation TID   • wean norepi for goal  · Completed COVID treatment regimen: zithromycin x5 days(started zpak 3/31), hydroxychloroquine x 4 days (stopped 4/5 due to worsening prolonged QT); given tocilizumab x1 dose, 4/2  · Monitor COVID labs/ inflammatory markers; continue

## 2020-04-22 NOTE — CM/SW NOTE
Care Progression Note:  Active Acute Medical Issue:   40 y/o male with acute respiratory failure/ARDS due to COVID pneumonia,continued on full vent support with sedation vacation earlier today, but back on; SBT as tolerated; vent day #19, barrier to extuba

## 2020-04-22 NOTE — RESPIRATORY THERAPY NOTE
Received pt vented, VC+ 8/450/40%/+5, tolerating well. ETCO2 remains in mid 40's. TID Albuterol MDI. No changes made. Will continue to monitor closely.

## 2020-04-22 NOTE — PROGRESS NOTES
[facility]  Progress Note     Roni Alcala Patient Status:  Inpatient    1975 MRN [id number]   [facility] 4SW-A Attending Roswell Sicard, MD   Hosp Day # 23 PCP Selene Carlos MD     Chief Complaint: resp failure    S: still very a 04/19/20  0504 04/20/20  0439 04/21/20  0458   CRP 0.73* 1.17* 2.34* 2.57*   ANJEL 1,271.6* 1,273.9* 1,306.9* 1,173.5*   * 582* 1,576* 441*   DDIMER 2.40* 2.45*  --  1.16*       Imaging: Imaging data reviewed in Epic.     Medications:   • chlordiazePOX re-drawn 4/10 and also negative  7. Leukocytosis has normalized  8. Prolonged QTC  1. Monitor  2. Minimize any potentially offending drugs  9. Hypernatremia - imrpved  1. , FWF, monitor  10. HTN  11. DL  12. GERD  13.  Presumptive Thrombotic Microangiopathy

## 2020-04-22 NOTE — PROGRESS NOTES
Reviewed with nursing staff at bedside - still thrashing about intermittently yet some progress made in weaning sedation and some hopeful tracking and squeezing hand to commands.     Afebrile BP's fluctuate with levels of sedation/agitation    Currently Pro

## 2020-04-22 NOTE — RESPIRATORY THERAPY NOTE
Received pt on VC+ 8/450/40%/+5. Suctioned large/yellow/thick secretions via ETT. Sputum culture collected. Sample sent to Lab. Continue MDI Tx TID. No change made at this time. Will monitor pt closely.

## 2020-04-22 NOTE — PLAN OF CARE
Assumed care of patient at 7pm  Patient sedated; SBP in [de-identified]'  patient became agitated during assessment; restless; not tolerating vent; biting tube; '; '  PRN ativan and haldol given  sedation increased; precedex restarted  Large BM    Germania car

## 2020-04-23 ENCOUNTER — APPOINTMENT (OUTPATIENT)
Dept: GENERAL RADIOLOGY | Facility: HOSPITAL | Age: 45
DRG: 003 | End: 2020-04-23
Attending: INTERNAL MEDICINE
Payer: COMMERCIAL

## 2020-04-23 PROCEDURE — 99232 SBSQ HOSP IP/OBS MODERATE 35: CPT | Performed by: HOSPITALIST

## 2020-04-23 PROCEDURE — 71045 X-RAY EXAM CHEST 1 VIEW: CPT | Performed by: INTERNAL MEDICINE

## 2020-04-23 PROCEDURE — 99291 CRITICAL CARE FIRST HOUR: CPT | Performed by: INTERNAL MEDICINE

## 2020-04-23 PROCEDURE — 99232 SBSQ HOSP IP/OBS MODERATE 35: CPT | Performed by: OTHER

## 2020-04-23 RX ORDER — QUETIAPINE 25 MG/1
50 TABLET, FILM COATED ORAL EVERY 6 HOURS SCHEDULED
Status: DISCONTINUED | OUTPATIENT
Start: 2020-04-23 | End: 2020-04-24

## 2020-04-23 RX ORDER — FUROSEMIDE 10 MG/ML
40 INJECTION INTRAMUSCULAR; INTRAVENOUS ONCE
Status: COMPLETED | OUTPATIENT
Start: 2020-04-23 | End: 2020-04-23

## 2020-04-23 RX ORDER — VANCOMYCIN/0.9 % SOD CHLORIDE 1.75 G/5
25 PLASTIC BAG, INJECTION (ML) INTRAVENOUS ONCE
Status: COMPLETED | OUTPATIENT
Start: 2020-04-23 | End: 2020-04-23

## 2020-04-23 RX ORDER — QUETIAPINE 100 MG/1
100 TABLET, FILM COATED ORAL EVERY 8 HOURS
Status: DISCONTINUED | OUTPATIENT
Start: 2020-04-23 | End: 2020-04-23

## 2020-04-23 NOTE — PAYOR COMM NOTE
--------------  CONTINUED STAY REVIEW    Payor: [address] #:  [id number]  Authorization Number: [id number]    4/22 PULM    Assessment:  · Acute hypoxic respiratory failure, acute respiratory distress syndrome due to CO intermediate dosing  · Transfuse for hgb <7  · Ppx: SCDs, H2B, LMWH  · Dispo: ICU           4/22 HOSP    S: Patient intubated. Pt aggitiated when weaned off sedation.      Review of Systems:   still very agitated, unable to wean. Non following cmds.  Off di gerda  2. Completed hydroxychloroquine (shortened regimen due to sig prolonging QTC)  3. S/p Actemra x1 4/2  4. Trending inflamm markers, stable  6. Low grade temps  1. ceft -> zosyn on 4/2 -> Meropenem started 4/10. completed  2.  Vancomycin started 4/10 MEDICATIONS ADMINISTERED IN LAST 1 DAY:  acetaminophen (TYLENOL) 160 MG/5ML oral liquid 650 mg     Date Action Dose Route User    4/23/2020 7045 Given 650 mg Oral Eddi Syed RN    4/22/2020 1448 Given 650 mg Oral Aliyah Moe, RN    4/23/2020 0021 Rate/Dose Change 0.2 mcg/kg/hr × 75 kg (Dosing Weight) Intravenous Felicita Ortega RN    4/22/2020 2244 Restarted 0.3 mcg/kg/hr × 75 kg (Dosing Weight) Intravenous Felicita Ortega RN    4/22/2020 2211 Rate/Dose Change 0.3 mcg/kg/ Subcutaneous (Left Lower Abdomen) Sundeep Ellison RN      LORazepam (ATIVAN) injection 2 mg     Date Action Dose Route User    4/23/2020 0356 Given 2 mg Intravenous Sanjuana Vidal RN    4/22/2020 1230 Given 2 mg Intravenous Sundeep Ellison RN    4/22/202 4/23/2020 0502 Rate/Dose Change 40 mcg/kg/min × 75 kg (Dosing Weight) Intravenous Roland Polo RN    4/23/2020 0435 Rate/Dose Change 50 mcg/kg/min × 75 kg (Dosing Weight) Intravenous Roland Polo RN    4/23/2020 0345 New Bag 40 mcg/kg/min × 75

## 2020-04-23 NOTE — PROGRESS NOTES
04/23/20 0314   Vent Information   $ RT Standby Charge (per 15 min) 1   $ Vent Care / Non-Invasive Subsequent Day Yes   Ventilator Initiation 04/02/20   Ventilation Day(s) 22   Interface Invasive   Vent Type    Vent ID 9   Vent Mode VC+   Settings

## 2020-04-23 NOTE — PROGRESS NOTES
[facility]  Report of Psychiatric Progress Note    Cynthianafeli Alves Patient Status:  Inpatient    1975 MRN [id number]   [facility] 4SW-A Attending Aggie Mackenzie MD   [facility] Day # 21 PCP Braden Nolasco MD     Date of Admission: 20  Date tracking with his eyes as well.    4/21/20  Over the past 24 hrs, he has received 12mg IV Ativan and 20mg IV Haldol and the scheduled Seroquel 100mg TID, Methadone 20mg TID, and Librium 50mg TID. Still has intermittent agitation or LE's (ie kicking legs). (LIBRIUM) cap 50 mg, 50 mg, Per NG Tube, TID  •  Methadone HCl (DOLOPHINE) tab 20 mg, 20 mg, Per NG Tube, TID  •  LORazepam (ATIVAN) injection 2 mg, 2 mg, Intravenous, Q4H PRN  •  Alum & Mag Hydroxide-Simeth (MAALOX) oral suspension 30 mL, 30 mL, Oral, QID PRN  •  docusate sodium (COLACE) liquid 100 mg, 100 mg, Oral, BID  •  Chlorhexidine Gluconate (PERIDEX) 0.12 % solution 15 mL, 15 mL, Mouth/Throat, [email or web address]  •  Normal Saline Flush 0.9 % injection 10 mL, 10 mL, Intravenous, PRN  •  ondansetron HCl (ZO

## 2020-04-23 NOTE — PROGRESS NOTES
Reviewed with nursing staff early this am and Dr. Zahra Ramos later in the morning.     Ongoing troubles with agitation and withdrawal from centrally acting agents    Tmax 100.6  Heart rates and Blood pressures continue to fluctuate depending upon the patient's

## 2020-04-23 NOTE — PROGRESS NOTES
[facility]  Progress Note    Jett Murguia Patient Status:  Inpatient    1975 MRN [id number]   [facility] 4SW-A Attending Araceli Rosales MD   [facility] Day # 21 PCP Sina Valenzuela MD     Subjective/ Interval hx:  Jett Murguia is a(n) 41 year 8.6*   HCT 30.5* 27.2* 26.2*   .0 451.0* 410.0       Recent Labs   Lab 04/21/20  0458 04/22/20  0425 04/23/20 0427   * 123* 133*   BUN 17 16 11   CREATSERUM 0.69* 0.70 0.58*   GFRAA 133 133 143   GFRNAA 115 115 124   CA 9.0 8.2* 8.1*   ALB 3 3350  17 g Oral Daily   • Senna  5 mL Oral BID   • Insulin Aspart Pen  4-20 Units Subcutaneous 4 times per day   • pantoprazole (PROTONIX) IV push  40 mg Intravenous Q12H   • artificial tears   Both Eyes BID   • docusate sodium  100 mg Oral BID   • Chlorhe QTc  · Continue close monitoring of hemodynamics, goal MAP >65, wean norepi for goal  · Completed COVID treatment regimen: zithromycin x5 days(started zpak 3/31), hydroxychloroquine x 4 days (stopped 4/5 due to worsening prolonged QT); given tocilizumab x1

## 2020-04-23 NOTE — PLAN OF CARE
Pt received this AM 0730 on ventilator resting comfortable in bed. Tolerating vent well 8/450/5/.40. Plan for sedation wean this AM for Spontaneous awakening trial, breathing trial for evaluation for possible extubation.

## 2020-04-23 NOTE — PROGRESS NOTES
[facility]  Progress Note     Mello Joe Patient Status:  Inpatient    1975 MRN [id number]   [facility] 4SW-A [facility] PCP Mathieu Silva MD     Chief Complaint: SOB    S: Patient intuba enoxaparin  0.5 mg/kg Subcutaneous 2 times per day   • chlordiazePOXIDE HCl  50 mg Per NG Tube TID   • Methadone HCl  20 mg Per NG Tube TID   • fenofibrate micronized  134 mg Oral Daily   • Albuterol Sulfate HFA  4 puff Inhalation TID   • PEG 3350  17 g Or monitor  11. HTN  12. DL  13. GERD  14. Presumptive Thrombotic Microangiopathy  1. Transitioning from heparin gtt to Full dose lovenox 4/14  15. Anemia/Thrombocytopenia  1. Monitor CBC closely  16. 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 Off propofol  17.  Ashley Capps

## 2020-04-23 NOTE — PLAN OF CARE
Assumed care of patient at 7pm;   Patient sedated; does bite on suction catheter during oral care at 8pm  Received evening medication  At 10:30 patient does not gag during oral care ; pupils pin point; unresponsive  Sedation stopped; slowly starting to res management  - Manage/alleviate anxiety  - Utilize distraction and/or relaxation techniques  - Monitor for opioid side effects  - Notify MD/LIP if interventions unsuccessful or patient reports new pain  - Anticipate increased pain with activity and pre-medi

## 2020-04-23 NOTE — PROGRESS NOTES
[facility]  Progress Note     Jamar Riley Patient Status:  Inpatient    1975 MRN [id number]   [facility] 4SW-A Attending Mendoza Cm [facility] Day # 21 PCP Sailaja Mcleod MD     Chief Complaint: SOB    S: Patient intuba 50 mg Per NG Tube TID   • Methadone HCl  20 mg Per NG Tube TID   • QUEtiapine Fumarate  100 mg Per NG Tube Q8H   • fenofibrate micronized  134 mg Oral Daily   • Albuterol Sulfate HFA  4 puff Inhalation TID   • PEG 3350  17 g Oral Daily   • Senna  5 mL Oral 4/14  14. Anemia/Thrombocytopenia  1. Monitor CBC closely  15. 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 Off propofol  16. Malnutrition  17. High residual TF  1.  Resolved after large BM's after relistor  2. TF resumed       Plan of care: As above    Quality:

## 2020-04-23 NOTE — PROGRESS NOTES
[facility] Dosing Service    Initial Pharmacokinetic Consult for Vancomycin Dosing     Denita Carney is a 41year old male who is being treated for Covid . Patient has been febrile with worsening CXR and concern for VAP.  Pharmacy has been asked to dose Vanc

## 2020-04-24 ENCOUNTER — APPOINTMENT (OUTPATIENT)
Dept: GENERAL RADIOLOGY | Facility: HOSPITAL | Age: 45
DRG: 003 | End: 2020-04-24
Attending: INTERNAL MEDICINE
Payer: COMMERCIAL

## 2020-04-24 PROBLEM — D64.9 ANEMIA, UNSPECIFIED: Status: ACTIVE | Noted: 2020-04-24

## 2020-04-24 PROCEDURE — 99232 SBSQ HOSP IP/OBS MODERATE 35: CPT | Performed by: OTHER

## 2020-04-24 PROCEDURE — 99291 CRITICAL CARE FIRST HOUR: CPT | Performed by: INTERNAL MEDICINE

## 2020-04-24 PROCEDURE — 99232 SBSQ HOSP IP/OBS MODERATE 35: CPT | Performed by: HOSPITALIST

## 2020-04-24 PROCEDURE — 99232 SBSQ HOSP IP/OBS MODERATE 35: CPT | Performed by: INTERNAL MEDICINE

## 2020-04-24 PROCEDURE — 71045 X-RAY EXAM CHEST 1 VIEW: CPT | Performed by: INTERNAL MEDICINE

## 2020-04-24 RX ORDER — FUROSEMIDE 10 MG/ML
40 INJECTION INTRAMUSCULAR; INTRAVENOUS ONCE
Status: DISCONTINUED | OUTPATIENT
Start: 2020-04-24 | End: 2020-04-24

## 2020-04-24 RX ORDER — MAGNESIUM SULFATE HEPTAHYDRATE 40 MG/ML
2 INJECTION, SOLUTION INTRAVENOUS ONCE
Status: COMPLETED | OUTPATIENT
Start: 2020-04-24 | End: 2020-04-24

## 2020-04-24 RX ORDER — POTASSIUM CHLORIDE 1.5 G/1.77G
40 POWDER, FOR SOLUTION ORAL EVERY 4 HOURS
Status: COMPLETED | OUTPATIENT
Start: 2020-04-24 | End: 2020-04-24

## 2020-04-24 RX ORDER — QUETIAPINE 25 MG/1
25 TABLET, FILM COATED ORAL EVERY 6 HOURS SCHEDULED
Status: DISCONTINUED | OUTPATIENT
Start: 2020-04-24 | End: 2020-04-26

## 2020-04-24 RX ORDER — FUROSEMIDE 10 MG/ML
40 INJECTION INTRAMUSCULAR; INTRAVENOUS ONCE
Status: COMPLETED | OUTPATIENT
Start: 2020-04-24 | End: 2020-04-24

## 2020-04-24 RX ORDER — SPIRONOLACTONE 25 MG/1
25 TABLET ORAL
Status: DISCONTINUED | OUTPATIENT
Start: 2020-04-24 | End: 2020-04-29

## 2020-04-24 NOTE — PROGRESS NOTES
[facility]  Progress Note     Nelly Laure Patient Status:  Inpatient    1975 MRN [id number]   [facility] 4SW-A Attending [facility] Day # 25 PCP Natty Petersen MD     Chief Complaint: SOB    S: Patient intuba Subcutaneous 2 times per day   • chlordiazePOXIDE HCl  50 mg Per NG Tube TID   • Methadone HCl  20 mg Per NG Tube TID   • fenofibrate micronized  134 mg Oral Daily   • Albuterol Sulfate HFA  4 puff Inhalation TID   • PEG 3350  17 g Oral Daily   • Senna  5 offending drugs  10. Hypernatremia - imrpved  1. , [facility], monitor  11. HTN  12. DL  13. GERD  14. Presumptive Thrombotic Microangiopathy  1. Transitioning from heparin gtt to Full dose lovenox 4/14  15. Anemia/Thrombocytopenia  1. Monitor CBC closely  16.  Hy

## 2020-04-24 NOTE — PROGRESS NOTES
[facility]  Report of Psychiatric Progress Note    Gilberto Canas Patient Status:  Inpatient    1975 MRN [id number]   [facility] 4SW-A Attending Eliceo Henderson MD   [facility] Day # 25 PCP Kianna Duenas MD     Date of Admission: 20  Date 4/22/20  He has received 20mg IV Haldol and 12mg IV Ativan the past 24 hrs. He is off Propofol this AM. Remains on precedex and the scheduled Seroquel/Methadone/Librium. He is tracking with his eyes now. He continues to kick with his legs.  No UE movement Intravenous, Q8H  •  Vancomycin HCl (VANCOCIN) 1,000 mg in sodium chloride 0.9% 250 mL IVPB add-vantage, 15 mg/kg, Intravenous, Q12H  •  QUEtiapine Fumarate (SEROQUEL) tab 50 mg, 50 mg, Per NG Tube, 4 times per day  •  Enoxaparin Sodium (LOVENOX) 40 MG/0.4 mg, 650 mg, Oral, Q6H PRN **OR** acetaminophen (TYLENOL) 650 MG rectal suppository 650 mg, 650 mg, Rectal, Q6H PRN  •  PEG 3350 (MIRALAX) powder packet 17 g, 17 g, Oral, Daily PRN  •  magnesium hydroxide (MILK OF MAGNESIA) 400 MG/5ML suspension 30 mL, 30 m

## 2020-04-24 NOTE — PLAN OF CARE
Received intubated with light sedation with Precedex. Tube feeds at goal but placed on hold for possible extubation. Passed spontaneous breathing trial, placed on weaning trial per RT.  Lasix 40 IVP given per Dr Clemens Masters order to remove fluid prior to potent

## 2020-04-24 NOTE — PLAN OF CARE
Received on ventilator on precedex. Very restless an agitated. Trashes around in bed. Medicated with scheduled medications and prn ativan. Moves all extremities but does not follow commands. Pt finally calm after midnight seroquel.   Blood culture results r toileting schedule  Outcome: Progressing     Problem: CARDIOVASCULAR - ADULT  Goal: Maintains optimal cardiac output and hemodynamic stability  Description  INTERVENTIONS:  - Monitor vital signs, rhythm, and trends  - Monitor for bleeding, hypotension and Progressing     Problem: SKIN/TISSUE INTEGRITY - ADULT  Goal: Skin integrity remains intact  Description  INTERVENTIONS  - Assess and document risk factors for pressure ulcer development  - Assess and document skin integrity  - Monitor for areas of redness

## 2020-04-24 NOTE — PROGRESS NOTES
[facility]  Progress Note    Espinoza Cardoza Patient Status:  Inpatient    1975 MRN [id number]   [facility] 4SW-A Attending Royal Alexi MD   Hosp Day # 25 PCP Josh Mae MD     Subjective/ Interval hx:  Espinoza Cardoza is a(n) 41 year Recent Labs   Lab 04/22/20  0425 04/23/20  0427 04/24/20  0404   * 133* 162*   BUN 16 11 11   CREATSERUM 0.70 0.58* 0.69*   GFRAA 133 143 133   GFRNAA 115 124 115   CA 8.2* 8.1* 8.8   ALB 3.3* 3.0* 3.2*   * 136 139   K 3.6 4.0 3.4*   CL mg Oral Daily   • Albuterol Sulfate HFA  4 puff Inhalation TID   • PEG 3350  17 g Oral Daily   • Senna  5 mL Oral BID   • Insulin Aspart Pen  4-20 Units Subcutaneous 4 times per day   • pantoprazole (PROTONIX) IV push  40 mg Intravenous Q12H   • artificial zithromycin x5 days(started zpak 3/31), hydroxychloroquine x 4 days (stopped 4/5 due to worsening prolonged QT); given tocilizumab x1 dose, 4/2  · Monitor COVID labs/ inflammatory markers; majority improved  · Continue tube feeds and bowel regimen  · [facility]

## 2020-04-24 NOTE — PROGRESS NOTES
Reviewed overnight course with nursing staff. Agitation and delirium continue to be the major issues.     Currently on just IV precedex at 0.8 mcg - following some commands this am    Blood pressures and heart rates continue to fluctuate with levels of roderick

## 2020-04-24 NOTE — PROGRESS NOTES
Received pt on VC+ 12/450/40%/+5. 4 puffs albuterol given TID. No changes made over night. Will continue to monitor.       04/24/20 0322   Vent Information   $ RT Standby Charge (per 15 min) 1  (vent check)   $ Vent Care / Non-Invasive Subsequent Day Yes

## 2020-04-24 NOTE — PROGRESS NOTES
[facility]  Progress Note    Tory Rolon Patient Status:  Inpatient    1975 MRN [id number]   [facility] 4SW-A Attending [facility] Day # [facility] PCP Nino Whitley MD       SUBJECTIVE:    Chart reviewed, no new event • docusate sodium  100 mg Oral BID   • Chlorhexidine Gluconate  15 mL Mouth/[email or web address]     haloperidol lactate, LORazepam, Alum & Mag Hydroxide-Simeth, glucose **OR** Glucose-Vitamin C **OR** dextrose **OR** glucose **OR** Glucose-Vitamin C, ac

## 2020-04-24 NOTE — PLAN OF CARE
Pts sister Abhijit Connelly called and given update and notified of extubation. Used speaker phone so sister Abhijit Connelly could also talk to her bother. Ashley Harris raised eyebrows on multiple occasions while sister spoke to him but did not respond with any intelligible words.

## 2020-04-24 NOTE — PROGRESS NOTES
[facility]                INFECTIOUS DISEASE PROGRESS NOTE    Yesika Goss Patient Status:  Inpatient    1975 MRN [id number]   [facility] 4SW-A Attending Nestor Rodriguez MD   Hosp Day # 25 PCP Anna Barillas MD     Antibiotics co Abnormal (Preliminary result)    Collection Time: 04/22/20  2:48 PM   Result Value Ref Range    Blood Culture Result Staphylococcus species, not aureus (A) N/A    Blood Culture Smear Gram positive cocci in clusters (A) N/A         Radiology    cxr 4/8 Maya Montoya

## 2020-04-25 ENCOUNTER — APPOINTMENT (OUTPATIENT)
Dept: GENERAL RADIOLOGY | Facility: HOSPITAL | Age: 45
DRG: 003 | End: 2020-04-25
Attending: INTERNAL MEDICINE
Payer: COMMERCIAL

## 2020-04-25 PROCEDURE — 99232 SBSQ HOSP IP/OBS MODERATE 35: CPT | Performed by: HOSPITALIST

## 2020-04-25 PROCEDURE — 71045 X-RAY EXAM CHEST 1 VIEW: CPT | Performed by: INTERNAL MEDICINE

## 2020-04-25 PROCEDURE — 99291 CRITICAL CARE FIRST HOUR: CPT | Performed by: INTERNAL MEDICINE

## 2020-04-25 PROCEDURE — 99232 SBSQ HOSP IP/OBS MODERATE 35: CPT | Performed by: OTHER

## 2020-04-25 RX ORDER — FUROSEMIDE 10 MG/ML
40 INJECTION INTRAMUSCULAR; INTRAVENOUS ONCE
Status: COMPLETED | OUTPATIENT
Start: 2020-04-25 | End: 2020-04-25

## 2020-04-25 RX ORDER — METHADONE HYDROCHLORIDE 10 MG/1
10 TABLET ORAL 3 TIMES DAILY
Status: DISCONTINUED | OUTPATIENT
Start: 2020-04-25 | End: 2020-04-26 | Stop reason: SDUPTHER

## 2020-04-25 RX ORDER — METOPROLOL TARTRATE 5 MG/5ML
5 INJECTION INTRAVENOUS EVERY 6 HOURS
Status: DISCONTINUED | OUTPATIENT
Start: 2020-04-25 | End: 2020-04-26

## 2020-04-25 NOTE — PROGRESS NOTES
[facility]                INFECTIOUS DISEASE PROGRESS NOTE    Dana Judge Patient Status:  Inpatient    1975 MRN [id number]   [facility] 4SW-A Attending Jean Pierre Burrell MD   Hosp Day # 21 PCP Anamaria Pena MD     Antibiotics  m CULTURE, ROUTINE     Status: None    Collection Time: 04/22/20  3:11 PM   Result Value Ref Range    Urine Culture <10,000 CFU/ML Gram negative jayesh N/A         Radiology    cxr 4/8 reviewed    Problem list reviewed:  Patient Active Problem List:     Hyper

## 2020-04-25 NOTE — PLAN OF CARE
Resumed patient care at 1900, patient very restless, constantly shaking head and kicking legs. 's. Mitts, vest and ankle restraints on. On Precedex gtt at 1.5. Gave prn haldol. Gave scheduled dose of librium and methadone at 2035.  Patient still restl

## 2020-04-25 NOTE — PROGRESS NOTES
[facility]  Report of Psychiatric Progress Note    Brandy Buenrostro Patient Status:  Inpatient    1975 MRN [id number]   [facility] 4SW-A Attending Taya Cruz MD   [facility] Day # 21 PCP George Tejeda MD     Date of Admission: 20  Date oriented to self. 4/24/20  Extubated this afternoon!!! Seroquel caused him to be drowsy per RN. He is off propofol and remains on precedex. He is tracking with his eyes and following simple commands.      4/23/20  Got 15mg IV Haldol and 10mg IV Ativan p Diabetes Paternal Uncle    • Other (chf) Paternal Uncle       reports that he has quit smoking. He has a 8.50 pack-year smoking history. He has never used smokeless tobacco. He reports current alcohol use. He reports that he does not use drugs.     Allergabdias injection 50 mL, 50 mL, Intravenous, Q15 Min PRN **OR** glucose (DEX4) oral liquid 30 g, 30 g, Oral, Q15 Min PRN **OR** Glucose-Vitamin C (DEX-4) chewable tab 8 tablet, 8 tablet, Oral, Q15 Min PRN  •  Pantoprazole Sodium (PROTONIX) 40 mg in Sodium Chloride 27.4 04/25/2020    .0 04/25/2020    CREATSERUM 0.79 04/25/2020    BUN 18 04/25/2020     04/25/2020    K 4.0 04/25/2020     04/25/2020    CO2 26.0 04/25/2020     04/25/2020    CA 9.1 04/25/2020    ALB 3.6 04/25/2020    ALKPHO 48 04

## 2020-04-25 NOTE — PROGRESS NOTES
[facility] dosing service    Follow-up Pharmacokinetic Consult for Vancomycin Dosing     Jamar Riley is a 41year old male who is being treated for possible VAP. Patient is on day 2 of Vancomycin and is currently receiving 1 gm IV every 12 hours.   Goal Lucina Bernheim, PharmD  4/24/2020  10:13 PM  [address] [facility] [address]: [phone]

## 2020-04-25 NOTE — PROGRESS NOTES
Reviewed with nursing staff and Dr. Mauro Belcher. Appreciate Dr. Albert Rojas ongoing advice regarding sedation. Continues to remain intermittently agitated and delirious although he is making some progress    Currently on precedex - extubated yesterday!     Tmax 1

## 2020-04-25 NOTE — PROGRESS NOTES
[facility]  Progress Note    Radha Able Patient Status:  Inpatient    1975 MRN [id number]   [facility] 4SW-A Attending Tania Funk MD   [address] Day # 21 PCP oD Cespedes MD     Subjective/ Interval hx:  Radha Able is a(n) 41 year 04/23/20  0443 04/24/20  0424 04/25/20  0421   WBC 7.5 6.3 16.5*   HGB 8.6* 8.4* 9.3*   HCT 26.2* 25.8* 27.4*   .0 380.0 496.0*       Recent Labs   Lab 04/23/20  0427 04/24/20  0404 04/24/20  1347 04/25/20  0421   * 162*  --  161*   BUN 11 11 QUEtiapine Fumarate  25 mg Per NG Tube 4 times per day   • vancomycin  15 mg/kg Intravenous Q8H   • meropenem  500 mg Intravenous Q8H   • enoxaparin  0.5 mg/kg Subcutaneous 2 times per day   • chlordiazePOXIDE HCl  50 mg Per NG Tube TID   • Methadone HCl monitoring of hemodynamics, goal MAP >65, norepi weaned off  · Completed COVID treatment regimen: zithromycin x5 days(started zpak 3/31), hydroxychloroquine x 4 days (stopped 4/5 due to worsening prolonged QT); given tocilizumab x1 dose, 4/2  · Monitor COV

## 2020-04-25 NOTE — PLAN OF CARE
Received care of patient 0730. Patient restless, agitated. Follows very minimal, simple commands. Precedex infusing, PRN ativan and haldol as needed for agitation. 2L NC. Productive cough with thick, white sputum. ST on tele. VSS.  Best draining to gravit

## 2020-04-26 ENCOUNTER — APPOINTMENT (OUTPATIENT)
Dept: GENERAL RADIOLOGY | Facility: HOSPITAL | Age: 45
DRG: 003 | End: 2020-04-26
Attending: INTERNAL MEDICINE
Payer: COMMERCIAL

## 2020-04-26 PROCEDURE — 99291 CRITICAL CARE FIRST HOUR: CPT | Performed by: INTERNAL MEDICINE

## 2020-04-26 PROCEDURE — 99232 SBSQ HOSP IP/OBS MODERATE 35: CPT | Performed by: OTHER

## 2020-04-26 PROCEDURE — 71045 X-RAY EXAM CHEST 1 VIEW: CPT | Performed by: INTERNAL MEDICINE

## 2020-04-26 PROCEDURE — 99232 SBSQ HOSP IP/OBS MODERATE 35: CPT | Performed by: HOSPITALIST

## 2020-04-26 RX ORDER — METOPROLOL TARTRATE 5 MG/5ML
2.5 INJECTION INTRAVENOUS EVERY 8 HOURS
Status: DISCONTINUED | OUTPATIENT
Start: 2020-04-26 | End: 2020-04-27

## 2020-04-26 RX ORDER — METHADONE HYDROCHLORIDE 5 MG/5ML
10 SOLUTION ORAL 3 TIMES DAILY
Status: DISCONTINUED | OUTPATIENT
Start: 2020-04-26 | End: 2020-04-27

## 2020-04-26 RX ORDER — POTASSIUM CHLORIDE 29.8 MG/ML
40 INJECTION INTRAVENOUS ONCE
Status: COMPLETED | OUTPATIENT
Start: 2020-04-26 | End: 2020-04-26

## 2020-04-26 RX ORDER — QUETIAPINE 25 MG/1
25 TABLET, FILM COATED ORAL 3 TIMES DAILY
Status: DISCONTINUED | OUTPATIENT
Start: 2020-04-26 | End: 2020-04-27

## 2020-04-26 NOTE — PLAN OF CARE
On and off lethargic. Opened eyes after hours off holding all psychopharmacology meds. On levophed d/t low SBP in the 70s. Became alert and restless around 2200. Gave medication per MD orders and precedex was turned on. By midnight he became drowsy.  Will o

## 2020-04-26 NOTE — PROGRESS NOTES
[facility]  Progress Note    Brandy Buenrostro Patient Status:  Inpatient    1975 MRN [id number]   [facility] [place] 4SW-A Attending Jim Gonzalez MD   Hosp Day # 25 PCP George Tejeda MD     Subjective/ Interval hx:  Brandy Buenrostro is a(n) 41 year BUN 11 11  --  18   CREATSERUM 0.58* 0.69*  --  0.79   GFRAA 143 133  --  126   GFRNAA 124 115  --  109   CA 8.1* 8.8  --  9.1   ALB 3.0* 3.2*  --  3.6    139  --  136   K 4.0 3.4* 4.2 4.0    106  --  105   CO2 25.0 29.0  --  26.0   ALKPHO 49 2 times per day   • chlordiazePOXIDE HCl  50 mg Per NG Tube TID   • fenofibrate micronized  134 mg Oral Daily   • PEG 3350  17 g Oral Daily   • Senna  5 mL Oral BID   • Insulin Aspart Pen  4-20 Units Subcutaneous 4 times per day   • pantoprazole (PROTONIX) worsening prolonged QT); given tocilizumab x1 dose, 4/2  · Monitor COVID labs/ inflammatory markers; improved  · Continue tube feeds and bowel regimen; speech eval when mentation improved  · Follow fluid balance, lytes and urine output, maintain net neutra

## 2020-04-26 NOTE — PROGRESS NOTES
Reviewed overnight course with nursing staff. Discussed with Dr. Toney Israel and reviewed with Dr. Eulalia Briceno.     Issues with sedation remain similar - still either lethargic or agitated - little middle groiund    Vitals continue to fluctuate with level of sedation -

## 2020-04-26 NOTE — PROGRESS NOTES
[facility]  Report of Psychiatric Progress Note    Dana Judge Patient Status:  Inpatient    1975 MRN [id number]   [facility] 4SW-A Attending Michael Alves MD   [facility] Day # 25 PCP Anamaria Pena MD     Date of Admission: 20  Date precedex on and off for agitation. One of the RN's that speaks Dominick said he can answer simple questions (yes/no) and denied being in pain. He is only oriented to self. 4/24/20  Extubated this afternoon!!! Seroquel caused him to be drowsy per RN.  He of Onset   • Hypertension Father    • Other (stent placement) Father 59   • Other (elevated blood sugar) Father    • Hypertension Paternal Uncle    • Diabetes Paternal Uncle    • Other (chf) Paternal Uncle       reports that he has quit smoking.  He has a 8 4-20 Units, Subcutaneous, 4 times per day  •  glucose (DEX4) oral liquid 15 g, 15 g, Oral, Q15 Min PRN **OR** Glucose-Vitamin C (DEX-4) chewable tab 4 tablet, 4 tablet, Oral, Q15 Min PRN **OR** dextrose 50 % injection 50 mL, 50 mL, Intravenous, Q15 Min PRN and repetition  Fund of Knowledge: unable to test    Insight: limited   Judgment: limited    Laboratory Data:  Lab Results   Component Value Date    WBC 13.0 04/26/2020    HGB 8.6 04/26/2020    HCT 26.5 04/26/2020    .0 04/26/2020    CREATSERUM 0.91

## 2020-04-26 NOTE — PROGRESS NOTES
[facility]  Progress Note     London Wagner Patient Status:  Inpatient    1975 MRN [id number]   [facility]SW[facility]A [facility] PCP Dearl MD Miguel Angel     Chief Complaint: SOB    S: Patient Sukhjinder Garcia Q8H   • QUEtiapine Fumarate  25 mg Per NG Tube TID   • spironolactone  25 mg Oral BID (Diuretic)   • meropenem  500 mg Intravenous Q8H   • enoxaparin  0.5 mg/kg Subcutaneous 2 times per day   • chlordiazePOXIDE HCl  50 mg Per NG Tube TID   • fenofibrate mi closely  16. 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 Off propofol  17. Malnutrition. Continue tube feeds with speech eval.       Plan of care: As above.     Quality:  · DVT Prophylaxis: Lovenox  · CODE status: FUll  · Best: yes  · Central line: yes    Estima

## 2020-04-27 PROCEDURE — 99232 SBSQ HOSP IP/OBS MODERATE 35: CPT | Performed by: OTHER

## 2020-04-27 PROCEDURE — 99291 CRITICAL CARE FIRST HOUR: CPT | Performed by: INTERNAL MEDICINE

## 2020-04-27 PROCEDURE — 99232 SBSQ HOSP IP/OBS MODERATE 35: CPT | Performed by: HOSPITALIST

## 2020-04-27 RX ORDER — METOPROLOL TARTRATE 5 MG/5ML
5 INJECTION INTRAVENOUS EVERY 8 HOURS
Status: DISCONTINUED | OUTPATIENT
Start: 2020-04-27 | End: 2020-04-29

## 2020-04-27 RX ORDER — MINERAL OIL AND PETROLATUM 150; 830 MG/G; MG/G
OINTMENT OPHTHALMIC AS NEEDED
Status: DISCONTINUED | OUTPATIENT
Start: 2020-04-27 | End: 2020-05-01

## 2020-04-27 RX ORDER — CHLORDIAZEPOXIDE HYDROCHLORIDE 25 MG/1
25 CAPSULE, GELATIN COATED ORAL 3 TIMES DAILY
Status: DISCONTINUED | OUTPATIENT
Start: 2020-04-27 | End: 2020-04-29

## 2020-04-27 RX ORDER — QUETIAPINE 25 MG/1
25 TABLET, FILM COATED ORAL DAILY PRN
Status: DISCONTINUED | OUTPATIENT
Start: 2020-04-27 | End: 2020-05-05

## 2020-04-27 RX ORDER — METHADONE HYDROCHLORIDE 5 MG/5ML
10 SOLUTION ORAL 2 TIMES DAILY
Status: DISCONTINUED | OUTPATIENT
Start: 2020-04-27 | End: 2020-04-28

## 2020-04-27 RX ORDER — QUETIAPINE 25 MG/1
50 TABLET, FILM COATED ORAL NIGHTLY
Status: DISCONTINUED | OUTPATIENT
Start: 2020-04-27 | End: 2020-05-05

## 2020-04-27 RX ORDER — FUROSEMIDE 10 MG/ML
20 INJECTION INTRAMUSCULAR; INTRAVENOUS ONCE
Status: COMPLETED | OUTPATIENT
Start: 2020-04-27 | End: 2020-04-27

## 2020-04-27 NOTE — PLAN OF CARE
Sleepy but awakes to verbal and when you enter the room. Repeats words when asked, moans and ama quietly at times. More sleepy this afternoon. Held librium.  hung here and orders noted.  Restraints for safety, repositioned q2hrs,and used vibration/percus

## 2020-04-27 NOTE — PROGRESS NOTES
Received patient this am alert to self,very anxious,moving in bed   Lungs diminished bilaterally  NSR to ST on monitor  dobhoff with tube feeds at goal  Haldol and Ativan given PRN per [facility]  Restraints in place for patient safety  Family updated via phone

## 2020-04-27 NOTE — PROGRESS NOTES
Reviewed overnight course with nursing staff.     Major issue continues to be trying to find the best regimen to balance sedation and agitation    Heart rates and blood pressures elevated the past 24 hours  -- Afebrile    Not examined at bedside today - rev

## 2020-04-27 NOTE — DIETARY NOTE
[facility] ASSESSMENT    Pt does not meet malnutrition criteria.     NUTRITION DIAGNOSIS/PROBLEM:  Inadequate oral intake related to physiological causes as evidenced by previous vent status, intubated, sedated, prone and need fo markers improving. NG output 250 ml over 24 hours; improving. On scheduled reglan, consider starting trickle feeds again for GI tract integrity. Will continue to monitor.   4/6- Remains sedated, vented on 50% FiO2, PEEP 10, ECMO, feeds on hold at this time

## 2020-04-27 NOTE — PROGRESS NOTES
[facility]                INFECTIOUS DISEASE PROGRESS NOTE    Espinoza Cardoza Patient Status:  Inpatient    1975 MRN [id number]   [facility] 4SW-A Attending Royal Alexi MD   Hosp Day # 22 PCP Josh Mae MD     Antibiotics  m Growth 2 Days N/A   2. URINE CULTURE, ROUTINE     Status: None    Collection Time: 04/22/20  3:11 PM   Result Value Ref Range    Urine Culture <10,000 CFU/ML Gram negative jayesh N/A         Radiology    cxr 4/8 reviewed    Problem list reviewed:  Patient A

## 2020-04-27 NOTE — PROGRESS NOTES
[facility]  Progress Note    Ole Alegre Patient Status:  Inpatient    1975 MRN [id number]   [facility] 4SW-A Attending [facility] PCP Ivone Guerrero MD     Subjective:  Ole Alegre is a(n) 41year old m --  26.0 27.0   ALKPHO 47  --  48 43*   AST 21  --  35 29   ALT 28  --  32 28   BILT 0.7  --  0.8 0.6   TP 7.0  --  8.0 7.5       Recent Labs   Lab 04/24/20  0404 04/25/20  0421 04/26/20  0611   MG 1.6 2.0 2.1       Lab Results   Component Value Date    PH days(started zpak 3/31), hydroxychloroquine x 4 days (stopped 4/5 due to worsening prolonged QT); given tocilizumab x1 dose, 4/2  · Monitor COVID labs/ inflammatory markers; improved  · Continue tube feeds and bowel regimen; speech eval when mentation impr

## 2020-04-27 NOTE — PAYOR COMM NOTE
--------------  CONTINUED STAY REVIEW    Payor: [address] #:  [id number]  Authorization Number: [id number]        4/26 HOSP    S: Patient extubated yesterday.  Pt more sedated due to meds.     Vital signs:  Temp:  [98.3 Meropenem started 4/10. completed  2. Vancomycin started 4/10 empirically as well, now held, ID following  3. Cultures re-drawn 4/10 and also negative  8. Leukocytosis has normalized  9. Prolonged QTC  1. Monitor  2.  Minimize any potentially offending drug or agitated - little middle groiund     Vitals continue to fluctuate with level of sedation - remains on 2 L O2 via NC     Currently on low dose levophed  104/61       Afebrile  HR 93     No bedside exam today     Good urine output but overall volume posit Date Action Dose Route User    4/27/2020 0912 Given 20 mg Intravenous Maureen Howe, RN      haloperidol lactate (HALDOL) 5 MG/ML injection 5 mg     Date Action Dose Route User    4/26/2020 1445 Given 5 mg Intravenous Lorraine Escobar, RN 8.8 mg     Date Action Dose Route User    4/26/2020 2032 Given 8.8 mg Oral Maryam Peterson, RN      spironolactone (ALDACTONE) tab 25 mg     Date Action Dose Route User    4/27/2020 0911 Given 25 mg Oral [place]    4/26/2020 1615 Given

## 2020-04-27 NOTE — PROGRESS NOTES
[facility]  Progress Note     Roni Alcala Patient Status:  Inpatient    1975 MRN [id number]   [facility] 4SW-A Attending [facility] Day # 22 PCP Selene Carlos MD     Chief Complaint: SOB    S: Patient Tamie Callas BID   • QUEtiapine Fumarate  50 mg Per NG Tube Nightly   • spironolactone  25 mg Oral BID (Diuretic)   • meropenem  500 mg Intravenous Q8H   • enoxaparin  0.5 mg/kg Subcutaneous 2 times per day   • fenofibrate micronized  134 mg Oral Daily   • PEG 3350  17 speech eval.       Plan of care: As above.     Quality:  · DVT Prophylaxis: Lovenox  · CODE status: FUll  · Best: yes  · Central line: yes    Estimated date of discharge: TBD  Discharge is dependent on: clinical improvement  At this point Mr. Clem Manley is expe

## 2020-04-27 NOTE — PROGRESS NOTES
[facility]  Report of Psychiatric Progress Note    Imelda Silva Patient Status:  Inpatient    1975 MRN [id number]   [facility] 4SW-A Attending Ethan Syed MD   [facility] Day # 22 PCP Guillermo Lange MD     Date of Admission: 20  Date prn and Haldol prn and Precedex prn.     4/25/20-   He alternates between agitation with leg kicking and somnolence from sedation. He still requires precedex on and off for agitation.  One of the RN's that speaks Dominick said he can answer simple questions FEMUR FRACTURE SURGERY  2007   • SKIN SURGERY      lipoma removal right shoulder area and under left breast     Family History   Problem Relation Age of Onset   • Hypertension Father    • Other (stent placement) Father 59   • Other (elevated blood sugar) F times per day  •  glucose (DEX4) oral liquid 15 g, 15 g, Oral, Q15 Min PRN **OR** Glucose-Vitamin C (DEX-4) chewable tab 4 tablet, 4 tablet, Oral, Q15 Min PRN **OR** dextrose 50 % injection 50 mL, 50 mL, Intravenous, Q15 Min PRN **OR** glucose (DEX4) oral limited    Laboratory Data:  Lab Results   Component Value Date    PGLU 126 04/27/2020

## 2020-04-27 NOTE — PLAN OF CARE
Speaks Tagalog, oriented x 1, confused and agitated when awake. Safety precautions mmaintained.  Gave due Methadone,  Librium and Seroquel last night, was restless the shole night, able to go to sleep at around 0400H, drowsy this AM, Seroquel held because o

## 2020-04-28 PROCEDURE — 99232 SBSQ HOSP IP/OBS MODERATE 35: CPT | Performed by: OTHER

## 2020-04-28 PROCEDURE — 99232 SBSQ HOSP IP/OBS MODERATE 35: CPT | Performed by: HOSPITALIST

## 2020-04-28 PROCEDURE — 99291 CRITICAL CARE FIRST HOUR: CPT | Performed by: INTERNAL MEDICINE

## 2020-04-28 RX ORDER — FUROSEMIDE 10 MG/ML
20 INJECTION INTRAMUSCULAR; INTRAVENOUS ONCE
Status: COMPLETED | OUTPATIENT
Start: 2020-04-28 | End: 2020-04-28

## 2020-04-28 RX ORDER — POTASSIUM CHLORIDE 1.5 G/1.77G
40 POWDER, FOR SOLUTION ORAL ONCE
Status: COMPLETED | OUTPATIENT
Start: 2020-04-28 | End: 2020-04-28

## 2020-04-28 NOTE — PROGRESS NOTES
[facility]  Progress Note    Abdi Limon Patient Status:  Inpatient    1975 MRN [id number]   [facility] 4SW-A Attending [facility] Day # 32 PCP Karishma Keller MD     Subjective:  Abdi Limon is a(n) 41year old m 32 28 25   BILT 0.8 0.6 0.6   TP 8.0 7.5 7.2       Recent Labs   Lab 04/25/20  0421 04/26/20  0611 04/28/20  0425   MG 2.0 2.1 2.3       Lab Results   Component Value Date    PHOS 4.8 04/28/2020        No results for input(s): PT, INR, PTT in the last 168 tube feeds and bowel regimen; speech eval today  · Follow fluid balance, lytes and urine output, maintain net neutral fluid balance.  Low dose lasix per cardiology  · 4/22 duplex negative for DVT; changed to intermediate dosing of LMWH  · Transfuse for hgb

## 2020-04-28 NOTE — PLAN OF CARE
Received pt at 1930 on 2L nc. Vitals stable. Pt has incomprehensible speech, follows commands sometimes, pupils equal, round and reactive. Pt has not voided since 1800. Bladder scan done x 3. Pt straight cath'd at 0500, 300 mL arthur urine drained.  Around 0

## 2020-04-28 NOTE — PLAN OF CARE
More alert today, able to have some conversations, face time with family. Cried when he saw his dad on the ipad, spoke tagalog, is still confused but  was more consistant today knowing that he was in the hospital and recognizing the nurse.  \"saying sweet c

## 2020-04-28 NOTE — OCCUPATIONAL THERAPY NOTE
OCCUPATIONAL THERAPY EVALUATION - INPATIENT     Room Number: 471/471-A  Evaluation Date: 4/28/2020  Type of Evaluation: Initial  Presenting Problem: +COVID-19, PNA, ARDS, respiratory failure     Physician Order: IP Consult to Occupational Therapy  Reason f Her father was in the hospital at [facility] but is now home. Her mother is in ICU on vent at [facility]. 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 SUBJECTIVE   \"You guys are confusing me. \"     Patient FINDINGS  Neurological Findings: None                ACTIVITY TOLERANCE                         O2 SATURATIONS                ACTIVITIES OF DAILY LIVING ASSESSMENT  AM-PAC ‘6-Clicks’ Inpatient Daily Activity Short Form  How much help from another person do balance, strength, ROM and functional mobility. These deficits impact the patient’s ability to participate in BADL, instrumental activities of daily living, rest and sleep, work, leisure and social participation.      The patient is functioning below his pr 5x/week  Number of Visits to Meet Established Goals: 10    ADL Goals   Patient will perform grooming: with mod assist    Functional Transfer Goals  Patient will transfer from bed to chair:  with max assist  Patient will transfer from supine to sit:  with m

## 2020-04-28 NOTE — CM/SW NOTE
Care Progression Note:  Active Acute Medical Issue:   40 y/o male with acute respiratory failure/ARDS due to COVID pneumonia, and ARDS, on vent support for 22 days, s/p safe extubation on 4/24, s/p ECMO on 4/4, which was  weaned off 4/15  S/p Azitho/plaque back to ICU yesterday to r/o CVA, neuro consult pending. Will follow.

## 2020-04-28 NOTE — PROGRESS NOTES
[facility]  Progress Note     Abdi Limon Patient Status:  Inpatient    1975 MRN [id number]   [facility] 4SW-A Attending [facility] Day # 32 PCP Karishma Keller MD     Chief Complaint: SOB    S: Patient Cristobal Foster (Diuretic)   • meropenem  500 mg Intravenous Q8H   • enoxaparin  0.5 mg/kg Subcutaneous 2 times per day   • fenofibrate micronized  134 mg Oral Daily   • PEG 3350  17 g Oral Daily   • Senna  5 mL Oral BID   • Insulin Aspart Pen  4-20 Units Subcutaneous 4 t FUll  · Best: yes  · Central line: yes    Estimated date of discharge: TBD  Discharge is dependent on: clinical improvement  At this point Mr. Nadia Gore is expected to be discharge to: TBD    Plan of care discussed with rn.     Amanda Dorsey DO

## 2020-04-28 NOTE — PROGRESS NOTES
Reviewed overnight course with nursing staff.     Dr. Ada Edwards recommendations for weaning of sedation noted and greatly appreciated as this currently remains the major issue hindering his recovery    Afebrile /60-70   Sinus     Not directly exami

## 2020-04-28 NOTE — PROGRESS NOTES
[facility]                INFECTIOUS DISEASE PROGRESS NOTE    Jett Murguia Patient Status:  Inpatient    1975 MRN [id number]   [facility] 4SW-A Attending Araceli Rosales MD   Hosp Day # 32 PCP Sina Valenzuela MD     Antibiotics  m CULTURE, ROUTINE     Status: None    Collection Time: 04/22/20  3:11 PM   Result Value Ref Range    Urine Culture <10,000 CFU/ML Gram negative jayesh N/A         Radiology    cxr 4/8 reviewed    Problem list reviewed:  Patient Active Problem List:     Hyper
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
## 2020-04-28 NOTE — PHYSICAL THERAPY NOTE
PHYSICAL THERAPY EVALUATION - INPATIENT     Room Number: 471/471-A  Evaluation Date: 4/28/2020  Type of Evaluation: Initial  Physician Order: PT Eval and Treat    Presenting Problem: [address]  Reason for Therapy: Mobility Dysfunction and Discharge Kareem left;Bed/chair alarm  Fall Risk: High fall risk    WEIGHT BEARING RESTRICTION  Weight Bearing Restriction: None                PAIN ASSESSMENT  Ratin          COGNITION  · Attention Span:  difficulty attending to directions and difficulty dividing atte commode, etc.): Unable   -   Moving from lying on back to sitting on the side of the bed?: A Lot   How much help from another person does the patient currently need. ..   -   Moving to and from a bed to a chair (including a wheelchair)?: Total   -   Need to Pt presenting with COVID19 pneumonia, shock, ST changes with possible acute MI, and acute encephalopathy. Pt being followed by Dr. Tran Ching for delirium and development of PTSD. Pertinent comorbidities and personal factors impacting therapy include HTN.  In thi

## 2020-04-29 PROCEDURE — 99232 SBSQ HOSP IP/OBS MODERATE 35: CPT | Performed by: HOSPITALIST

## 2020-04-29 PROCEDURE — 99232 SBSQ HOSP IP/OBS MODERATE 35: CPT | Performed by: OTHER

## 2020-04-29 PROCEDURE — 99291 CRITICAL CARE FIRST HOUR: CPT | Performed by: INTERNAL MEDICINE

## 2020-04-29 RX ORDER — SPIRONOLACTONE 25 MG/1
25 TABLET ORAL DAILY
Status: DISCONTINUED | OUTPATIENT
Start: 2020-04-29 | End: 2020-05-03

## 2020-04-29 RX ORDER — POTASSIUM CHLORIDE 14.9 MG/ML
20 INJECTION INTRAVENOUS ONCE
Status: COMPLETED | OUTPATIENT
Start: 2020-04-29 | End: 2020-04-29

## 2020-04-29 RX ORDER — HALOPERIDOL 5 MG/ML
2.5 INJECTION INTRAMUSCULAR EVERY 6 HOURS PRN
Status: DISCONTINUED | OUTPATIENT
Start: 2020-04-29 | End: 2020-05-05

## 2020-04-29 RX ORDER — CHLORDIAZEPOXIDE HYDROCHLORIDE 25 MG/1
25 CAPSULE, GELATIN COATED ORAL 3 TIMES DAILY
Status: COMPLETED | OUTPATIENT
Start: 2020-04-29 | End: 2020-04-29

## 2020-04-29 RX ORDER — CHLORDIAZEPOXIDE HYDROCHLORIDE 10 MG/1
10 CAPSULE, GELATIN COATED ORAL 3 TIMES DAILY
Status: DISCONTINUED | OUTPATIENT
Start: 2020-04-30 | End: 2020-04-30

## 2020-04-29 NOTE — PROGRESS NOTES
[facility]  Progress Note     Jett Blade Patient Status:  Inpatient    1975 MRN [id number]   [facility] Sina Valenzuela MD     Chief Complaint: SOB    S: Patient Maxime Almeida chlordiazePOXIDE HCl  25 mg Per NG Tube TID   • [START ON 4/30/2020] chlordiazePOXIDE HCl  10 mg Per NG Tube TID   • QUEtiapine Fumarate  50 mg Per NG Tube Nightly   • enoxaparin  0.5 mg/kg Subcutaneous 2 times per day   • fenofibrate micronized  134 mg Or ICU if bed available.      Quality:  · DVT Prophylaxis: Lovenox  · CODE status: FUll  · Best: yes  · Central line: yes    Estimated date of discharge: TBD  Discharge is dependent on: clinical improvement  At this point Mr. Julieta Teixeira is expected to be discharge

## 2020-04-29 NOTE — PROGRESS NOTES
[facility]  Progress Note    Abdi Limon Patient Status:  Inpatient    1975 MRN [id number]   [facility] 4SW-A Attending Ilir Luna MD   [facility] Day # 32 PCP Karishma Keller MD     Subjective/ Interval hx:  Abdi Limon is a(n) 41 year 27.0 29.0 27.0   ALKPHO 43* 38* 40*   AST 29 15 21   ALT 28 25 33   BILT 0.6 0.6 0.6   TP 7.5 7.2 7.8       Recent Labs   Lab 04/25/20  0421 04/26/20  0611 04/28/20  0425   MG 2.0 2.1 2.3       Lab Results   Component Value Date    PHOS 4.8 04/28/2020 pantoprazole (PROTONIX) IV push  40 mg Intravenous Q12H   • docusate sodium  100 mg Oral BID     Medications reviewed.     Assessment:  · Acute hypoxic respiratory failure, acute respiratory distress syndrome due to COVID pneumonia, intubated 4/2 (day 13), [facility]  Critical care time 35 minutes. The patient is critically ill and at high risk for clinical deterioration.
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
## 2020-04-29 NOTE — PLAN OF CARE
Care assumed on patient. Patient remained on 2L HFNC during the day. Hemodynamics stable. See flowsheet for assessment. Patient awake, able to respond to some commands. Patient tolerating TF and free water flushes.   Note to have periods of restlessness an

## 2020-04-29 NOTE — PHYSICAL THERAPY NOTE
PHYSICAL THERAPY TREATMENT NOTE - INPATIENT    Room Number: 471/471-A     Session: 1   Number of Visits to Meet Established Goals: 6     History related to current admission: Pt is a 41 y.o. male admitted 4/2/20 with dyspnea.  Pt tested positive for COVID1 AM-PAC '6-Clicks' INPATIENT SHORT FORM - BASIC MOBILITY  How much difficulty does the patient currently have. ..  -   Turning over in bed (including adjusting bedclothes, sheets and blankets)?: A Lot   -   Sitting down on and standing up from a c edgar galvan.        DISCHARGE RECOMMENDATIONS  PT Discharge Recommendations: Acute rehabilitation     PLAN  PT Treatment Plan: Bed mobility; Endurance; Energy conservation;Patient education;Gait training;Neuromuscular re-educate;Strengthening;Transfer training; Brenda Johnson

## 2020-04-29 NOTE — PLAN OF CARE
Received at 1930H alert and oriented to person, disoriented to time place and situation, follows commands, speaks Tagalog. , he states that he works as a , gave me story about his life in the [facility], and that he misses his mom and dad and he wan

## 2020-04-29 NOTE — PROGRESS NOTES
Reviewed overnight course with nursing staff. His nurse was able to speak with him in his native language and he demonstrated marked improvement in his cognition and perceived confusion - this was very encouraging    No restlessness overnight.     Afebrile

## 2020-04-29 NOTE — PROGRESS NOTES
[facility]  Report of Psychiatric Progress Note    Lindsay Whittington Patient Status:  Inpatient    1975 MRN [id number]   [facility] 4SW-A Attending Shanelle Perez MD   [facility] Day # 32 PCP Roberto Brown MD     Date of Admission: 20  Date more attentive, but still disorganized at times and disoriented to place and situation. 4/27/20-   He was able to say, \"Oh, that hurt! \" when given the Lovenox. He tracts with his eyes. Follows some commands (makes a face) but not all the time per RN. legs). No purposeful movement or following commands. 4/20/20  Per staff, restlessness and agitation persists. He is not following commands. He remains on many sedating meds-- Ativan, dilaudid, methadone, precedex, propofol. Remains intubated.      Past (HALDOL) 5 MG/ML injection 5 mg, 5 mg, Intravenous, Q6H PRN  •  Alum & Mag Hydroxide-Simeth (MAALOX) oral suspension 30 mL, 30 mL, Oral, QID PRN  •  fenofibrate micronized (LOFIBRA) cap 134 mg, 134 mg, Oral, Daily  •  PEG 3350 (MIRALAX) powder packet 17 g, unable to obtain  Affect: restricted  Thought process: linear to basic questions, at times disorganized  Thought content: unable to obtain    Orientation: self   Attention and Concentration: poor   Memory: impaired recent  Language: did not test naming and

## 2020-04-29 NOTE — CM/SW NOTE
THERESA approved patient for acute rehab. Pt's sister Armin Murphy updated.     Makayla Self LCSW  /Discharge Planner  [phone]

## 2020-04-29 NOTE — PROGRESS NOTES
[facility] Note:  Discontinuation of Stress Ulcer Prophylaxis     Lyndsey Juares is a 41year old male who was initiated on stress ulcer prophylaxis with pantoprazole (PROTONIX). The patient no longer meets criteria for stress ulcer prophylaxis.   It has b

## 2020-04-29 NOTE — DIETARY NOTE
[facility] ASSESSMENT    Pt does not meet malnutrition criteria.     NUTRITION DIAGNOSIS/PROBLEM:  Inadequate oral intake related to physiological causes as evidenced by previous vent status, intubated, sedated, prone and need fo paralytics. Pt tolerating TPN. Inflammatory markers improving. NG output 250 ml over 24 hours; improving. On scheduled reglan, consider starting trickle feeds again for GI tract integrity. Will continue to monitor.   4/6- Remains sedated, vented on 50% FiO2

## 2020-04-29 NOTE — CONSULTS
Chart reviewed for rehab needs. Pt is a 41 y.o. male admitted 4/2/20 with dyspnea. Pt tested positive for COVID19 and required intubation 4/2/20 due to ARDS. Pt required [facility] 4/4-4/14/20. Pt extubated 4/24/20.    Medical issues:   · Shock - likely r

## 2020-04-29 NOTE — CM/SW NOTE
FLAVIO spoke to pt's sister Lorna Pereze re : therapy  recs for Acute Rehab. She is in agreement with Yamini MATA consult. PMR ordered. Lorna Braxton would like update once MJ makes decision re: rehab.     Melva Yanez, LUCINAW  /Discharge Planner  [phone]

## 2020-04-29 NOTE — PROGRESS NOTES
[facility]                INFECTIOUS DISEASE PROGRESS NOTE    Connie Gutierrez Patient Status:  Inpatient    1975 MRN [id number]   [facility] 4SW-A Attending Pernell Castleman, MD   Hosp Day # 32 PCP Daniella Mosley MD     Antibiotics  m Collection Time: 04/25/20  9:05 AM   Result Value Ref Range    Blood Culture Result No Growth 4 Days N/A   2. URINE CULTURE, ROUTINE     Status: None    Collection Time: 04/22/20  3:11 PM   Result Value Ref Range    Urine Culture <10,000 CFU/ML Gram neg

## 2020-04-29 NOTE — PROGRESS NOTES
Pt transferred to unit from ICU. Assumed care of pt at 1800. Attempted to orient pt room. Drowsy, easily aroused. Oriented x1. VSS on 2L O2. Non-productive, strong cough. SCDs on. Tele-NSR. Tolerating TF. Aspiration precautions. Brief on.  Fall precautions

## 2020-04-30 ENCOUNTER — APPOINTMENT (OUTPATIENT)
Dept: CT IMAGING | Facility: HOSPITAL | Age: 45
DRG: 003 | End: 2020-04-30
Attending: HOSPITALIST
Payer: COMMERCIAL

## 2020-04-30 PROCEDURE — 99233 SBSQ HOSP IP/OBS HIGH 50: CPT | Performed by: INTERNAL MEDICINE

## 2020-04-30 PROCEDURE — 99232 SBSQ HOSP IP/OBS MODERATE 35: CPT | Performed by: HOSPITALIST

## 2020-04-30 PROCEDURE — 70450 CT HEAD/BRAIN W/O DYE: CPT | Performed by: HOSPITALIST

## 2020-04-30 PROCEDURE — 99232 SBSQ HOSP IP/OBS MODERATE 35: CPT | Performed by: OTHER

## 2020-04-30 RX ORDER — METOPROLOL TARTRATE 50 MG/1
50 TABLET, FILM COATED ORAL
Status: DISCONTINUED | OUTPATIENT
Start: 2020-04-30 | End: 2020-05-01

## 2020-04-30 RX ORDER — POLYETHYLENE GLYCOL 3350 17 G/17G
17 POWDER, FOR SOLUTION ORAL DAILY PRN
Status: DISCONTINUED | OUTPATIENT
Start: 2020-04-30 | End: 2020-05-05

## 2020-04-30 NOTE — PROGRESS NOTES
[facility]  Progress Note    Gilberto Canas Patient Status:  Inpatient    1975 MRN [id number]   [facility] 3SW-A Attending Eliceo Henderson MD   Hosp Day # 29 PCP Kianna Duenas MD     Subjective:  Gilberto Canas is a(n) 41year old male david 137 138   K 3.8 3.8 3.9    105 105   CO2 29.0 27.0 27.0   BUN 21* 23* 21*   CREATSERUM 0.82 0.78 0.71     No results for input(s): PTP, INR, PTT in the last 168 hours.     Cultures: reviewed with sputum NRF     Radiology:      Reviewed       Medicatio as per psychiatry-now off narcotics  · Intermittent agitation/pulling at lines with plans to increase nursing care at this time    Continuing to follow carefully with you     CC     Jeramy Payne MD  4/30/2020  11:25 AM

## 2020-04-30 NOTE — PAYOR COMM NOTE
--------------  CONTINUED STAY REVIEW    Payor: [address] #:  [id number]  Authorization Number: [id number]        4/29 HOSP    S: Patient extubated.  Seems calm currently but per RN has been intermittently agitated thro prolonging QTC)  3. S/p Actemra x1 4/2  4. Trending inflamm markers, stable  7. Low grade temps  1. ceft -> zosyn on 4/2 -> Meropenem started 4/10. completed  2. Vancomycin started 4/10 empirically as well, now held, ID following  3.  Cultures re-drawn 4/10 dose, 4/2  · Monitor COVID labs/ inflammatory markers; improved  · Continue tube feeds and bowel regimen; speech following  · Follow fluid balance, lytes and urine output, maintain net neutral fluid balance.  Holding on lasix today  · 4/22 duplex negative f DIAGNOSIS/PROBLEM:  Inadequate oral intake related to physiological causes as evidenced by previous vent status, intubated, sedated, prone and need for TF. -ongoing     NUTRITION INTERVENTION:  Enteral Nutrition - Continue Vital AF 1.2 at goal rate of 65 m cap 134 mg     Date Action Dose Route User    4/29/2020 0916 Given 134 mg Oral [place] Self, RN      haloperidol lactate (HALDOL) 5 MG/ML injection 2.5 mg     Date Action Dose Route User    4/29/2020 1521 Given 2.5 mg Intravenous Payam Carl,

## 2020-04-30 NOTE — PROGRESS NOTES
Received order to transfer to ICU for higher level of care. Pt taken down to CT scan by Gerson Gamboa. Will accompany pt to ICU after CT is completed. All belongings taken to room 473. Report given to ICU RN.

## 2020-04-30 NOTE — OCCUPATIONAL THERAPY NOTE
OCCUPATIONAL THERAPY TREATMENT NOTE - INPATIENT     Room Number: 384/384-A  Session: 2   Number of Visits to Meet Established Goals: 10    Presenting Problem: +COVID-19, PNA, ARDS, respiratory failure     History related to current admission: Pt is 45 year Putting on and taking off regular lower body clothing?: Total  -   Bathing (including washing, rinsing, drying)?: A Lot  -   Toileting, which includes using toilet, bedpan or urinal? : Total  -   Putting on and taking off regular upper body clothing?: A Lo Session: In bed;Needs met;Call light within reach;RN aware of session/findings; All patient questions and concerns addressed;SCDs in place; Alarm set    ASSESSMENT   Patient is a 41year old male admitted on 4/2/2020 for COVID-19, PNA, ARDS and respiratory f Potential : Good  Frequency (Obs): 5x/week      OT Goals: progressing as of 4/30     ADL Goals   Patient will perform grooming: with mod assist     Functional Transfer Goals  Patient will transfer from bed to chair:  with max assist  Patient will transfer

## 2020-04-30 NOTE — PROGRESS NOTES
[facility]                INFECTIOUS DISEASE PROGRESS NOTE    Connie Gutierrez Patient Status:  Inpatient    1975 MRN [id number]   [facility] 3SW-A Attending Hyacinth Muñiz MD   Hosp Day # 29 PCP Daniella Mosley MD     Antibiotics: co 7.2 7.8 8.0       Vancomycin Trough (ug/mL)   Date Value   04/24/2020 10.3     Microbiology    Hospital Encounter on 04/02/20   1.  BLOOD CULTURE     Status: None    Collection Time: 04/25/20  9:05 AM   Result Value Ref Range    Blood Culture Result No Grow

## 2020-04-30 NOTE — OCCUPATIONAL THERAPY NOTE
OCCUPATIONAL THERAPY TREATMENT NOTE - INPATIENT     Room Number: 384/384-A  Session: 1   Number of Visits to Meet Established Goals: 10    Presenting Problem: +COVID-19, PNA, ARDS, respiratory failure     History related to current admission: Pt is 45 year washing, rinsing, drying)?: A Lot  -   Toileting, which includes using toilet, bedpan or urinal? : Total  -   Putting on and taking off regular upper body clothing?: Total  -   Taking care of personal grooming such as brushing teeth?: A Lot  -   Eating shamika functional level and would benefit from skilled inpatient OT to address the above deficits, maximizing patient’s ability to return safely to his prior level of function. Recommend acute rehab as pt is highly motivated to return to his PLOF.  As baseline

## 2020-04-30 NOTE — PROGRESS NOTES
[facility]    [address] [address]    Phone:  [phone]       Thank You for choosing us for your health care visit. We are glad to serve you and happy to provide you with this summary of your visit. Please help us to ensure we have accurate records. If you find anything that needs to be changed, please let our staff know as soon as possible.          Your Demographic Information     Patient Name Sex Dana Calero Female 1927       Ethnic Group Patient Race    Not of  or  Origin White      Your Visit Details     Date & Time Provider Department    3/7/2017 9:40 AM Buddy Mariee MD [facility]      Your Upcoming Appointment*(Max 10)     2017 10:00 AM CST   Office Visit with Victor M Wooten MD   [facility] ([facility], [address] )    [address] [id number] [phone]            2017  8:15 AM CDT   Follow-up Visit with Anuel Brown MD   [facility] ([facility])    [address] [place] [id number] [phone]              Your To Do List     Future Orders Please Complete On or Around Expires    BD DEXA AXIAL SKELETON  Mar 07, 2017 Mar 07, 2018    Follow-Up    Return in about 1 year (around 3/7/2018) for Medicare Wellness Visit.      We Ordered or Performed the Following     OFFICE/OUTPT VISIT EST LEVEL IV       Conditions Discussed Today or Order-Related Diagnoses        Comments    Medicare annual wellness visit, subsequent    -  Primary     Osteoporosis         Malignant neoplasm of colon, unspecified part of colon         Parkinson disease         Paroxysmal atrial fibrillation         Neurogenic orthostatic hypotension           Your Vitals Were     BP Pulse Height Weight BMI Smoking Status    109/57 (BP Location:  Reviewed overnight course with nursing staff. No major issues.     Failed recent bedside swallow - will be repeated as mentation improves    Afebrile 136/84  94 regular    Minimal oxygen requirements    No bedside exam today given Covid-19 restrictions - re STEVE, Patient Position: Sitting, Cuff Size: Regular) 67 5' 0.25\" (1.53 m) 126 lb 8.7 oz (57.4 kg) 24.51 kg/m2 Never Smoker      Medications Prescribed or Re-Ordered Today     None      Your Current Medications Are        Disp Refills Start End    metoPROLOL (TOPROL-XL) 25 MG 24 hr tablet 30 tablet 6 11/21/2016     Sig - Route: Take 1 tablet by mouth daily. - Oral    Class: Eprescribe    apixaban (ELIQUIS) 2.5 MG Tab 60 tablet 6 11/21/2016     Sig - Route: Take 1 tablet by mouth every 12 hours. - Oral    Class: Eprescribe    midodrine (PROAMATINE) 10 MG tablet 180 tablet 3 11/8/2016     Sig: TAKE ONE TABLET BY MOUTH TWICE DAILY    Class: Eprescribe    alendronate (FOSAMAX) 70 MG tablet 12 tablet 3 4/5/2016     Sig: TAKE ONE TABLET BY MOUTH ONCE A WEEK    Class: Eprescribe    carbidopa-levodopa (SINEMET)  MG per tablet 720 tablet 4 3/9/2016     Sig - Route: Take 2 tablets by mouth 4 times daily. Take about 3 hours apart. - Oral    Class: Eprescribe    Notes to Pharmacy: Hold until pt calls to fill.    calcium carbonate-vitamin D (CALCIUM 600 + D) 600-400 MG-UNIT per tablet        Sig - Route: Take 1 tablet by mouth daily. - Oral    Class: Historical Med      Allergies     Menthol HIVES    rash    Methylsal [Other] HIVES    rash      Immunizations History as of 3/7/2017     Name Date    INFLUENZA QUADRIVALENT 10/19/2016, 10/6/2015, 10/7/2014    Influenza 9/30/2013, 10/2/2012, 10/26/2011, 9/30/2010, 9/28/2009, 10/15/2008, 10/30/2007, 10/17/2006, 10/25/2004, 10/17/2001    Influenza A novel H1N1 12/14/2009    Pneumococcal Conjugate 13 Valent 4/16/2015    Pneumococcal Polysaccharide Adult 10/24/1995      Problem List as of 3/7/2017     Osteoporosis    Malignant neoplasm of colon, unspecified site    Parkinson disease    Paroxysmal atrial fibrillation    Neurogenic orthostatic hypotension    Osteoarthrosis, unspecified whether generalized or localized, unspecified site    Spinal stenosis, lumbar region, without  neurogenic claudication              Patient Instructions      Please fill out the Power of  for Health Care form and return a copy for our records.    A Power of  for Health Care allows YOU to choose the individual you would want to make decisions for you in the event that you are unable to make them for yourself.  If incapacity occurs and you are without one, it will likely be necessary for your family or others to ask the Court to appoint a guardian-a process that can be costly, time-consuming, and cumbersome.       Please call to schedule your bone density test:  [phone]    [id number] [place] [address]               Medicare Wellness Visit  Plan for Preventive Care    A good way for you to stay healthy is to use preventive care.  Medicare covers many services that can help you stay healthy.* The goal of these services is to find any health problems as quickly as possible. Finding problems early can help make them easier to treat.  Your personal plan below lists the services you may need and when they are due.     Health Maintenance Summary     Topic Due On Due Status Completed On    Immunization - Td/Tdap Feb 6, 1945 Overdue     Immunization-Zoster Feb 6, 1988 Overdue     Immunization - Pneumococcal  Completed Apr 16, 2015    Medicare Wellness Visit Apr 21, 2017 Due Soon Apr 21, 2016    Immunization-Influenza  Completed Oct 19, 2016           Preventive Care for Women and Men    Heart Screenings (Cardiovascular):  · Blood tests are used to check your cholesterol, lipid and triglyceride levels. High levels can increase your risk for heart disease and stroke. High levels can be treated with medications, diet and exercise. Lowering your levels can help keep your heart and blood vessels healthy.  Your provider will order these tests if they are needed.    · An ultrasound is done to see if you have an abdominal aortic aneurysm (AAA).  This is an  enlargement of one of the main blood vessels that delivers blood to the body.   In the [place], 9,000 deaths are caused by AAA.  You may not even know you have this problem and as many as 1 in 3 people will have a serious problem if it is not treated.  Early diagnosis allows for more effective treatment and cure.  If you have a family history of AAA or are a male age 65-75 who has smoked, you are at higher risk of an AAA.  Your provider can order this test, if needed.    Colorectal Screening:  · There are many tests that are used to check for cancer of your colon and rectum. You and your provider should discuss what test is best for you and when to have it done.  Options include:  · Screening Colonoscopy: exam of the entire colon, seen through a flexible lighted tube.  · Flexible Sigmoidoscopy: exam of the last third (sigmoid portion) of the colon and rectum, seen through a flexible lighted tube.  · Cologuard DNA stool test: a sample of your stool is used to screen for cancer and unseen blood in your stool.  · Fecal Occult Blood Test: a sample of your stool is studied to find any unseen blood    Flu Shot:  · An immunization that helps to prevent influenza (the flu). You should get this every year. The best time to get the shot is in the fall.    Pneumococcal Shot:  • Vaccines are available that can help prevent pneumococcal disease, which is any type of infection caused by Streptococcus pneumoniae bacteria.   Their use can prevent some cases of pneumonia, meningitis, and sepsis. There are two types of pneumococcal vaccines:   o Conjugate vaccines (PCV-13 or Prevnar 13®) - helps protect against the 13 types of pneumococcal bacteria that are the most common causes of serious infections in children and adults.    o Polysaccharide vaccine (PPSV23 or Btornvmsz31®) - helps protect against 23 types of pneumococcal bacteria for patients who are recommended to get it.  These vaccines should be given at least 12 months  apart.  A booster is usually not needed.         Hepatitis B Shot:  · An immunization that helps to protect people from getting Hepatitis B. Hepatitis B is a virus that spreads through contact with infected blood or body fluids. Many people with the virus do not have symptoms.  The virus can lead to serious problems, such as liver disease. Some people are at higher risk than others. Your doctor will tell you if you need this shot.     Diabetes Screening:  · A test to measure sugar (glucose) in your blood is called a fasting blood sugar. Fasting means you cannot have food or drink for at least 8 hours before the test. This test can detect diabetes long before you may notice symptoms.    Glaucoma Screening:  · Glaucoma screening is performed by your eye doctor. The test measures the fluid pressure inside your eyes to determine if you have glaucoma.     Hepatitis C Screening:  · A blood test to see if you have the hepatitis C virus.  Hepatitis C attacks the liver and is a major cause of chronic liver disease.  Medicare will cover a single screening for all adults born between 1945 & 1965, or high risk patients (people who have injected illegal drugs or people who have had blood transfusions).  High risk patients who continue to inject illegal drugs can be screened for Hepatitis C every year.    Smoking and Tobacco-Use Cessation Counseling:  · Tobacco is the single greatest cause of disease and early death in our country today. Medication and counseling together can increase a person’s chance of quitting for good.   · Medicare covers two quitting attempts per year, with four counseling sessions per attempt (eight sessions in a 12 month period)    Preventive Screening tests for Women    Screening Mammograms and Breast Exams:  · An x-ray of your breasts to check for breast cancer before you or your doctor may be able to feel it.  If breast cancer is found early it can usually be treated with success.    Pelvic Exams and  Pap Tests:  · An exam to check for cervical and vaginal cancer. A Pap test is a lab test in which cells are taken from your cervix and sent to the lab to look for signs of cervical cancer. If cancer of the cervix is found early, chances for a cure are good. Testing can generally end at age 65, or if a woman has a hysterectomy for a benign condition. Your provider may recommend more frequent testing if certain abnormal results are found.    Bone Mass Measurements:  · A painless x-ray of your bone density to see if you are at risk for a broken bone. Bone density refers to the thickness of bones or how tightly the bone tissue is packed.    Preventive Screening tests for Men    Prostate Screening:  · PSA - Prostate Cancer blood test.  Experts do not recommend routine screening of healthy men with no signs or symptoms of prostate disease.  However, men should not ignore urinary symptoms, and should discuss their family history with their doctor.    *Medicare pays for many preventive services to keep you healthy. For some of these services, you might have to pay a deductible, coinsurance, and / or copayment.  The amounts vary depending on the type of services you need and the kind of Medicare health plan you have.

## 2020-04-30 NOTE — PROGRESS NOTES
[facility]  Progress Note     Dana Alfie Patient Status:  Inpatient    1975 MRN [id number]   [facility] 4SW-A [facility] PCP Anamaria Pena MD     Chief Complaint: SOB    S: intermittently Daily(Beta Blocker)   • spironolactone  25 mg Oral Daily   • chlordiazePOXIDE HCl  10 mg Per NG Tube TID   • QUEtiapine Fumarate  50 mg Per NG Tube Nightly   • enoxaparin  0.5 mg/kg Subcutaneous 2 times per day   • fenofibrate micronized  134 mg Oral Daily As above.      Quality:  · DVT Prophylaxis: Lovenox  · CODE status: FUll  · Best: yes  · Central line: yes    Estimated date of discharge: TBD  Discharge is dependent on: clinical improvement  At this point Mr. Keny Ardon is expected to be discharge to: TBD

## 2020-04-30 NOTE — PHYSICAL THERAPY NOTE
PHYSICAL THERAPY TREATMENT NOTE - INPATIENT    Room Number: 384/384-A     Session: 2   Number of Visits to Meet Established Goals: 6     History related to current admission: Pt is a 41 y.o. male admitted 4/2/20 with dyspnea.  Pt tested positive for COVID1 AM-PAC '6-Clicks' INPATIENT SHORT FORM - BASIC MOBILITY  How much difficulty does the patient currently have. ..  -   Turning over in bed (including adjusting bedclothes, sheets and blankets)?: A Lot   -   Sitting down on and standing up from a leodan Judit and Dr. Miri Pedraza re: left lean after session. Message placed to Dr. Farnaz Dove re: above session. Patient End of Session: In bed;Needs met;Call light within reach;RN aware of session/findings; All patient questions and concerns addressed; Alarm se

## 2020-04-30 NOTE — PLAN OF CARE
Problem: Patient/Family Goals  Goal: Patient/Family Long Term Goal  Description  Patient's Long Term Goal: Discharge home  Interventions:  - Extubated  - Vital signs stable  - Maintain oxygen saturation with little to no supplemental oxygen support  - Se Manage/alleviate anxiety  - Utilize distraction and/or relaxation techniques  - Monitor for opioid side effects  - Notify MD/LIP if interventions unsuccessful or patient reports new pain  - Anticipate increased pain with activity and pre-medicate as approp Monitor vital signs, rhythm, and trends  - Monitor for bleeding, hypotension and signs of decreased cardiac output  - Evaluate effectiveness of vasoactive medications to optimize hemodynamic stability  - Monitor arterial and/or venous puncture sites for bl injections, enemas and rectal medication administration  - Ensure safe mobilization of patient  - Hold pressure on venipuncture sites to achieve adequate hemostasis  - Assess for signs and symptoms of internal bleeding  - Monitor lab trends  - Patient is t ordered  - Assess for signs and symptoms of hyperglycemia and hypoglycemia  - Administer ordered medications to maintain glucose within target range  - Assess barriers to adequate nutritional intake and initiate nutrition consult as needed  - Instruct darius

## 2020-04-30 NOTE — PROGRESS NOTES
[facility]  Report of Psychiatric Progress Note    Jenskatie Alcala Patient Status:  Inpatient    1975 MRN [id number]   [facility] 4SW-A Attending Kimberly Underwood MD   [address] Day # 29 PCP Selene Carlos MD     Date of Admission: 20  Date disorganized at times and disoriented to place and situation. 4/27/20-   He was able to say, \"Oh, that hurt! \" when given the Lovenox. He tracts with his eyes. Follows some commands (makes a face) but not all the time per RN.  He has intermittent psych or following commands. 4/20/20  Per staff, restlessness and agitation persists. He is not following commands. He remains on many sedating meds-- Ativan, dilaudid, methadone, precedex, propofol. Remains intubated.      Past Medical History:   Diagnosis D Hydroxide-Simeth (MAALOX) oral suspension 30 mL, 30 mL, Oral, QID PRN  •  fenofibrate micronized (LOFIBRA) cap 134 mg, 134 mg, Oral, Daily  •  Insulin Aspart Pen (NOVOLOG) 100 UNIT/ML flexpen 4-20 Units, 4-20 Units, Subcutaneous, 4 times per day  •  glucos Judgment: limited    Laboratory Data:  Lab Results   Component Value Date    WBC 13.2 04/30/2020    HGB 9.5 04/30/2020    HCT 28.0 04/30/2020    .0 04/30/2020    CREATSERUM 0.71 04/30/2020    BUN 21 04/30/2020     04/30/2020    K 3.9 04/30/2

## 2020-04-30 NOTE — SLP NOTE
SPEECH DAILY NOTE - INPATIENT    ASSESSMENT & PLAN   ASSESSMENT  Pt seen this AM for reassessment of swallow and dysphagia treatment session. Pt cooperative and alert.  Pt with overall flat affect and withdrawn- pt required encouragement to actively engage intake. Moreover, pt with significantly weak swallow response and overall deconditioned state with s/s of aspiration.      PPE: gown, gloves, face shield, hair bonnet, and P100    Diet Recommendations - Solids: NPO  Diet Recommendations - Liquid: NPO

## 2020-05-01 ENCOUNTER — APPOINTMENT (OUTPATIENT)
Dept: GENERAL RADIOLOGY | Facility: HOSPITAL | Age: 45
DRG: 003 | End: 2020-05-01
Attending: INTERNAL MEDICINE
Payer: COMMERCIAL

## 2020-05-01 PROCEDURE — 99232 SBSQ HOSP IP/OBS MODERATE 35: CPT | Performed by: OTHER

## 2020-05-01 PROCEDURE — 99441 TELEPHONE E&M BY PHYSICIAN EST PT NOT ORIG PREV 7 DAYS 5-10 MIN: CPT | Performed by: INTERNAL MEDICINE

## 2020-05-01 PROCEDURE — 71045 X-RAY EXAM CHEST 1 VIEW: CPT | Performed by: INTERNAL MEDICINE

## 2020-05-01 PROCEDURE — 99232 SBSQ HOSP IP/OBS MODERATE 35: CPT | Performed by: HOSPITALIST

## 2020-05-01 NOTE — PHYSICAL THERAPY NOTE
PHYSICAL THERAPY TREATMENT NOTE - INPATIENT    Room Number: 473/473-A     Session: 3   Number of Visits to Meet Established Goals: 6     History related to current admission: Pt is a 41 y.o. male admitted 4/2/20 with dyspnea.  Pt tested positive for COVID1 -           Static Standing: Dependent  Dynamic Standing: Not tested    ACTIVITY TOLERANCE                         O2 WALK     SPO2 Ambulation on Room Air: 93              AM-PAC '6-Clicks' INPATIENT SHORT FORM - BASIC MOBILITY  How much difficulty does th Patient End of Session: Up in chair;Needs met;Call light within reach;RN aware of session/findings; All patient questions and concerns addressed;SCDs in place; Alarm set    ASSESSMENT   Patient continues to demonstrate deficits in strength, balance, en

## 2020-05-01 NOTE — PLAN OF CARE
Problem: Impaired Swallowing  Goal: Minimize aspiration risk  Description  Interventions:  Puree diet with thin liquids via cup  Feed patient  Awake and alert for po  Upright in chair or bed  Frequent oral care     Outcome: Progressing

## 2020-05-01 NOTE — PLAN OF CARE
Received  AO x 2; off with date and place; reoriented frequently  On RA maintaining sats. Afebrile. BP stable. SR on the monitor. Dobhoff to TF, christiano well with mnimal residual  Incontinent of urine, pericare done.   Transferred on reclining chair with total level and precautions  - Recommend use of total lift for transfers    Outcome: Not Progressing     Problem: Impaired Swallowing  Goal: Minimize aspiration risk  Description  Interventions:  -NPO  -Oral Care   Outcome: Not Progressing

## 2020-05-01 NOTE — PROGRESS NOTES
[facility]  Report of Psychiatric Progress Note    Nelly Kelsey Patient Status:  Inpatient    1975 MRN [id number]   [facility] 4SW-A Attending MD Sy Aponte Day # 34 PCP Natty Petersen MD     Date of Admission: 20  Date more attentive, but still disorganized at times and disoriented to place and situation. 4/27/20-   He was able to say, \"Oh, that hurt! \" when given the Lovenox. He tracts with his eyes. Follows some commands (makes a face) but not all the time per RN. legs). No purposeful movement or following commands. 4/20/20  Per staff, restlessness and agitation persists. He is not following commands. He remains on many sedating meds-- Ativan, dilaudid, methadone, precedex, propofol. Remains intubated.      Past Hydroxide-Simeth (MAALOX) oral suspension 30 mL, 30 mL, Oral, QID PRN  •  fenofibrate micronized (LOFIBRA) cap 134 mg, 134 mg, Oral, Daily  •  Insulin Aspart Pen (NOVOLOG) 100 UNIT/ML flexpen 4-20 Units, 4-20 Units, Subcutaneous, 4 times per day  •  glucos 05/01/2020    CREATSERUM 0.74 05/01/2020    BUN 22 05/01/2020     05/01/2020    K 4.0 05/01/2020     05/01/2020    CO2 27.0 05/01/2020     05/01/2020    CA 9.8 05/01/2020    ALB 3.8 05/01/2020    ALKPHO 44 05/01/2020    BILT 0.6 05/01/20

## 2020-05-01 NOTE — PROGRESS NOTES
[facility]                INFECTIOUS DISEASE PROGRESS NOTE    Dalton Crews Patient Status:  Inpatient    1975 MRN [id number]   [facility] 3SW-A Attending Jojo Burleson MD   Hosp Day # 34 PCP Angela Alvarez MD     Antibiotics: co Growth 5 Days N/A   2. URINE CULTURE, ROUTINE     Status: None    Collection Time: 04/22/20  3:11 PM   Result Value Ref Range    Urine Culture <10,000 CFU/ML Gram negative jayesh N/A         Radiology    cxr 4/23    Problem list reviewed:  Patient Active Pr

## 2020-05-01 NOTE — SLP NOTE
ADULT SWALLOWING EVALUATION    ASSESSMENT    ASSESSMENT/OVERALL IMPRESSION:  Patient contacted at the bedside with RN present with iPad to have sister on iPad to interpret for session. Patient sitting up in the chair. He was awake and alert.  Following one- Administration Recommendations: Crushed in puree  Treatment Plan/Recommendations: Aspiration precautions; Dysphagia therapy(meal monitor)  Discharge Recommendations/Plan: Undetermined    HISTORY   MEDICAL HISTORY  Reason for Referral: R/O aspiration    Prob Impaired  Bolus Retrieval: Intact  Bilabial Seal: Intact  Bolus Formation: Intact  Bolus Propulsion: Impaired  Mastication: Impaired  Retention: Intact    Pharyngeal Phase of Swallow:  Within Functional Limits  Laryngeal Elevation Timing: Appears intact  La

## 2020-05-01 NOTE — CM/SW NOTE
Plan of care reviewed for care coordination. Pt with COVID-19 pneumonia and ARDS, s/p vent support  for 22 days, with safe extubation, on 4/24 on ECMO for 11 days and weaned off on 4/15 with slow but steady cognitive improvement.      Pt was transferred out

## 2020-05-01 NOTE — PLAN OF CARE
Patient normotensive. Afebrile. Remains confused. Oriented to self. Restless at times. More coherent than previous shifts. Following commands. Up in the recliner chair for entire shift. Participated in PT/OT/speech sessions.  Repositioned in chair every 2 h

## 2020-05-01 NOTE — PROGRESS NOTES
Reviewed overnight course with nursing staff. Moved back to ICU for closer nursing care given the severity of his illness and debilitation. Up in chair this am with full assist. Coming around slowly from cognitive standpoint yet making progress.  Has yet

## 2020-05-01 NOTE — PROGRESS NOTES
[facility]  Progress Note    Pauline Ramos Patient Status:  Inpatient    1975 MRN [id number]   [facility] 4SW-A Attending Qamar Aerchiga MD   [address] Day # 34 PCP Christian Camilo MD     Subjective/ Interval hx:  Pauline Ramos is a(n) 41 year GFRNAA 110 114 112   CA 9.3 9.3 9.8   ALB 3.7 3.7 3.8    138 134*   K 3.8 3.9 4.0    105 103   CO2 27.0 27.0 27.0   ALKPHO 40* 40* 44*   AST 21 26 22   ALT 33 49 48   BILT 0.6 0.6 0.6   TP 7.8 8.0 8.4*       Recent Labs   Lab 04/25/20  0421 0 reviewed.     Assessment:  · Acute hypoxic respiratory failure, acute respiratory distress syndrome due to COVID pneumonia, intubated 4/2 (day 13), s/p ECMO 4/4; decannulated 4/14, extubated 4/24  · Shock - likely related to intravascular hypovolemia, medic d/c    Discussed with RN, Hakeem Penaloza, Dr. Reina Larios and Dr. Bubba Altman, [id number] [facility]  05/01/20

## 2020-05-01 NOTE — PLAN OF CARE
Patient received from [facility] shortly before 1500. Alert and calm. Oriented to self only. Frequent reorientation to place, time, and situation provided. Transferred to recliner chair using sit to stand and 2 assist. Tolerated chair for 2 hours.  Remains in pose

## 2020-05-02 PROCEDURE — 99232 SBSQ HOSP IP/OBS MODERATE 35: CPT | Performed by: HOSPITALIST

## 2020-05-02 PROCEDURE — 99231 SBSQ HOSP IP/OBS SF/LOW 25: CPT | Performed by: INTERNAL MEDICINE

## 2020-05-02 NOTE — PLAN OF CARE
VSS. Remains confused but improved. Able to hold conversation. States he is confused about recent events. Oriented to self. Following commands. Up in the recliner chair for entire shift. Able to stand & move to bed side commode. positioned in chair Sister, Problem: CARDIOVASCULAR - ADULT  Goal: Maintains optimal cardiac output and hemodynamic stability  Description  INTERVENTIONS:  - Monitor vital signs, rhythm, and trends  - Monitor for bleeding, hypotension and signs of decreased cardiac output  - Evalua hematoma  - Assess quality of pulses, skin color and temperature  - Assess for signs of decreased coronary artery perfusion - ex.  Angina  - Evaluate fluid balance, assess for edema, trend weights  Outcome: Progressing  Goal: Absence of cardiac arrhythmias Problem: Delirium  Goal: Minimize duration of delirium  Description  Interventions:  - Encourage use of hearing aids, eye glasses  - Promote highest level of mobility daily  - Provide frequent reorientation  - Promote wakefulness i.e. lights on, blinds o

## 2020-05-02 NOTE — PLAN OF CARE
Assumed care of pt. Placed pt in chair with lift. Pt able to discuss a little amount about his work. Seems alert and interactive today. Eating well, being fed. Still receiving tube feeds and in mitt restraints.

## 2020-05-02 NOTE — PROGRESS NOTES
Reviewed overnight course with nursing staff, Dr. Phillip Castrejon and care team.    Afebrile  107/68  89 regular - no significant ventricular ectopy    No bedside exam today - reviewed with nurse - breathing comfortably - soft abdomen and no edema - room air    Passed

## 2020-05-02 NOTE — PROGRESS NOTES
[facility]  Progress Note    Brandy Buenrostro Patient Status:  Inpatient    1975 MRN [id number]   [facility]SW[facility]A [facility] George Tejeda MD     Subjective:  Brandy Buenrostro is a(n) 41year old m  103 102   CO2 27.0 27.0 27.0   ALKPHO 40* 44* 42*   AST 26 22 18   ALT 49 48 42   BILT 0.6 0.6 0.6   TP 8.0 8.4* 7.8       Recent Labs   Lab 04/26/20  0611 04/28/20  0425   MG 2.1 2.3       Lab Results   Component Value Date    PHOS 4.8 04/28/2020 x1 dose, 4/2  · Monitor COVID labs/ inflammatory markers; improved  · Continue tube feeds and bowel regimen; appreciate speech and dietary input.   Weaning tube feeds as oral intake improves  · Follow fluid balance, lytes and urine output, maintain net neut

## 2020-05-02 NOTE — PROGRESS NOTES
[facility]  Progress Note     Denita Carney Patient Status:  Inpatient    1975 MRN [id number]   [facility] 4SW-A Attending [facility] Day # 34 PCP Allen Dillon MD     Chief Complaint: SOB    S: moved back to mg Oral 2x Daily(Beta Blocker)   • pantoprazole (PROTONIX) IV push  40 mg Intravenous Q12H   • spironolactone  25 mg Oral Daily   • QUEtiapine Fumarate  50 mg Per NG Tube Nightly   • enoxaparin  0.5 mg/kg Subcutaneous 2 times per day   • fenofibrate micron yes  · Central line: yes    Estimated date of discharge: TBD  Discharge is dependent on: clinical improvement  At this point Mr. Keny Ardon is expected to be discharge to: TBD    Plan of care discussed with rn, Kyle Booth MD    ADDENDUM:  Per PT, pt

## 2020-05-02 NOTE — PROGRESS NOTES
[facility]  Progress Note     Miguel Hernandez Patient Status:  Inpatient    1975 MRN [id number]   [facility] 4SW-A Attending [facility] Day # 27 PCP González Reid MD     Chief Complaint: SOB    S: moved back to reviewed in Epic.     Medications:   • metoprolol tartrate  25 mg Oral 2x Daily(Beta Blocker)   • pantoprazole (PROTONIX) IV push  40 mg Intravenous Q12H   • spironolactone  25 mg Oral Daily   • QUEtiapine Fumarate  50 mg Per NG Tube Nightly   • enoxaparin dose lovenox 4/14  17. Anemia/Thrombocytopenia  1. Monitor CBC closely  18. 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 Off propofol  19. Malnutrition. Continue tube feeds with speech eval.  1. Passed swallow w/ puree diet w/ thin liquids      Plan of care:  As a

## 2020-05-02 NOTE — PROGRESS NOTES
[facility]                INFECTIOUS DISEASE PROGRESS NOTE    Nelly Kelsey Patient Status:  Inpatient    1975 MRN [id number]   [facility] 3SW-A Attending Stanley Hernández MD   Hosp Day # 27 PCP Natty Petersen MD     Antibiotics: co CULTURE, ROUTINE     Status: None    Collection Time: 04/22/20  3:11 PM   Result Value Ref Range    Urine Culture <10,000 CFU/ML Gram negative jayesh N/A         Radiology    PROCEDURE: XR CHEST AP PORTABLE (CPT=71045)    TECHNIQUE: AP chest radiograph was for now  D/w staff  Will follow    Cedric De La Garza

## 2020-05-02 NOTE — DIETARY NOTE
Clinical Nutrition    Diet advanced to pureed + thins 5/1 after pt passed swallow eval, pt tolerated 50% of 2 ordered meals. Total assist with meals. RD added daily Magic Cup supplement. Calorie count ordered.      5/1:  Lunch: ~171 kcals, 9 grams protein

## 2020-05-02 NOTE — PLAN OF CARE
Received pt alert and oriented to person, reoriented pt to time and place. Pt follows commands. Lungs are diminished bilaterally. On room air with O2 sats 97%. Pt denies shortness of breath. Dobhoff placement checked by auscultation.  Vital AF 1.2 at 65 cc/

## 2020-05-03 PROCEDURE — 99231 SBSQ HOSP IP/OBS SF/LOW 25: CPT | Performed by: INTERNAL MEDICINE

## 2020-05-03 PROCEDURE — 99232 SBSQ HOSP IP/OBS MODERATE 35: CPT | Performed by: HOSPITALIST

## 2020-05-03 NOTE — PLAN OF CARE
Rec'd report from previous RN, care assumed at [address], pt assessed per flow sheets. Pt alert, oriented to person, place, and time, disoriented to situation and fixated on random things. Pt remains in posey vest for safety.   Pt remains on room air, sats grea

## 2020-05-03 NOTE — PROGRESS NOTES
Reviewed overnight course with nursing staff and reviewed with Dr. Abe Menjivar. Slept well. Overall mentation slowly improving. Appetite increasing.  Dobhoff still in place - decreasing tube feeds    Afebrile  101/64  82 regular    Not examined at bedside today

## 2020-05-03 NOTE — PROGRESS NOTES
[facility]  Progress Note     Jada Boggs Patient Status:  Inpatient    1975 MRN [id number]   [facility] 4SW-A Attending [facility] Day # 32 PCP Issa Milton MD     Chief Complaint: SOB    S: rounded on pt mg/dL). No results for input(s): PTP, INR in the last 168 hours. No results for input(s): TROP, CK in the last 168 hours. Imaging: Imaging data reviewed in Epic.     Medications:   • metoprolol tartrate  25 mg Oral 2x Daily(Beta Blocker)   • p Continue tube feeds with speech eval.  1. Passed swallow w/ puree diet w/ thin liquids      Plan of care: As above.  Possible DC to rehab soon     Quality:  · DVT Prophylaxis: Lovenox  · CODE status: FUll  · Best: yes  · Central line: yes    Estimated date

## 2020-05-03 NOTE — PROGRESS NOTES
[facility]  Progress Note    Lilian Mejia Patient Status:  Inpatient    1975 MRN [id number]   [facility]SW[facility]A [facility] PCP Ravindra Torres MD     Subjective:  Lilian Mejia is a(n) 41year old m CA 9.3 9.8 9.3 9.9   ALB 3.7 3.8 3.8  --     134* 135* 134*   K 3.9 4.0 3.7 4.0    103 102 103   CO2 27.0 27.0 27.0 28.0   ALKPHO 40* 44* 42*  --    AST 26 22 18  --    ALT 49 48 42  --    BILT 0.6 0.6 0.6  --    TP 8.0 8.4* 7.8  --        Re evaluation for mastoiditis  · Completed COVID treatment regimen: zithromycin x5 days(started zpak 3/31), hydroxychloroquine x 4 days (stopped 4/5 due to worsening prolonged QT); given tocilizumab x1 dose, 4/2. Repeat COVID PCR in progress.   If negative, w

## 2020-05-03 NOTE — PROGRESS NOTES
Pt A0x3-4. Confusion and forgetfulness at times (though seems to be more related to language barrier). Maintaining 02 sats on RA. VSS. Tolerating PO intake. Able to feed self modified diet after set up. DHT d/c'ed per Dr. David cary.  Seen by speech and ok'ed f

## 2020-05-03 NOTE — SLP NOTE
SPEECH DAILY NOTE - INPATIENT    ASSESSMENT & PLAN   ASSESSMENT  Pt seen for dysphagia tx to assess tolerance with recommended diet, ensure proper utilization of aspiration precautions and provide pt/family education.   RN contacted this writer as patient c over 1-2 session(s). In Progress  Updated 5/3   Goal #2 The patient/family/caregiver will demonstrate understanding and implementation of aspiration precautions and swallow strategies independently over 2 session(s).      In Progress   Goal #3 The patient

## 2020-05-04 PROCEDURE — 99232 SBSQ HOSP IP/OBS MODERATE 35: CPT | Performed by: HOSPITALIST

## 2020-05-04 PROCEDURE — 99441 TELEPHONE E&M BY PHYSICIAN EST PT NOT ORIG PREV 7 DAYS 5-10 MIN: CPT | Performed by: INTERNAL MEDICINE

## 2020-05-04 RX ORDER — METOPROLOL SUCCINATE 25 MG/1
25 TABLET, EXTENDED RELEASE ORAL DAILY
Status: DISCONTINUED | OUTPATIENT
Start: 2020-05-05 | End: 2020-05-05

## 2020-05-04 NOTE — OCCUPATIONAL THERAPY NOTE
OCCUPATIONAL THERAPY TREATMENT NOTE - INPATIENT     Room Number: 473/473-A  Session: 3   Number of Visits to Meet Established Goals: 10    Presenting Problem: +COVID-19, PNA, ARDS, respiratory failure     History related to current admission: Pt is 45 year washing, rinsing, drying)?: A Little  -   Toileting, which includes using toilet, bedpan or urinal? : A Little  -   Putting on and taking off regular upper body clothing?: A Little  -   Taking care of personal grooming such as brushing teeth?: A Little  - reorientation;Patient/Family education;Patient/Family training;Equipment eval/education; Compensatory technique education;Continued evaluation  Rehab Potential : Good  Frequency (Obs): 5x/week      OT Goals:   ADL Goals   Patient will perform 72 [phone] 20 53

## 2020-05-04 NOTE — PROGRESS NOTES
[facility]  Progress Note    Radha Day Patient Status:  Inpatient    1975 MRN [id number]   [facility] 4SW-A Attending Silverio [address] [place] [address] Day # 28 PCP Oscar Cid MD     Subjective:  Radha Day is a(n) 41year old m 3.8 3.8  --  3.9   * 135* 134* 137   K 4.0 3.7 4.0 3.8    102 103 105   CO2 27.0 27.0 28.0 24.0   ALKPHO 44* 42*  --  41*   AST 22 18  --  14*   ALT 48 42  --  30   BILT 0.6 0.6  --  0.6   TP 8.4* 7.8  --  7.9       Recent Labs   Lab 04/28/20 Donzelli- unlikely acute mastoiditis as hearing is intact and no facial pain on palpation.   · Completed COVID treatment regimen: zithromycin x5 days(started zpak 3/31), hydroxychloroquine x 4 days (stopped 4/5 due to worsening prolonged QT); given tocilizu

## 2020-05-04 NOTE — DIETARY NOTE
[facility] ASSESSMENT    Pt does not meet malnutrition criteria.     NUTRITION DIAGNOSIS/PROBLEM:  Inadequate oral intake related to physiological causes as evidenced by previous vent status, intubated, sedated, prone and need fo Remains intubated on 30% FiO2, PEEP 5, sedated on versed, ketamine, weaned off levo, remains on ECMO. On paralytics. Pt tolerating TPN. Inflammatory markers improving. NG output 250 ml over 24 hours; improving.  On scheduled reglan, consider starting trickl 5/8    Yens Stanley MS, RD, [address],[facility] 200 Dietitian  Pager # [phone]

## 2020-05-04 NOTE — SLP NOTE
SPEECH DAILY NOTE - INPATIENT    ASSESSMENT & PLAN   ASSESSMENT  Pt seen for dysphagia tx to assess tolerance with recommended diet, ensure proper utilization of aspiration precautions and provide pt/family education.  Patient seen with recommended diet fro GOALS  Goal #1 The patient will tolerate mechanical soft chopped consistency and thin liquids without overt signs or symptoms of aspiration with 95 % accuracy over 1-2 session(s).  Met   Goal #2 The patient/family/caregiver will demonstrate understanding

## 2020-05-04 NOTE — PROGRESS NOTES
[facility]                INFECTIOUS DISEASE PROGRESS NOTE    Tomasa Vázquez Patient Status:  Inpatient    1975 MRN [id number]   [facility] 3SW-A Attending Pretty Willingham MD   Hosp Day # 28 PCP Josué Louie MD     Antibiotics: co 1.  BLOOD CULTURE     Status: None    Collection Time: 04/25/20  9:05 AM   Result Value Ref Range    Blood Culture Result No Growth 5 Days N/A   2. URINE CULTURE, ROUTINE     Status: None    Collection Time: 04/22/20  3:11 PM   Result Value Ref Range    U

## 2020-05-04 NOTE — PLAN OF CARE
Assumed care for pt around 1930. Pt resting in bed talking to family via facetime. Alert and oriented x4, forgetful at times and can be slow to respond, likely d/t language barrier. Plan is to d/c to [place], awaiting insurance authorization.  Will isaac

## 2020-05-04 NOTE — CM/SW NOTE
Pt is medically cleared for discharge today and we have insurance authorization for acute rehab to [facility] (per Kylee Michael at [facility]). However, pt will need to be 24 hour restraints/mitts free in order for [facility] to accept pt.  [facility]

## 2020-05-04 NOTE — PROGRESS NOTES
[facility]  Progress Note     Espinoza Cardoza Patient Status:  Inpatient    1975 MRN [id number]   [facility] 4SW-A Attending Sukhjinder [address] Day # 28 PCP Josh Mae MD     Chief Complaint: SOB    S: pt seen and ex 137   K 4.0 3.7 4.0 3.8    102 103 105   CO2 27.0 27.0 28.0 24.0   ALKPHO 44* 42*  --  41*   AST 22 18  --  14*   ALT 48 42  --  30   BILT 0.6 0.6  --  0.6   TP 8.4* 7.8  --  7.9       Estimated Creatinine Clearance: 94 mL/min (based on SCr of 0.84 m monitor  13. HTN  14. DL  15. 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 Transitioning from heparin gtt to Full dose lovenox 4/14  17. Anemia/Thrombocytopenia  1. Monitor CBC closely  18. 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 Off propofol  19.  Murl Drown

## 2020-05-04 NOTE — PAYOR COMM NOTE
--------------  CONTINUED STAY REVIEW    Payor: [address] #:  [id number]  Authorization Number: [id number]            5/3 CARDS    Reviewed overnight course with nursing staff and reviewed with Dr. David Bonilla.     Slept well. lytes and urine output, maintain net neutral fluid balance.   · Transfuse for hgb <7  · Ppx: SCDs, H2B, LMWH intermediate dose  · Dispo: ICU. Continue PT/OT. Discharge planning- Raheel Whitley.   Spoke w FLAVIO and pt's sister Mauro Lr).                 5/3 HOSP had a mild L lean a few days ago  2. CTH unremarkable  3. Doubt stroke, no signs of facial droop or weakness  6. Elevated Troponin with EKG changes  1.  ECHO shows no RWMA with hyperdynamic EF, no further cardiac workup planned  2. 4/13 repeat ECHO, no maynard Sodium Chloride 0.9 % 10 mL IV push     Date Action Dose Route User    5/4/2020 0855 Given 40 mg Intravenous Abbie Runner, RN    5/3/2020 2000 Given 40 mg Intravenous Xiang Greenberg RN      QUEtiapine Fumarate (SEROQUEL) tab 50 mg     Date Action Ankita Jaeger

## 2020-05-04 NOTE — PROGRESS NOTES
Reviewed with nursing staff and discussed with Dr. Farnaz White and Dr. Zohra Silverio    No baseline exam yet seen through glass and reviewed with care team - up in chair - alert - exercising arms - waved hello    Adamri  108/72  90 regular    Eating and working with

## 2020-05-04 NOTE — PHYSICAL THERAPY NOTE
PHYSICAL THERAPY TREATMENT NOTE - INPATIENT    Room Number: 473/473-A     Session: 4   Number of Visits to Meet Established Goals: 6     History related to current admission: Pt is a 41 y.o. male admitted 4/2/20 with dyspnea.  Pt tested positive for COVID1 Static Standing: Poor +  Dynamic Standing: Poor +    ACTIVITY TOLERANCE                         O2 WALK                    AM-PAC '6-Clicks' INPATIENT SHORT FORM - BASIC MOBILITY  How much difficulty does the patient currently have. ..  -   Turning over i addressed; Discussed recommendations with /    ASSESSMENT   Patient continues to demonstrate deficits in strength, balance, endurance, mobility and functional independence, and will benefit from continued skilled PT.   Pt libra mann

## 2020-05-04 NOTE — PLAN OF CARE
Report received on patient. Patient is A&Ox4. Able to follow commands and instructions. Hemodynamics remain stable. Restraints have remained off since 11:30am on 5/3/2020. Patient ambulated to bathroom with one assist, tolerated well.  Patient facetimin

## 2020-05-05 VITALS
WEIGHT: 142.88 LBS | DIASTOLIC BLOOD PRESSURE: 81 MMHG | RESPIRATION RATE: 23 BRPM | SYSTOLIC BLOOD PRESSURE: 125 MMHG | TEMPERATURE: 99 F | BODY MASS INDEX: 24.39 KG/M2 | OXYGEN SATURATION: 98 % | HEIGHT: 64 IN | HEART RATE: 99 BPM

## 2020-05-05 PROCEDURE — 99239 HOSP IP/OBS DSCHRG MGMT >30: CPT | Performed by: HOSPITALIST

## 2020-05-05 RX ORDER — ALBUTEROL SULFATE 90 UG/1
2 AEROSOL, METERED RESPIRATORY (INHALATION) EVERY 4 HOURS PRN
Qty: 1 INHALER | Refills: 0 | Status: SHIPPED | OUTPATIENT
Start: 2020-05-05 | End: 2020-06-04

## 2020-05-05 RX ORDER — FENOFIBRATE 134 MG/1
134 CAPSULE ORAL DAILY
Qty: 30 CAPSULE | Refills: 0 | Status: SHIPPED | OUTPATIENT
Start: 2020-05-06 | End: 2020-06-16

## 2020-05-05 RX ORDER — METOPROLOL SUCCINATE 25 MG/1
25 TABLET, EXTENDED RELEASE ORAL DAILY
Qty: 30 TABLET | Refills: 0 | Status: SHIPPED | OUTPATIENT
Start: 2020-05-06 | End: 2020-05-18

## 2020-05-05 RX ORDER — QUETIAPINE 50 MG/1
50 TABLET, FILM COATED ORAL NIGHTLY
Qty: 30 TABLET | Refills: 0 | Status: SHIPPED | OUTPATIENT
Start: 2020-05-05 | End: 2020-05-18

## 2020-05-05 NOTE — PLAN OF CARE
Assumed care for this pt at [address] [place] [address]. Pt alert and oriented sitting in chair facetiming with family. Pt denies any questions. VSS on room air sats in upper 90's to 100%. Denies any pain plan for pt to go to [facility] when all criteria met.  Pt sinus rhythm on Consider OT/PT consult to assist with strengthening/mobility  - Encourage toileting schedule  Outcome: Progressing     Problem: DISCHARGE PLANNING  Goal: Discharge to home or other facility with appropriate resources  Description  INTERVENTIONS:  - Identif arrhythmias  - Monitor electrolytes and administer replacement therapy as ordered  Outcome: Progressing     Problem: METABOLIC/FLUID AND ELECTROLYTES - ADULT  Goal: Electrolytes maintained within normal limits  Description  INTERVENTIONS:  - Monitor labs a Minimize duration of delirium  Description  Interventions:  - Encourage use of hearing aids, eye glasses  - Promote highest level of mobility daily  - Provide frequent reorientation  - Promote wakefulness i.e. lights on, blinds open  - Promote sleep, encou slow rate  - Encourage small bites of food and small sips of liquid  - Offer pills one at a time, crush or deliver with applesauce as needed  - Discontinue feeding and notify MD (or speech pathologist) if coughing or persistent throat clearing or wet/gurgl

## 2020-05-05 NOTE — PROGRESS NOTES
Received call from [facility] lab that 5/4 COVID test came back positive. Marie Cabezas notified of test results.

## 2020-05-05 NOTE — PROGRESS NOTES
[facility]  Progress Note    Jada Boggs Patient Status:  Inpatient    1975 MRN [id number]   [facility] 4SW-A Attending [facility] Day # 35 PCP Issa Milton MD     Subjective:  Jada Boggs is a(n) 41year old m Data Review:  Recent Labs   Lab 05/03/20  0434 05/04/20  0439 05/05/20  0448   WBC 11.4* 10.0 8.9   HGB 10.9* 10.2* 10.4*   HCT 32.4* 29.9* 31.2*   .0* 476.0* 450.0       Recent Labs   Lab 05/02/20  0502 05/03/20  0434 05/04/20  0439 05/05/20  0448 improved  · Hyponatremia- resolved  · Elevated inflammatory markers - due to above  · transaminitis- resolved  · Hypertriglyceridemia- due to propofol, off  · Diarrhea likely viral infection related; improved  · nonanion gap metabolic acidosis likely relat

## 2020-05-05 NOTE — PROGRESS NOTES
Pt A&O x 4. Follows commands. Engaged in conversation. VSS. Eating 100% of meals. Denies pain. Report called to [facility] ARMANDO HART at [place]. Transport scheduled for 1300. Pt and family updated on transfer.

## 2020-05-05 NOTE — CM/SW NOTE
05/05/20 1112   Discharge disposition   Expected discharge disposition Rehab [facility] Giselle Mariscal   Name of Carloz 224   Discharge transportation Wade Olvera received from Millicent at [facility], they can accept patient for admit today,

## 2020-05-06 NOTE — DISCHARGE SUMMARY
[facility]  DISCHARGE SUMMARY     Gilberto Canas Patient Status:  Inpatient    1975 MRN [id number]   [facility] 4SW-A Attending No att. providers found   [facility] Day # 35 PCP Kianna Duenas MD     Date of Admission: 2020  Date of Shantel Glover Microangiopathy  1. Transitioning from heparin gtt to Full dose lovenox 4/14  17. Anemia/Thrombocytopenia  1. Monitor CBC closely  18. 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 Off propofol  19. Malnutrition.    1. Passed swallow w/ puree diet w/ thin liquids 6, 2020      Take 1 capsule (134 mg total) by mouth daily. Quantity:  30 capsule  Refills:  0     Metoprolol Succinate ER 25 MG Tb24  Commonly known as: Toprol XL  Start taking on: May 6, 2020      Take 1 tablet (25 mg total) by mouth daily.    Quantity as possible for a visit        Vital signs:  Temp:  [97.2 °F (36.2 °C)-98.9 °F (37.2 °C)] 98.9 °F (37.2 °C)  Pulse:  [] 99  Resp:  [10-23] 23  BP: (112-125)/(76-89) 125/81    Physical Exam:    General: No acute distress.    Respiratory: Clear to auscu

## 2020-05-09 NOTE — PAYOR COMM NOTE
--------------  DISCHARGE REVIEW    Payor: [address] #:  [id number]  Authorization Number: [id number]    Admit date: 4/2/20  Admit time:  8242  Discharge Date: 5/5/2020  1:30 PM     Admitting Physician: Federico Ruiz temps  1. ceft -> zosyn on 4/2 -> Meropenem started 4/10. completed  2. Vancomycin started 4/10 empirically as well, now held, ID following  3. Cultures re-drawn 4/10 and also negative  10. Leukocytosis has normalized  11. Prolonged QTC  1. Monitor  2.  Jacek Bertrand · none    Consultants:  • ID, pulm, cards    Discharge Medication List:     Discharge Medications      START taking these medications      Instructions Prescription details   apixaban 2.5 MG Tabs  Commonly known as:  ELIQUIS      Take 1 tablet (2.5 mg to these medications  · Albuterol Sulfate  (90 Base) MCG/ACT Aers  · Metoprolol Succinate ER 25 MG Tb24  · QUEtiapine Fumarate 50 MG Tabs         [place]    Follow-up appointment:   Dangelo Hall MD  [address] [place] [address]

## 2020-05-15 ENCOUNTER — TELEPHONE (OUTPATIENT)
Dept: INTERNAL MEDICINE CLINIC | Facility: CLINIC | Age: 45
End: 2020-05-15

## 2020-05-15 NOTE — TELEPHONE ENCOUNTER
Pt has a physical scheduled for 5/22/20. We called to reschedule due to Danae. Is pt still in [place]? We were holding spot to possibly convert to HFU. Please advise.

## 2020-05-18 ENCOUNTER — TELEMEDICINE (OUTPATIENT)
Dept: INTERNAL MEDICINE CLINIC | Facility: CLINIC | Age: 45
End: 2020-05-18
Payer: COMMERCIAL

## 2020-05-18 ENCOUNTER — TELEPHONE (OUTPATIENT)
Dept: INTERNAL MEDICINE CLINIC | Facility: CLINIC | Age: 45
End: 2020-05-18

## 2020-05-18 ENCOUNTER — PATIENT OUTREACH (OUTPATIENT)
Dept: CASE MANAGEMENT | Age: 45
End: 2020-05-18

## 2020-05-18 DIAGNOSIS — J80 ACUTE RESPIRATORY DISTRESS SYNDROME (ARDS) DUE TO COVID-19 VIRUS (HCC): Primary | ICD-10-CM

## 2020-05-18 DIAGNOSIS — M31.10 THROMBOTIC MICROANGIOPATHY (HCC): ICD-10-CM

## 2020-05-18 DIAGNOSIS — Z02.9 ENCOUNTERS FOR UNSPECIFIED ADMINISTRATIVE PURPOSE: ICD-10-CM

## 2020-05-18 DIAGNOSIS — M53.3 TAIL BONE PAIN: ICD-10-CM

## 2020-05-18 DIAGNOSIS — G47.00 INSOMNIA, UNSPECIFIED TYPE: ICD-10-CM

## 2020-05-18 DIAGNOSIS — I10 BENIGN ESSENTIAL HTN: ICD-10-CM

## 2020-05-18 DIAGNOSIS — U07.1 ACUTE RESPIRATORY DISTRESS SYNDROME (ARDS) DUE TO COVID-19 VIRUS (HCC): Primary | ICD-10-CM

## 2020-05-18 DIAGNOSIS — E78.5 HYPERLIPIDEMIA, UNSPECIFIED HYPERLIPIDEMIA TYPE: ICD-10-CM

## 2020-05-18 PROCEDURE — 99495 TRANSJ CARE MGMT MOD F2F 14D: CPT | Performed by: INTERNAL MEDICINE

## 2020-05-18 RX ORDER — METOPROLOL SUCCINATE 25 MG/1
TABLET, EXTENDED RELEASE ORAL
COMMUNITY
Start: 2020-05-18 | End: 2020-06-16

## 2020-05-18 NOTE — PROGRESS NOTES
Pt d/c from [facility] 5/15/2020. Pt had televisit with PCP today. Encounter closing.

## 2020-05-18 NOTE — TELEPHONE ENCOUNTER
MD Kina Del Castillo LPN   Caller: Unspecified (3 days ago,  3:59 PM)             I am not sure if he is still at [facility]. I have not received any records. Please find out from his family.      Joi Whyte Alert and oriented to person, place and time

## 2020-05-18 NOTE — TELEPHONE ENCOUNTER
Doximity Video Visit Consent    Jada Boggs verbally consents to a Doximity Video Visit Check-In service on 5/18/20  Patient understands that we are using a different platform that may not be as secure as our traditional telehealth platform.  Patient was ashlee

## 2020-05-18 NOTE — PROGRESS NOTES
[facility]    CHIEF COMPLAINT:  Patient presents with:  TCM (Transition Of Care Management)        HISTORY OF PRESENT ILLNESS:  Video visit done today due to coronavirus pandemic. Pt's sister present on video call as well.    This is a TCM hfu for 4/14  17. Anemia/Thrombocytopenia  1. Monitor CBC closely  18. 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 Off propofol  19. Malnutrition.    1. Passed swallow w/ puree diet w/ thin liquids     History of Present Illness: Vaishali Almanza a 41year old male with a p the propofol was discontinued. He is currently on fenofibrate. Tolerating well. 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 Was on heparin during the course of his hospitalization. Now switched over to eliquis.  He is to continue this for about 6 CO2 21.0 21.0 - 32.0 mmol/L    Anion Gap 8 0 - 18 mmol/L    BUN 11 7 - 18 mg/dL    Creatinine 1.16 0.70 - 1.30 mg/dL    BUN/CREA Ratio 9.5 (L) 10.0 - 20.0    Calcium, Total 8.3 (L) 8.5 - 10.1 mg/dL    Calculated Osmolality 273 (L) 275 - 295 mOsm/kg    G PEEP 10.0 cm H2O   TRIGLYCERIDES   Result Value Ref Range    Triglycerides 147 30 - 149 mg/dL   C-REACTIVE PROTEIN   Result Value Ref Range    C-Reactive Protein 10.20 (H) <0.30 mg/dL   PRO BETA NATRIURETIC PEPTIDE   Result Value Ref Range    Pro-Beta Natr - 10.1 mg/dL    Calculated Osmolality 284 275 - 295 mOsm/kg    GFR, Non- 85 >=60    GFR, -American 98 >=99   COMP METABOLIC PANEL (14)   Result Value Ref Range    Glucose 132 (H) 70 - 99 mg/dL    Sodium 139 136 - 145 mmol/L    Potass Cont     FIO2 60 %    Vent Rate 24 /min    Tidal Volume 400 mL    PEEP 15.0 cm H2O   FERRITIN   Result Value Ref Range    Ferritin 3,087.2 (H) 30.0 - 490.0 ng/mL   ABG PANEL W ELECT AND LACTATE   Result Value Ref Range    ABG pH 7.31 (L) 7.35 - 7.45    ABG Value Ref Range    ABG pH 7.21 (L) 7.35 - 7.45    ABG pCO2 51 (H) 35 - 45 mm Hg    ABG pO2 74 (L) 80 - 105 mm Hg    ABG HCO3 19.5 (L) 22.0 - 26.0 mEq/L    ABG Base Excess -7.6 (L) -2.0 - 2.0 mmol/L    ABG O2 Saturation 87 (L) 92 - 100 %    Calculated O2 Sa BUN/CREA Ratio 8.4 (L) 10.0 - 20.0    Calcium, Total 7.0 (L) 8.5 - 10.1 mg/dL    Calculated Osmolality 305 (H) 275 - 295 mOsm/kg    GFR, Non- 107 >=60    GFR, -American 124 >=60     (H) 15 - 37 U/L    ALT 40 16 - 61 U/L    Alk Saturation 91 (L) 92 - 100 %    Calculated O2 Saturation 90 (L) 92 - 100 %    Patient Temperature 99.3 F    Total Hemoglobin 11.4 (L) 13.2 - 17.3 g/dL    Carboxyhemoglobin 1.6 0.0 - 3.0 % SAT    Methemoglobin 0.7 0.4 - 1.5 % SAT    P/F Ratio 99.6 mmHg    A American 96 >=60    GFR, -American 111 >=60   ABG PANEL W ELECT AND LACTATE   Result Value Ref Range    ABG pH 7.33 (L) 7.35 - 7.45    ABG pCO2 46 (H) 35 - 45 mm Hg    ABG pO2 63 (L) 80 - 105 mm Hg    ABG HCO3 24.1 22.0 - 26.0 mEq/L    ABG Base Exce Non- 102 >=60    GFR, -American 118 >=60     (H) 15 - 37 U/L    ALT 32 16 - 61 U/L    Alkaline Phosphatase 37 (L) 45 - 117 U/L    Bilirubin, Total 1.2 0.1 - 2.0 mg/dL    Total Protein 5.2 (L) 6.4 - 8.2 g/dL    Albumin 2.7 (L) Range    Slide Review Slide reviewed, see previous RBC morphology.     BASIC METABOLIC PANEL (8)   Result Value Ref Range    Glucose 174 (H) 70 - 99 mg/dL    Sodium 139 136 - 145 mmol/L    Potassium 4.3 3.5 - 5.1 mmol/L    Chloride 110 98 - 112 mmol/L    CO Absolute Manual 13.73 (H) 1.50 - 7.70 x10(3) uL    Lymphocyte Absolute Manual 2.29 1.00 - 4.00 x10(3) uL    Monocyte Absolute Manual 0.70 0.10 - 1.00 x10(3) uL    Eosinophil Absolute Manual 0.88 (H) 0.00 - 0.70 x10(3) uL    Basophil Absolute Manual 0.00 0. 7 - 18 mg/dL    Creatinine 1.06 0.70 - 1.30 mg/dL    BUN/CREA Ratio 4.7 (L) 10.0 - 20.0    Calcium, Total 6.6 (L) 8.5 - 10.1 mg/dL    Calculated Osmolality 288 275 - 295 mOsm/kg    GFR, Non- 85 >=60    GFR, -American 98 >=60    AST 3 Negative    pH Urine 5.0 4.5 - 8.0    Protein Urine Negative Negative mg/dl    Urobilinogen Urine <2.0 0.2 - 2.0 mg/dL    Nitrite Urine Negative Negative    Leukocyte Esterase Urine Negative Negative   ARTERIAL BLOOD GAS   Result Value Ref Range    ABG pH Value Ref Range    Magnesium 2.3 1.6 - 2.6 mg/dL   PHOSPHORUS   Result Value Ref Range    Phosphorus 2.6 2.5 - 4.9 mg/dL   PTT, ACTIVATED   Result Value Ref Range    PTT 54.9 (H) 25.4 - 36.1 seconds   MANUAL DIFFERENTIAL   Result Value Ref Range    Neutrop Applicable     Sample Site Arterial Line     ABG Device      ABG Vent Mode Volume Control     FIO2 40 %    Vent Rate 24 /min    Tidal Volume 300 mL    PEEP 8.0 cm H2O    Potassium Blood Gas 4.1 3.6 - 5.1 mmol/L    Sodium Blood Gas 145 (H) 136 - 144 mm A/G Ratio 0.6 (L) 1.0 - 2.0   MAGNESIUM   Result Value Ref Range    Magnesium 2.4 1.6 - 2.6 mg/dL   PHOSPHORUS   Result Value Ref Range    Phosphorus 2.4 (L) 2.5 - 4.9 mg/dL   D-DIMER   Result Value Ref Range    D-Dimer 4.90 (H) <0.50 ug/mL FEU   MANUAL 7. 45    ABG pCO2 46 (H) 35 - 45 mm Hg    ABG pO2 96 80 - 105 mm Hg    ABG HCO3 29.9 (H) 22.0 - 26.0 mEq/L    ABG Base Excess 5.3 (H) -2.0 - 2.0 mmol/L    ABG O2 Saturation 94 92 - 100 %    Calculated O2 Saturation 98 92 - 100 %    Patient Temperature 99.7 Result Value Ref Range    Neutrophil Absolute Manual 16.87 (H) 1.50 - 7.70 x10(3) uL    Lymphocyte Absolute Manual 3.62 1.00 - 4.00 x10(3) uL    Monocyte Absolute Manual 1.69 (H) 0.10 - 1.00 x10(3) uL    Eosinophil Absolute Manual 0.00 0.00 - 0.70 x10(3) (H) 22.0 - 26.0 mEq/L    ABG Base Excess 5.7 (H) -2.0 - 2.0 mmol/L    ABG O2 Saturation 94 92 - 100 %    Calculated O2 Saturation 98 92 - 100 %    Patient Temperature 99.9 F    Total Hemoglobin 9.1 (L) 13.2 - 17.3 g/dL    Carboxyhemoglobin 2.5 0.0 - 3.0 % Non- 104 >=60    GFR, -American 120 >=60     (H) 15 - 37 U/L    ALT 55 16 - 61 U/L    Alkaline Phosphatase 58 45 - 117 U/L    Bilirubin, Total 1.4 0.1 - 2.0 mg/dL    Total Protein 5.8 (L) 6.4 - 8.2 g/dL    Albumin 2.3 (L) 3.4 forms with rare plasmacytoid lymphocytes. Normocytic anemia with polychromasia and circulating nucleated red blood cells. Thrombocytopenia. Reviewed by Odalys Sanabria M.D.  Pathology 04/13/20 at 10:06 AM     ARTERIAL BLOOD GAS   Result Value Ref Range 490.0 ng/mL   C-REACTIVE PROTEIN   Result Value Ref Range    C-Reactive Protein 0.69 (H) <0.30 mg/dL   LDH   Result Value Ref Range    LDH 1,391 (H) 87 - 241 U/L   D-DIMER   Result Value Ref Range    D-Dimer 6.59 (H) <0.50 ug/mL FEU   MANUAL DIFFERENTIAL Metoprolol Succinate ER 25 MG Oral Tablet 24 Hr; Take 50mg in the am, 25mg in the pm.    3. Hyperlipidemia, unspecified hyperlipidemia type  Continue fenofibrate. 4. Thrombotic microangiopathy (HCC)  Continue eliquis for 6 weeks.      5. Insomnia, unspe

## 2020-05-27 ENCOUNTER — TELEPHONE (OUTPATIENT)
Dept: INTERNAL MEDICINE CLINIC | Facility: CLINIC | Age: 45
End: 2020-05-27

## 2020-05-27 NOTE — TELEPHONE ENCOUNTER
Spoke to [employer], RN from [facility]. States pt no longer has s/s of COVID such as fever, cough, sore throat, sob. States pt was COVID+ in the beginning of April  Pt just got home about a week ago  Okay for verbal order to DC isolation precautions?

## 2020-05-27 NOTE — TELEPHONE ENCOUNTER
Abraham, nurse with [employer], is requesting a verbal ok to release patient of isolation precautions. Please advise. Thank you!

## 2020-05-28 ENCOUNTER — TELEPHONE (OUTPATIENT)
Dept: INTERNAL MEDICINE CLINIC | Facility: CLINIC | Age: 45
End: 2020-05-28

## 2020-05-28 NOTE — TELEPHONE ENCOUNTER
Based on cdc guidelines he can discontinue isolation 10 days after his positive test, as long as symptoms have resolved and he has no fever. Pt has met these criteria.  Okay to discontinue isolation, however he should still be following the cdc protocol for

## 2020-05-28 NOTE — TELEPHONE ENCOUNTER
Left detailed msg on confidential voicemail of Abraham nurse at [facility], advising if pt symptoms have indeed resolved and no fever for several days, may d/c isolation.  Pt to follow cdc guidelines for social distancing and wear mask when aroun

## 2020-05-29 NOTE — TELEPHONE ENCOUNTER
Received long term disability paperwork. Left message for pt to call back to see when patient is planning on returning to work. Is this to be back dated from when he was hospitalized initially?

## 2020-05-29 NOTE — TELEPHONE ENCOUNTER
Patient does not know either of the dates requested and does not understand the paperwork. He is not sure what date to put for the start of disability. He said he thought Dr Tammy Morataya would tell him when he can return to work.

## 2020-06-01 NOTE — TELEPHONE ENCOUNTER
Spoke with pt, Pt states his starting disability date, when he stopped working, would have been 3/31/20. Advised per Dr Taina Trevino, can return to work now.    Pt states that Dr Blanquita Gale at [facility] said he needed antibody test first, 40 days out before he ca

## 2020-06-04 NOTE — TELEPHONE ENCOUNTER
Pts work called to see how much longer until this is completed, concerned because Pts insurance will lapse if not done soon.  No call back needed just Miguel Ángel Copeland

## 2020-06-05 NOTE — TELEPHONE ENCOUNTER
Spoke with dr. Dagmar Carlos. He does not need antibody testing. His symptoms have completely resolved. He can go back to work. Okay to fill out forms. Please fill out.

## 2020-06-05 NOTE — TELEPHONE ENCOUNTER
No need for antibody testing to determine whether he can go back to work. He meets the cdc criteria to go back to work. I have paged dr. Mateus George to see if he has any further recommendations. Will fill out paperwork once I speak with him.    When does the i

## 2020-06-08 NOTE — TELEPHONE ENCOUNTER
Dr Kosta Sue note below - noted. To advise pt. Form completed as best able with multiple questions. Added, where appropriate HFU note, med list, and treatment plan. Notes on each page as appropriate for Dr Kosta Sue review.     Care Everywhere would not ful

## 2020-06-09 NOTE — TELEPHONE ENCOUNTER
Spoke to pt. Informed that Dr. Kelley Calrk spoke with Dr. Mateus George  & that does not need antibody test & can return to work. Obtained pt's Employer Name & SS#, filled out on respective lines on form. Pt requests original to be mailed to him.

## 2020-06-09 NOTE — TELEPHONE ENCOUNTER
Lm for pt to call back. Disability paperwork completed. Dr Mauricio Alexander spoke with Dr Kris Culver, he does NOT need antibody testing; can return to work. Form is completed, however, pg 1 does not state employers name or SS#.  Does he need these added prior to us fa

## 2020-06-09 NOTE — TELEPHONE ENCOUNTER
Form faxed back. Copy to scan - did not include those records already in epic. Original paperwork mailed to pt address on file.

## 2020-06-12 NOTE — TELEPHONE ENCOUNTER
Noted forms are duplicates. Forms already completed & faxed back to [facility]. See Kendy's note below. Completed original forms mailed to pt.  & copy faxed & sent to scanning.

## 2020-06-16 ENCOUNTER — TELEPHONE (OUTPATIENT)
Dept: INTERNAL MEDICINE CLINIC | Facility: CLINIC | Age: 45
End: 2020-06-16

## 2020-06-16 DIAGNOSIS — I10 BENIGN ESSENTIAL HTN: ICD-10-CM

## 2020-06-16 RX ORDER — METOPROLOL SUCCINATE 25 MG/1
TABLET, EXTENDED RELEASE ORAL
Qty: 270 TABLET | Refills: 0 | OUTPATIENT
Start: 2020-06-16

## 2020-06-17 ENCOUNTER — TELEMEDICINE (OUTPATIENT)
Dept: INTERNAL MEDICINE CLINIC | Facility: CLINIC | Age: 45
End: 2020-06-17
Payer: COMMERCIAL

## 2020-06-17 DIAGNOSIS — E78.5 HYPERLIPIDEMIA, UNSPECIFIED HYPERLIPIDEMIA TYPE: ICD-10-CM

## 2020-06-17 DIAGNOSIS — I10 BENIGN ESSENTIAL HTN: ICD-10-CM

## 2020-06-17 DIAGNOSIS — M31.10 THROMBOTIC MICROANGIOPATHY (HCC): ICD-10-CM

## 2020-06-17 PROCEDURE — 99214 OFFICE O/P EST MOD 30 MIN: CPT | Performed by: INTERNAL MEDICINE

## 2020-06-17 RX ORDER — METOPROLOL SUCCINATE 25 MG/1
TABLET, EXTENDED RELEASE ORAL
Qty: 270 TABLET | Refills: 0 | Status: SHIPPED | OUTPATIENT
Start: 2020-06-17 | End: 2020-07-03

## 2020-06-17 NOTE — PROGRESS NOTES
Doximity Video Visit Consent    Marsha Cobb verbally consents to a Doximity Video Visit Check-In service on 6/17/2020. Patient understands that we are using a different platform that may not be as secure as our traditional telehealth platform.  Patient was CORONAVIRUS SARS-COV-2 BY PCR([facility])   Result Value Ref Range    SARS-CoV-2 SOURCE Nares     SARS-CoV-2 by PCR Not Detected             ASSESSMENT AND PLAN:  1. Benign essential HTN  Continue metoprolol succinate at current dose.    Check and record bp at [facility]

## 2020-06-22 ENCOUNTER — PATIENT MESSAGE (OUTPATIENT)
Dept: INTERNAL MEDICINE CLINIC | Facility: CLINIC | Age: 45
End: 2020-06-22

## 2020-06-23 NOTE — TELEPHONE ENCOUNTER
See 5/28/20 TE, disability form already completed, faxed, mailed. However, pt needs clear letter from  that pt can now return to work. Okay to send letter?

## 2020-06-23 NOTE — TELEPHONE ENCOUNTER
From: Manpreet Alves  To:  Braden Nolasco MD  Sent: 6/22/2020 11:44 AM CDT  Subject: Other    Doc good morning, can i request a doctors note that rosa able going back to work because my supervisor she need a doctors note when im going back to work thank you

## 2020-06-23 NOTE — TELEPHONE ENCOUNTER
Okay to give him the general letter with the cdc guidelines for return to work. Okay to add in a statement that says he meets the cdc criteria.

## 2020-06-25 ENCOUNTER — MED REC SCAN ONLY (OUTPATIENT)
Dept: INTERNAL MEDICINE CLINIC | Facility: CLINIC | Age: 45
End: 2020-06-25

## 2020-06-29 ENCOUNTER — APPOINTMENT (OUTPATIENT)
Dept: LAB | Age: 45
End: 2020-06-29
Attending: INTERNAL MEDICINE
Payer: COMMERCIAL

## 2020-06-29 PROCEDURE — 80061 LIPID PANEL: CPT | Performed by: INTERNAL MEDICINE

## 2020-06-29 PROCEDURE — 80053 COMPREHEN METABOLIC PANEL: CPT | Performed by: INTERNAL MEDICINE

## 2020-06-29 PROCEDURE — 36415 COLL VENOUS BLD VENIPUNCTURE: CPT | Performed by: INTERNAL MEDICINE

## 2020-07-03 ENCOUNTER — TELEMEDICINE (OUTPATIENT)
Dept: INTERNAL MEDICINE CLINIC | Facility: CLINIC | Age: 45
End: 2020-07-03

## 2020-07-03 DIAGNOSIS — I10 BENIGN ESSENTIAL HTN: ICD-10-CM

## 2020-07-03 DIAGNOSIS — E78.5 HYPERLIPIDEMIA, UNSPECIFIED HYPERLIPIDEMIA TYPE: Primary | ICD-10-CM

## 2020-07-03 DIAGNOSIS — D64.9 ANEMIA, UNSPECIFIED TYPE: ICD-10-CM

## 2020-07-03 PROBLEM — J12.82 PNEUMONIA DUE TO 2019 NOVEL CORONAVIRUS: Chronic | Status: ACTIVE | Noted: 2020-04-02

## 2020-07-03 PROBLEM — U07.1 PNEUMONIA DUE TO 2019 NOVEL CORONAVIRUS: Chronic | Status: ACTIVE | Noted: 2020-04-02

## 2020-07-03 PROCEDURE — 99214 OFFICE O/P EST MOD 30 MIN: CPT | Performed by: INTERNAL MEDICINE

## 2020-07-03 RX ORDER — METOPROLOL SUCCINATE 50 MG/1
50 TABLET, EXTENDED RELEASE ORAL 2 TIMES DAILY
Qty: 180 TABLET | Refills: 0 | Status: SHIPPED | OUTPATIENT
Start: 2020-07-03 | End: 2020-07-03

## 2020-07-03 RX ORDER — METOPROLOL SUCCINATE 50 MG/1
50 TABLET, EXTENDED RELEASE ORAL 2 TIMES DAILY
Qty: 180 TABLET | Refills: 0 | Status: SHIPPED | OUTPATIENT
Start: 2020-07-03 | End: 2020-09-24

## 2020-07-03 RX ORDER — ATORVASTATIN CALCIUM 10 MG/1
10 TABLET, FILM COATED ORAL NIGHTLY
Qty: 30 TABLET | Refills: 1 | Status: SHIPPED | OUTPATIENT
Start: 2020-07-03 | End: 2020-08-25

## 2020-07-03 NOTE — PROGRESS NOTES
Doximity Video Visit Consent    Donovan Lamine verbally consents to a Doximity Video Visit Check-In service on 7/3/2020. Patient understands that we are using a different platform that may not be as secure as our traditional telehealth platform.  Patient was cristela Constitutional: Patient appears stated age, nourished, and pleasant. Head: Normocephalic and atraumatic. Eyes: EOM are normal.  No scleral icterus. Mouth: moist mucous membranes  Neck: Normal range of motion. No thyromegaly present.  No cervical lymph - Metoprolol Succinate ER 50 MG Oral Tablet 24 Hr; Take 1 tablet (50 mg total) by mouth 2 (two) times a day. Dispense: 180 tablet; Refill: 0    2. Hyperlipidemia, unspecified hyperlipidemia type  Start atorvastatin 10mg daily. Discontinue fenofibrate.

## 2020-07-09 ENCOUNTER — TELEPHONE (OUTPATIENT)
Dept: CARDIOLOGY UNIT | Facility: HOSPITAL | Age: 45
End: 2020-07-09

## 2020-07-09 NOTE — PROGRESS NOTES
Per Dr. Shayy Anne pt needs follow up echo and follow up appointment with Dr. Shayy Anne.   Order for echo placed, called pt to arrange, he states he no longer has insurance coverage as he was dropped from his employer, he has not yet returned to work as he has s

## 2020-08-25 DIAGNOSIS — E78.5 HYPERLIPIDEMIA, UNSPECIFIED HYPERLIPIDEMIA TYPE: ICD-10-CM

## 2020-08-25 RX ORDER — ATORVASTATIN CALCIUM 10 MG/1
TABLET, FILM COATED ORAL
Qty: 30 TABLET | Refills: 0 | Status: SHIPPED | OUTPATIENT
Start: 2020-08-25 | End: 2020-09-24

## 2020-09-18 NOTE — PROGRESS NOTES
Pt received vented and sedated this morning. Pt on V-V Ecmo via R IJ. Flows at 1.5. Plan to wean ECMO today at bedside. Pt opens eyes to speech. Around [address], and [place] along with perfusionist,  RRT & RN - Ecmo was weaned off and de-ca Yes

## 2020-09-24 DIAGNOSIS — I10 BENIGN ESSENTIAL HTN: ICD-10-CM

## 2020-09-24 DIAGNOSIS — E78.5 HYPERLIPIDEMIA, UNSPECIFIED HYPERLIPIDEMIA TYPE: ICD-10-CM

## 2020-09-24 RX ORDER — METOPROLOL SUCCINATE 50 MG/1
TABLET, EXTENDED RELEASE ORAL
Qty: 180 TABLET | Refills: 0 | Status: SHIPPED | OUTPATIENT
Start: 2020-09-24 | End: 2020-12-11

## 2020-09-24 RX ORDER — ATORVASTATIN CALCIUM 10 MG/1
TABLET, FILM COATED ORAL
Qty: 30 TABLET | Refills: 0 | Status: SHIPPED | OUTPATIENT
Start: 2020-09-24 | End: 2020-10-20

## 2020-10-09 ENCOUNTER — OFFICE VISIT (OUTPATIENT)
Dept: INTERNAL MEDICINE CLINIC | Facility: CLINIC | Age: 45
End: 2020-10-09
Payer: COMMERCIAL

## 2020-10-09 VITALS
HEART RATE: 72 BPM | OXYGEN SATURATION: 99 % | DIASTOLIC BLOOD PRESSURE: 80 MMHG | HEIGHT: 62.21 IN | BODY MASS INDEX: 28.37 KG/M2 | WEIGHT: 156.13 LBS | RESPIRATION RATE: 14 BRPM | SYSTOLIC BLOOD PRESSURE: 122 MMHG | TEMPERATURE: 98 F

## 2020-10-09 DIAGNOSIS — Z23 NEED FOR VACCINATION: ICD-10-CM

## 2020-10-09 DIAGNOSIS — Z00.00 PHYSICAL EXAM, ANNUAL: Primary | ICD-10-CM

## 2020-10-09 PROCEDURE — 3079F DIAST BP 80-89 MM HG: CPT | Performed by: INTERNAL MEDICINE

## 2020-10-09 PROCEDURE — 3074F SYST BP LT 130 MM HG: CPT | Performed by: INTERNAL MEDICINE

## 2020-10-09 PROCEDURE — 90686 IIV4 VACC NO PRSV 0.5 ML IM: CPT | Performed by: INTERNAL MEDICINE

## 2020-10-09 PROCEDURE — 99396 PREV VISIT EST AGE 40-64: CPT | Performed by: INTERNAL MEDICINE

## 2020-10-09 PROCEDURE — 90471 IMMUNIZATION ADMIN: CPT | Performed by: INTERNAL MEDICINE

## 2020-10-09 PROCEDURE — 90715 TDAP VACCINE 7 YRS/> IM: CPT | Performed by: INTERNAL MEDICINE

## 2020-10-09 PROCEDURE — 3008F BODY MASS INDEX DOCD: CPT | Performed by: INTERNAL MEDICINE

## 2020-10-09 PROCEDURE — 90472 IMMUNIZATION ADMIN EACH ADD: CPT | Performed by: INTERNAL MEDICINE

## 2020-10-09 NOTE — PROGRESS NOTES
[facility]    CHIEF COMPLAINT: Patient presents with:  Physical: PSA: N/A, Colon: N/A - Age  Immunization/Injection: Flu shot         HPI:   Ole Alegre is a 41year old male who presents for a complete physical exam.   Did not do labs after last Never Used      Tobacco comment: quit in 2013    Alcohol use: Yes      Comment: approx 3 drinks per week     Drug use: No    : single.     Exercise: minimal.  Diet: watches minimally     REVIEW OF SYSTEMS:   GENERAL: feels well otherwise  SKIN: jamie 07:21 AM    CREATSERUM 1.12 06/29/2020 07:21 AM    CA 9.1 06/29/2020 07:21 AM    ALB 4.5 06/29/2020 07:21 AM    TP 8.3 (H) 06/29/2020 07:21 AM    ALKPHO 42 (L) 06/29/2020 07:21 AM    AST 16 06/29/2020 07:21 AM    ALT 22 06/29/2020 07:21 AM    BILT 1.1 06/2

## 2020-10-14 ENCOUNTER — APPOINTMENT (OUTPATIENT)
Dept: LAB | Age: 45
End: 2020-10-14
Attending: INTERNAL MEDICINE
Payer: COMMERCIAL

## 2020-10-14 PROCEDURE — 82248 BILIRUBIN DIRECT: CPT | Performed by: INTERNAL MEDICINE

## 2020-10-14 PROCEDURE — 36415 COLL VENOUS BLD VENIPUNCTURE: CPT | Performed by: INTERNAL MEDICINE

## 2020-10-14 PROCEDURE — 83036 HEMOGLOBIN GLYCOSYLATED A1C: CPT | Performed by: INTERNAL MEDICINE

## 2020-10-14 PROCEDURE — 80050 GENERAL HEALTH PANEL: CPT | Performed by: INTERNAL MEDICINE

## 2020-10-14 PROCEDURE — 80061 LIPID PANEL: CPT | Performed by: INTERNAL MEDICINE

## 2020-10-19 DIAGNOSIS — E78.5 HYPERLIPIDEMIA, UNSPECIFIED HYPERLIPIDEMIA TYPE: ICD-10-CM

## 2020-10-20 RX ORDER — ATORVASTATIN CALCIUM 10 MG/1
TABLET, FILM COATED ORAL
Qty: 90 TABLET | Refills: 1 | Status: SHIPPED | OUTPATIENT
Start: 2020-10-20 | End: 2021-04-20

## 2020-11-24 ENCOUNTER — LAB ENCOUNTER (OUTPATIENT)
Dept: LAB | Age: 45
End: 2020-11-24
Attending: INTERNAL MEDICINE
Payer: COMMERCIAL

## 2020-11-24 DIAGNOSIS — R77.8 ELEVATED TOTAL PROTEIN: ICD-10-CM

## 2020-11-24 PROCEDURE — 84155 ASSAY OF PROTEIN SERUM: CPT

## 2020-11-24 PROCEDURE — 36415 COLL VENOUS BLD VENIPUNCTURE: CPT

## 2020-11-25 DIAGNOSIS — R77.9 ELEVATED BLOOD PROTEIN: Primary | ICD-10-CM

## 2020-12-07 ENCOUNTER — LAB ENCOUNTER (OUTPATIENT)
Dept: LAB | Age: 45
End: 2020-12-07
Attending: INTERNAL MEDICINE
Payer: COMMERCIAL

## 2020-12-07 DIAGNOSIS — R77.9 ELEVATED BLOOD PROTEIN: ICD-10-CM

## 2020-12-07 PROCEDURE — 36415 COLL VENOUS BLD VENIPUNCTURE: CPT

## 2020-12-07 PROCEDURE — 86334 IMMUNOFIX E-PHORESIS SERUM: CPT

## 2020-12-07 PROCEDURE — 83883 ASSAY NEPHELOMETRY NOT SPEC: CPT

## 2020-12-07 PROCEDURE — 84165 PROTEIN E-PHORESIS SERUM: CPT

## 2020-12-11 DIAGNOSIS — I10 BENIGN ESSENTIAL HTN: ICD-10-CM

## 2020-12-11 RX ORDER — METOPROLOL SUCCINATE 50 MG/1
TABLET, EXTENDED RELEASE ORAL
Qty: 180 TABLET | Refills: 1 | Status: SHIPPED | OUTPATIENT
Start: 2020-12-11 | End: 2021-05-13

## 2021-01-05 NOTE — PROGRESS NOTES
Reviewed in detail with Dr. Gilberto Knight, Dr. Ankur William and nursing staff.     Overnight issues with QTc prolongation, NSVT and bleeding from access site - also experience an apparent transfusion reaction with profound hypotension after initiating transfusion of PRBC' Complex Repair And Dorsal Nasal Flap Text: The defect edges were debeveled with a #15 scalpel blade.  The primary defect was closed partially with a complex linear closure.  Given the location of the remaining defect, shape of the defect and the proximity to free margins a dorsal nasal flap was deemed most appropriate for complete closure of the defect.  Using a sterile surgical marker, an appropriate flap was drawn incorporating the defect and placing the expected incisions within the relaxed skin tension lines where possible.    The area thus outlined was incised deep to adipose tissue with a #15 scalpel blade.  The skin margins were undermined to an appropriate distance in all directions utilizing iris scissors.

## 2021-03-23 DIAGNOSIS — E78.5 HYPERLIPIDEMIA, UNSPECIFIED HYPERLIPIDEMIA TYPE: ICD-10-CM

## 2021-03-23 DIAGNOSIS — I10 BENIGN ESSENTIAL HTN: ICD-10-CM

## 2021-03-23 RX ORDER — ATORVASTATIN CALCIUM 10 MG/1
10 TABLET, FILM COATED ORAL NIGHTLY
Qty: 90 TABLET | Refills: 1 | OUTPATIENT
Start: 2021-03-23

## 2021-03-23 RX ORDER — METOPROLOL SUCCINATE 50 MG/1
50 TABLET, EXTENDED RELEASE ORAL 2 TIMES DAILY
Qty: 180 TABLET | Refills: 1 | OUTPATIENT
Start: 2021-03-23

## 2021-03-23 RX ORDER — ATORVASTATIN CALCIUM 10 MG/1
TABLET, FILM COATED ORAL
Qty: 90 TABLET | Refills: 0 | OUTPATIENT
Start: 2021-03-23

## 2021-03-26 ENCOUNTER — TELEPHONE (OUTPATIENT)
Dept: INTERNAL MEDICINE CLINIC | Facility: CLINIC | Age: 46
End: 2021-03-26

## 2021-03-26 NOTE — TELEPHONE ENCOUNTER
Pre-Op Clearance Request form faxed to Dr. Garcia Fuelling office with note URGENT. Confirmed fax sent    Forwarded to [address] [place] [address] to follow-up on completed form.

## 2021-03-26 NOTE — TELEPHONE ENCOUNTER
Date of pre-op appt:  3/30/21  Provider pre-op scheduled with: Juan J Alfaro  Surgeon's name:  Dr Estefany Mccracken   Phone #:  [phone]   Fax #:  [phone]  Surgery date:  4/1/21  Procedure/diagnosis:  Ada Plenty stone     Sending high priority due to time

## 2021-04-13 ENCOUNTER — TELEPHONE (OUTPATIENT)
Dept: INTERNAL MEDICINE CLINIC | Facility: CLINIC | Age: 46
End: 2021-04-13

## 2021-04-19 ENCOUNTER — LAB ENCOUNTER (OUTPATIENT)
Dept: LAB | Age: 46
End: 2021-04-19
Attending: NURSE PRACTITIONER
Payer: COMMERCIAL

## 2021-04-19 ENCOUNTER — OFFICE VISIT (OUTPATIENT)
Dept: INTERNAL MEDICINE CLINIC | Facility: CLINIC | Age: 46
End: 2021-04-19
Payer: COMMERCIAL

## 2021-04-19 VITALS
HEIGHT: 62.21 IN | OXYGEN SATURATION: 99 % | DIASTOLIC BLOOD PRESSURE: 86 MMHG | TEMPERATURE: 98 F | BODY MASS INDEX: 27.8 KG/M2 | WEIGHT: 153 LBS | SYSTOLIC BLOOD PRESSURE: 120 MMHG | HEART RATE: 76 BPM | RESPIRATION RATE: 14 BRPM

## 2021-04-19 DIAGNOSIS — K61.1 PERIRECTAL ABSCESS: ICD-10-CM

## 2021-04-19 DIAGNOSIS — I10 BENIGN ESSENTIAL HTN: ICD-10-CM

## 2021-04-19 DIAGNOSIS — Z13.1 SCREENING FOR DIABETES MELLITUS: ICD-10-CM

## 2021-04-19 DIAGNOSIS — Z90.49 S/P CHOLECYSTECTOMY: ICD-10-CM

## 2021-04-19 DIAGNOSIS — Z09 HOSPITAL DISCHARGE FOLLOW-UP: Primary | ICD-10-CM

## 2021-04-19 DIAGNOSIS — D64.9 ANEMIA, UNSPECIFIED TYPE: ICD-10-CM

## 2021-04-19 DIAGNOSIS — S36.81XA PERITONEAL HEMATOMA: ICD-10-CM

## 2021-04-19 DIAGNOSIS — E78.5 HYPERLIPIDEMIA, UNSPECIFIED HYPERLIPIDEMIA TYPE: ICD-10-CM

## 2021-04-19 DIAGNOSIS — K83.8 DILATED BILE DUCT: ICD-10-CM

## 2021-04-19 DIAGNOSIS — Z13.29 SCREENING FOR THYROID DISORDER: ICD-10-CM

## 2021-04-19 DIAGNOSIS — K80.20 SYMPTOMATIC CHOLELITHIASIS: ICD-10-CM

## 2021-04-19 PROBLEM — Z86.16 HISTORY OF 2019 NOVEL CORONAVIRUS DISEASE (COVID-19): Status: ACTIVE | Noted: 2020-03-01

## 2021-04-19 PROBLEM — U07.1 DISEASE DUE TO SEVERE ACUTE RESPIRATORY SYNDROME CORONAVIRUS 2 (SARS-COV-2): Status: ACTIVE | Noted: 2020-04-02

## 2021-04-19 PROBLEM — F43.23 ADJUSTMENT DISORDER WITH MIXED ANXIETY AND DEPRESSED MOOD: Status: ACTIVE | Noted: 2020-05-07

## 2021-04-19 PROCEDURE — 99495 TRANSJ CARE MGMT MOD F2F 14D: CPT | Performed by: NURSE PRACTITIONER

## 2021-04-19 PROCEDURE — 3074F SYST BP LT 130 MM HG: CPT | Performed by: NURSE PRACTITIONER

## 2021-04-19 PROCEDURE — 85025 COMPLETE CBC W/AUTO DIFF WBC: CPT | Performed by: NURSE PRACTITIONER

## 2021-04-19 PROCEDURE — 3079F DIAST BP 80-89 MM HG: CPT | Performed by: NURSE PRACTITIONER

## 2021-04-19 PROCEDURE — 36415 COLL VENOUS BLD VENIPUNCTURE: CPT | Performed by: NURSE PRACTITIONER

## 2021-04-19 PROCEDURE — 3008F BODY MASS INDEX DOCD: CPT | Performed by: NURSE PRACTITIONER

## 2021-04-19 RX ORDER — PSEUDOEPHEDRINE HCL 30 MG
100 TABLET ORAL DAILY
COMMUNITY
Start: 2021-03-30

## 2021-04-19 RX ORDER — ACETAMINOPHEN 325 MG/1
650 TABLET ORAL AS NEEDED
COMMUNITY

## 2021-04-19 RX ORDER — AMOXICILLIN AND CLAVULANATE POTASSIUM 875; 125 MG/1; MG/1
875 TABLET, FILM COATED ORAL 2 TIMES DAILY
COMMUNITY
Start: 2021-04-11 | End: 2021-10-12 | Stop reason: ALTCHOICE

## 2021-04-19 RX ORDER — LACTOBACILLUS ACIDOPH-L.BULGARICUS 1 MILLION CELL CHEWABLE TABLET 1MM CELL
1 TABLET,CHEWABLE ORAL 2 TIMES DAILY
COMMUNITY
Start: 2021-04-11 | End: 2021-10-12

## 2021-04-19 NOTE — PATIENT INSTRUCTIONS
See surgery as planned    Continue medications per surgery    Continue your blood pressure medications    Get your CBC blood work completed now. Get your labs done in October before your annual visit. You should be fasting for at least 10 hours.  If you

## 2021-04-19 NOTE — PROGRESS NOTES
HPI:    Damián Ambrosio is a 40year old male here today for hospital follow up.    He was discharged from Inpatient hospital[facility] to [address] [place] [address] Date: 3/28  Then readmitted on 4/8/21  Discharge Date: 3/31 - second discharge 4/11/21  Ho PCP and surgery as outpt. He reports he is doing well now. He has no pain. He has regular bowel movements with no rectal pain. He is taking his medications as directed. He has a stent removal planned for next week.  He is to see surgery in a couple week smokeless tobacco. He reports current alcohol use. He reports that he does not use drugs. ROS:   GENERAL: weight stable, energy stable, no sweating  SKIN: denies any unusual skin lesions, has surgical incisions.    EYES: denies blurred vision or double hematoma  Perirectal abscess  -Continue Augmentin as ordered by surgery. Advised to take it with yogurt or take a probiotic and monitor for diarrhea.  -Continue to follow surgery as an outpatient. Is also scheduled for stent removal next week.   -CBC orde 4/19/2021

## 2021-04-20 PROBLEM — U07.1 PNEUMONIA DUE TO 2019 NOVEL CORONAVIRUS: Chronic | Status: RESOLVED | Noted: 2020-04-02 | Resolved: 2021-04-20

## 2021-04-20 PROBLEM — J12.82 PNEUMONIA DUE TO 2019 NOVEL CORONAVIRUS: Chronic | Status: RESOLVED | Noted: 2020-04-02 | Resolved: 2021-04-20

## 2021-04-20 PROBLEM — U07.1 DISEASE DUE TO SEVERE ACUTE RESPIRATORY SYNDROME CORONAVIRUS 2 (SARS-COV-2): Status: RESOLVED | Noted: 2020-04-02 | Resolved: 2021-04-20

## 2021-04-20 RX ORDER — ATORVASTATIN CALCIUM 10 MG/1
10 TABLET, FILM COATED ORAL NIGHTLY
Qty: 90 TABLET | Refills: 1 | Status: SHIPPED | OUTPATIENT
Start: 2021-04-20 | End: 2021-10-05

## 2021-04-20 NOTE — TELEPHONE ENCOUNTER
Patient called to schedule a HFU, patient was discharged from [facility] on 4/11/21. Patient scheduled for 4/19/21 with Tammy. TCM order not placed, emailing supervisor.

## 2021-05-13 ENCOUNTER — OFFICE VISIT (OUTPATIENT)
Dept: OPHTHALMOLOGY | Age: 46
End: 2021-05-13

## 2021-05-13 DIAGNOSIS — I10 BENIGN ESSENTIAL HTN: ICD-10-CM

## 2021-05-13 DIAGNOSIS — H43.813 VITREOUS DEGENERATION OF BOTH EYES: Primary | ICD-10-CM

## 2021-05-13 DIAGNOSIS — H04.123 DRY EYE SYNDROME, BILATERAL: ICD-10-CM

## 2021-05-13 PROCEDURE — 92004 COMPRE OPH EXAM NEW PT 1/>: CPT | Performed by: OPHTHALMOLOGY

## 2021-05-13 RX ORDER — METOPROLOL SUCCINATE 50 MG/1
TABLET, EXTENDED RELEASE ORAL
Qty: 180 TABLET | Refills: 1 | Status: SHIPPED | OUTPATIENT
Start: 2021-05-13 | End: 2021-10-19

## 2021-05-13 ASSESSMENT — VISUAL ACUITY
OS_SC: 20/20
METHOD: SNELLEN - LINEAR
OD_SC: 20/20

## 2021-05-13 ASSESSMENT — CONF VISUAL FIELD
OS_NORMAL: 1
OD_NORMAL: 1

## 2021-05-13 ASSESSMENT — REFRACTION
OS_CYLINDER: +0.75
OS_AXIS: 012
OD_AXIS: 156
OD_CYLINDER: +0.50
OD_SPHERE: -0.25
OS_SPHERE: -0.25

## 2021-05-13 ASSESSMENT — SLIT LAMP EXAM - LIDS
COMMENTS: NORMAL
COMMENTS: NORMAL

## 2021-05-13 ASSESSMENT — EXTERNAL EXAM - LEFT EYE: OS_EXAM: NORMAL

## 2021-05-13 ASSESSMENT — CUP TO DISC RATIO
OD_RATIO: 0.5
OS_RATIO: 0.5

## 2021-05-13 ASSESSMENT — TONOMETRY
OS_IOP_MMHG: 15
IOP_METHOD: APPLANATION
OD_IOP_MMHG: 15

## 2021-05-13 ASSESSMENT — EXTERNAL EXAM - RIGHT EYE: OD_EXAM: NORMAL

## 2021-05-13 NOTE — TELEPHONE ENCOUNTER
Refilled per protocol. Labs due oct prior to visit. Return in 6 months (on 10/19/2021).   See surgery as planned     Continue medications per surgery     Continue your blood pressure medications     Get your CBC blood work completed now.     Get your lab

## 2021-10-05 ENCOUNTER — TELEPHONE (OUTPATIENT)
Dept: INTERNAL MEDICINE CLINIC | Facility: CLINIC | Age: 46
End: 2021-10-05

## 2021-10-05 DIAGNOSIS — E78.5 HYPERLIPIDEMIA, UNSPECIFIED HYPERLIPIDEMIA TYPE: ICD-10-CM

## 2021-10-05 RX ORDER — ATORVASTATIN CALCIUM 10 MG/1
TABLET, FILM COATED ORAL
Qty: 90 TABLET | Refills: 0 | Status: SHIPPED | OUTPATIENT
Start: 2021-10-05 | End: 2021-12-22

## 2021-10-05 NOTE — TELEPHONE ENCOUNTER
Rx approved. PSR - Please schedule PE & remind to do fasting labs. Thanks! If pt cannot get labs done by 10/19/21, please send back so we can renew lab orders since [facility]'s labs  after 6 months.

## 2021-10-12 ENCOUNTER — OFFICE VISIT (OUTPATIENT)
Dept: INTERNAL MEDICINE CLINIC | Facility: CLINIC | Age: 46
End: 2021-10-12
Payer: COMMERCIAL

## 2021-10-12 ENCOUNTER — LAB ENCOUNTER (OUTPATIENT)
Dept: LAB | Age: 46
End: 2021-10-12
Attending: INTERNAL MEDICINE
Payer: COMMERCIAL

## 2021-10-12 VITALS
RESPIRATION RATE: 12 BRPM | BODY MASS INDEX: 28.83 KG/M2 | DIASTOLIC BLOOD PRESSURE: 92 MMHG | HEART RATE: 64 BPM | TEMPERATURE: 98 F | HEIGHT: 62.5 IN | SYSTOLIC BLOOD PRESSURE: 150 MMHG | WEIGHT: 160.69 LBS

## 2021-10-12 DIAGNOSIS — I10 BENIGN ESSENTIAL HTN: ICD-10-CM

## 2021-10-12 DIAGNOSIS — Z23 NEED FOR VACCINATION: ICD-10-CM

## 2021-10-12 DIAGNOSIS — Z00.00 PHYSICAL EXAM, ANNUAL: Primary | ICD-10-CM

## 2021-10-12 PROCEDURE — 80061 LIPID PANEL: CPT | Performed by: NURSE PRACTITIONER

## 2021-10-12 PROCEDURE — 3008F BODY MASS INDEX DOCD: CPT | Performed by: INTERNAL MEDICINE

## 2021-10-12 PROCEDURE — 99396 PREV VISIT EST AGE 40-64: CPT | Performed by: INTERNAL MEDICINE

## 2021-10-12 PROCEDURE — 90471 IMMUNIZATION ADMIN: CPT | Performed by: INTERNAL MEDICINE

## 2021-10-12 PROCEDURE — 83036 HEMOGLOBIN GLYCOSYLATED A1C: CPT | Performed by: NURSE PRACTITIONER

## 2021-10-12 PROCEDURE — 3080F DIAST BP >= 90 MM HG: CPT | Performed by: INTERNAL MEDICINE

## 2021-10-12 PROCEDURE — 90686 IIV4 VACC NO PRSV 0.5 ML IM: CPT | Performed by: INTERNAL MEDICINE

## 2021-10-12 PROCEDURE — 80050 GENERAL HEALTH PANEL: CPT | Performed by: NURSE PRACTITIONER

## 2021-10-12 PROCEDURE — 99214 OFFICE O/P EST MOD 30 MIN: CPT | Performed by: INTERNAL MEDICINE

## 2021-10-12 PROCEDURE — 3077F SYST BP >= 140 MM HG: CPT | Performed by: INTERNAL MEDICINE

## 2021-10-12 RX ORDER — AMLODIPINE BESYLATE 5 MG/1
5 TABLET ORAL DAILY
Qty: 30 TABLET | Refills: 1 | Status: SHIPPED | OUTPATIENT
Start: 2021-10-12 | End: 2021-11-12

## 2021-10-13 ENCOUNTER — MED REC SCAN ONLY (OUTPATIENT)
Dept: INTERNAL MEDICINE CLINIC | Facility: CLINIC | Age: 46
End: 2021-10-13

## 2021-10-19 DIAGNOSIS — I10 BENIGN ESSENTIAL HTN: ICD-10-CM

## 2021-10-19 RX ORDER — METOPROLOL SUCCINATE 50 MG/1
TABLET, EXTENDED RELEASE ORAL
Qty: 180 TABLET | Refills: 0 | Status: SHIPPED | OUTPATIENT
Start: 2021-10-19

## 2021-11-10 ENCOUNTER — LAB ENCOUNTER (OUTPATIENT)
Dept: LAB | Age: 46
End: 2021-11-10
Attending: INTERNAL MEDICINE
Payer: COMMERCIAL

## 2021-11-10 DIAGNOSIS — I10 ESSENTIAL HYPERTENSION: ICD-10-CM

## 2021-11-10 PROCEDURE — 80053 COMPREHEN METABOLIC PANEL: CPT | Performed by: INTERNAL MEDICINE

## 2021-11-12 ENCOUNTER — OFFICE VISIT (OUTPATIENT)
Dept: INTERNAL MEDICINE CLINIC | Facility: CLINIC | Age: 46
End: 2021-11-12
Payer: COMMERCIAL

## 2021-11-12 VITALS
RESPIRATION RATE: 12 BRPM | DIASTOLIC BLOOD PRESSURE: 82 MMHG | HEART RATE: 80 BPM | TEMPERATURE: 98 F | SYSTOLIC BLOOD PRESSURE: 130 MMHG | WEIGHT: 165.81 LBS | BODY MASS INDEX: 29.75 KG/M2 | HEIGHT: 62.5 IN

## 2021-11-12 DIAGNOSIS — I10 BENIGN ESSENTIAL HTN: ICD-10-CM

## 2021-11-12 DIAGNOSIS — R77.9 ELEVATED SERUM PROTEIN LEVEL: ICD-10-CM

## 2021-11-12 DIAGNOSIS — E78.5 HYPERLIPIDEMIA, UNSPECIFIED HYPERLIPIDEMIA TYPE: ICD-10-CM

## 2021-11-12 PROCEDURE — 3008F BODY MASS INDEX DOCD: CPT | Performed by: INTERNAL MEDICINE

## 2021-11-12 PROCEDURE — 3079F DIAST BP 80-89 MM HG: CPT | Performed by: INTERNAL MEDICINE

## 2021-11-12 PROCEDURE — 3075F SYST BP GE 130 - 139MM HG: CPT | Performed by: INTERNAL MEDICINE

## 2021-11-12 PROCEDURE — 99214 OFFICE O/P EST MOD 30 MIN: CPT | Performed by: INTERNAL MEDICINE

## 2021-11-12 RX ORDER — AMLODIPINE BESYLATE 5 MG/1
5 TABLET ORAL DAILY
Qty: 90 TABLET | Refills: 1 | Status: SHIPPED | OUTPATIENT
Start: 2021-11-12

## 2021-11-12 NOTE — PROGRESS NOTES
[facility]    CHIEF COMPLAINT:  Patient presents with:  Blood Pressure:  last week checked b/p at /89        HISTORY OF PRESENT ILLNESS:  Here for med check. Htn: Taking meds regularly. No chest pain, no shortness of breath.  Tolerating w ALT 51 16 - 61 U/L    Alkaline Phosphatase 67 45 - 117 U/L    Bilirubin, Total 1.5 0.1 - 2.0 mg/dL    Total Protein 7.9 6.4 - 8.2 g/dL    Albumin 4.4 3.4 - 5.0 g/dL    Globulin  3.5 2.8 - 4.4 g/dL    A/G Ratio 1.3 1.0 - 2.0    FASTING Yes             AS

## 2021-12-22 DIAGNOSIS — E78.5 HYPERLIPIDEMIA, UNSPECIFIED HYPERLIPIDEMIA TYPE: ICD-10-CM

## 2021-12-22 RX ORDER — ATORVASTATIN CALCIUM 10 MG/1
TABLET, FILM COATED ORAL
Qty: 90 TABLET | Refills: 0 | Status: SHIPPED | OUTPATIENT
Start: 2021-12-22

## 2022-01-14 NOTE — TELEPHONE ENCOUNTER
Called patient. ID verified with Name and . Spoke with patient in regards to information received. Informed patient that provider recommended Dr. Susanne Enriquez at this time, if patient could not get in sooner with Dr. Asuncion Dorsey. Provided patient with contact information for Dr. Ted Arechiga at this time. Patient states that he understands the information received and has no further questions or concerns at this time. Spoke to pt, he needs a letter stating that he is cleared to go back to work. He states that he needs a date on the letter. Advised pt that it will have the date after his OV, he is fine with that. Letter pending. Pt wants to  letter.   Please ca

## 2022-04-18 RX ORDER — METOPROLOL SUCCINATE 50 MG/1
TABLET, EXTENDED RELEASE ORAL
Qty: 180 TABLET | Refills: 0 | Status: SHIPPED | OUTPATIENT
Start: 2022-04-18

## 2022-04-18 RX ORDER — ATORVASTATIN CALCIUM 10 MG/1
TABLET, FILM COATED ORAL
Qty: 90 TABLET | Refills: 0 | Status: SHIPPED | OUTPATIENT
Start: 2022-04-18

## 2022-05-10 ENCOUNTER — LAB ENCOUNTER (OUTPATIENT)
Dept: LAB | Age: 47
End: 2022-05-10
Attending: INTERNAL MEDICINE
Payer: COMMERCIAL

## 2022-05-10 DIAGNOSIS — E78.5 HYPERLIPEMIA: Primary | ICD-10-CM

## 2022-05-10 LAB
ALBUMIN SERPL-MCNC: 4.2 G/DL (ref 3.4–5)
ALBUMIN/GLOB SERPL: 1.2 {RATIO} (ref 1–2)
ALP LIVER SERPL-CCNC: 58 U/L
ALT SERPL-CCNC: 47 U/L
ANION GAP SERPL CALC-SCNC: 5 MMOL/L (ref 0–18)
AST SERPL-CCNC: 19 U/L (ref 15–37)
BILIRUB SERPL-MCNC: 1 MG/DL (ref 0.1–2)
BUN BLD-MCNC: 20 MG/DL (ref 7–18)
CALCIUM BLD-MCNC: 9.3 MG/DL (ref 8.5–10.1)
CHLORIDE SERPL-SCNC: 111 MMOL/L (ref 98–112)
CHOLEST SERPL-MCNC: 174 MG/DL (ref ?–200)
CO2 SERPL-SCNC: 25 MMOL/L (ref 21–32)
CREAT BLD-MCNC: 1.18 MG/DL
FASTING PATIENT LIPID ANSWER: YES
FASTING STATUS PATIENT QL REPORTED: YES
GLOBULIN PLAS-MCNC: 3.6 G/DL (ref 2.8–4.4)
GLUCOSE BLD-MCNC: 114 MG/DL (ref 70–99)
HDLC SERPL-MCNC: 38 MG/DL (ref 40–59)
LDLC SERPL CALC-MCNC: 106 MG/DL (ref ?–100)
NONHDLC SERPL-MCNC: 136 MG/DL (ref ?–130)
OSMOLALITY SERPL CALC.SUM OF ELEC: 295 MOSM/KG (ref 275–295)
POTASSIUM SERPL-SCNC: 4.2 MMOL/L (ref 3.5–5.1)
PROT SERPL-MCNC: 7.8 G/DL (ref 6.4–8.2)
SODIUM SERPL-SCNC: 141 MMOL/L (ref 136–145)
TRIGL SERPL-MCNC: 170 MG/DL (ref 30–149)
VLDLC SERPL CALC-MCNC: 29 MG/DL (ref 0–30)

## 2022-05-10 PROCEDURE — 80053 COMPREHEN METABOLIC PANEL: CPT | Performed by: INTERNAL MEDICINE

## 2022-05-10 PROCEDURE — 80061 LIPID PANEL: CPT | Performed by: INTERNAL MEDICINE

## 2022-05-12 ENCOUNTER — OFFICE VISIT (OUTPATIENT)
Dept: INTERNAL MEDICINE CLINIC | Facility: CLINIC | Age: 47
End: 2022-05-12
Payer: COMMERCIAL

## 2022-05-12 VITALS
BODY MASS INDEX: 29.79 KG/M2 | RESPIRATION RATE: 16 BRPM | OXYGEN SATURATION: 98 % | DIASTOLIC BLOOD PRESSURE: 82 MMHG | HEIGHT: 62.5 IN | HEART RATE: 78 BPM | SYSTOLIC BLOOD PRESSURE: 120 MMHG | TEMPERATURE: 98 F | WEIGHT: 166 LBS

## 2022-05-12 DIAGNOSIS — Z12.11 SCREENING FOR COLON CANCER: ICD-10-CM

## 2022-05-12 DIAGNOSIS — E78.5 HYPERLIPIDEMIA, UNSPECIFIED HYPERLIPIDEMIA TYPE: ICD-10-CM

## 2022-05-12 DIAGNOSIS — I10 BENIGN ESSENTIAL HTN: ICD-10-CM

## 2022-05-12 DIAGNOSIS — R73.01 IMPAIRED FASTING GLUCOSE: ICD-10-CM

## 2022-05-12 DIAGNOSIS — Z00.00 PHYSICAL EXAM, ANNUAL: ICD-10-CM

## 2022-05-12 PROCEDURE — 99214 OFFICE O/P EST MOD 30 MIN: CPT | Performed by: INTERNAL MEDICINE

## 2022-05-12 PROCEDURE — 3008F BODY MASS INDEX DOCD: CPT | Performed by: INTERNAL MEDICINE

## 2022-05-12 PROCEDURE — 3074F SYST BP LT 130 MM HG: CPT | Performed by: INTERNAL MEDICINE

## 2022-05-12 PROCEDURE — 3079F DIAST BP 80-89 MM HG: CPT | Performed by: INTERNAL MEDICINE

## 2022-05-12 RX ORDER — AMLODIPINE BESYLATE 5 MG/1
5 TABLET ORAL DAILY
Qty: 90 TABLET | Refills: 1 | Status: SHIPPED | OUTPATIENT
Start: 2022-05-12

## 2022-05-12 RX ORDER — ATORVASTATIN CALCIUM 10 MG/1
10 TABLET, FILM COATED ORAL NIGHTLY
Qty: 90 TABLET | Refills: 1 | Status: SHIPPED | OUTPATIENT
Start: 2022-05-12

## 2022-05-12 RX ORDER — METOPROLOL SUCCINATE 50 MG/1
50 TABLET, EXTENDED RELEASE ORAL 2 TIMES DAILY
Qty: 180 TABLET | Refills: 1 | Status: SHIPPED | OUTPATIENT
Start: 2022-05-12

## 2022-07-09 NOTE — ED INITIAL ASSESSMENT (HPI)
Patient with fevers, 101.6 here, diarrhea, cough x 7 days.  Patient lives with his parents who are both hospitalized for COVID at [facility]
No

## 2022-11-02 ENCOUNTER — OFFICE VISIT (OUTPATIENT)
Dept: INTERNAL MEDICINE CLINIC | Facility: CLINIC | Age: 47
End: 2022-11-02
Payer: COMMERCIAL

## 2022-11-02 ENCOUNTER — LAB ENCOUNTER (OUTPATIENT)
Dept: LAB | Age: 47
End: 2022-11-02
Attending: INTERNAL MEDICINE
Payer: COMMERCIAL

## 2022-11-02 VITALS
RESPIRATION RATE: 16 BRPM | DIASTOLIC BLOOD PRESSURE: 76 MMHG | SYSTOLIC BLOOD PRESSURE: 142 MMHG | TEMPERATURE: 99 F | HEIGHT: 62.5 IN | BODY MASS INDEX: 31.25 KG/M2 | HEART RATE: 86 BPM | OXYGEN SATURATION: 96 % | WEIGHT: 174.19 LBS

## 2022-11-02 DIAGNOSIS — H04.123 DRY EYES: ICD-10-CM

## 2022-11-02 DIAGNOSIS — H11.31 SUBCONJUNCTIVAL HEMORRHAGE OF RIGHT EYE: Primary | ICD-10-CM

## 2022-11-02 LAB
ALBUMIN SERPL-MCNC: 4.4 G/DL (ref 3.4–5)
ALBUMIN/GLOB SERPL: 1.3 {RATIO} (ref 1–2)
ALP LIVER SERPL-CCNC: 60 U/L
ALT SERPL-CCNC: 57 U/L
ANION GAP SERPL CALC-SCNC: 3 MMOL/L (ref 0–18)
AST SERPL-CCNC: 25 U/L (ref 15–37)
BASOPHILS # BLD AUTO: 0.07 X10(3) UL (ref 0–0.2)
BASOPHILS NFR BLD AUTO: 1.1 %
BILIRUB SERPL-MCNC: 1.4 MG/DL (ref 0.1–2)
BUN BLD-MCNC: 16 MG/DL (ref 7–18)
CALCIUM BLD-MCNC: 9.2 MG/DL (ref 8.5–10.1)
CHLORIDE SERPL-SCNC: 108 MMOL/L (ref 98–112)
CHOLEST SERPL-MCNC: 214 MG/DL (ref ?–200)
CO2 SERPL-SCNC: 27 MMOL/L (ref 21–32)
CREAT BLD-MCNC: 1.17 MG/DL
EOSINOPHIL # BLD AUTO: 0.15 X10(3) UL (ref 0–0.7)
EOSINOPHIL NFR BLD AUTO: 2.4 %
ERYTHROCYTE [DISTWIDTH] IN BLOOD BY AUTOMATED COUNT: 14.8 %
EST. AVERAGE GLUCOSE BLD GHB EST-MCNC: 88 MG/DL (ref 68–126)
FASTING PATIENT LIPID ANSWER: YES
FASTING STATUS PATIENT QL REPORTED: YES
GFR SERPLBLD BASED ON 1.73 SQ M-ARVRAT: 78 ML/MIN/1.73M2 (ref 60–?)
GLOBULIN PLAS-MCNC: 3.3 G/DL (ref 2.8–4.4)
GLUCOSE BLD-MCNC: 111 MG/DL (ref 70–99)
HBA1C MFR BLD: 4.7 % (ref ?–5.7)
HCT VFR BLD AUTO: 39.2 %
HDLC SERPL-MCNC: 45 MG/DL (ref 40–59)
HGB BLD-MCNC: 13.4 G/DL
IMM GRANULOCYTES # BLD AUTO: 0.03 X10(3) UL (ref 0–1)
IMM GRANULOCYTES NFR BLD: 0.5 %
LDLC SERPL CALC-MCNC: 124 MG/DL (ref ?–100)
LYMPHOCYTES # BLD AUTO: 1.56 X10(3) UL (ref 1–4)
LYMPHOCYTES NFR BLD AUTO: 25.4 %
MCH RBC QN AUTO: 34.7 PG (ref 26–34)
MCHC RBC AUTO-ENTMCNC: 34.2 G/DL (ref 31–37)
MCV RBC AUTO: 101.6 FL
MONOCYTES # BLD AUTO: 0.53 X10(3) UL (ref 0.1–1)
MONOCYTES NFR BLD AUTO: 8.6 %
NEUTROPHILS # BLD AUTO: 3.81 X10 (3) UL (ref 1.5–7.7)
NEUTROPHILS # BLD AUTO: 3.81 X10(3) UL (ref 1.5–7.7)
NEUTROPHILS NFR BLD AUTO: 62 %
NONHDLC SERPL-MCNC: 169 MG/DL (ref ?–130)
OSMOLALITY SERPL CALC.SUM OF ELEC: 288 MOSM/KG (ref 275–295)
PLATELET # BLD AUTO: 274 10(3)UL (ref 150–450)
POTASSIUM SERPL-SCNC: 4.6 MMOL/L (ref 3.5–5.1)
PROT SERPL-MCNC: 7.7 G/DL (ref 6.4–8.2)
RBC # BLD AUTO: 3.86 X10(6)UL
SODIUM SERPL-SCNC: 138 MMOL/L (ref 136–145)
TRIGL SERPL-MCNC: 255 MG/DL (ref 30–149)
TSI SER-ACNC: 1.11 MIU/ML (ref 0.36–3.74)
VLDLC SERPL CALC-MCNC: 46 MG/DL (ref 0–30)
WBC # BLD AUTO: 6.2 X10(3) UL (ref 4–11)

## 2022-11-02 PROCEDURE — 80061 LIPID PANEL: CPT | Performed by: INTERNAL MEDICINE

## 2022-11-02 PROCEDURE — 99213 OFFICE O/P EST LOW 20 MIN: CPT | Performed by: NURSE PRACTITIONER

## 2022-11-02 PROCEDURE — 3008F BODY MASS INDEX DOCD: CPT | Performed by: NURSE PRACTITIONER

## 2022-11-02 PROCEDURE — 3077F SYST BP >= 140 MM HG: CPT | Performed by: NURSE PRACTITIONER

## 2022-11-02 PROCEDURE — 83036 HEMOGLOBIN GLYCOSYLATED A1C: CPT | Performed by: INTERNAL MEDICINE

## 2022-11-02 PROCEDURE — 80050 GENERAL HEALTH PANEL: CPT | Performed by: INTERNAL MEDICINE

## 2022-11-02 PROCEDURE — 3078F DIAST BP <80 MM HG: CPT | Performed by: NURSE PRACTITIONER

## 2022-11-02 NOTE — PATIENT INSTRUCTIONS
See ophthalmology    Make an appointment with Dr. Chanel Jackson for your annual physical within a month or sooner as needed

## 2022-11-12 DIAGNOSIS — I10 BENIGN ESSENTIAL HTN: ICD-10-CM

## 2022-11-14 RX ORDER — AMLODIPINE BESYLATE 5 MG/1
TABLET ORAL
Qty: 90 TABLET | Refills: 0 | Status: SHIPPED | OUTPATIENT
Start: 2022-11-14

## 2022-11-29 ENCOUNTER — OFFICE VISIT (OUTPATIENT)
Dept: INTERNAL MEDICINE CLINIC | Facility: CLINIC | Age: 47
End: 2022-11-29
Payer: COMMERCIAL

## 2022-11-29 VITALS
RESPIRATION RATE: 16 BRPM | SYSTOLIC BLOOD PRESSURE: 132 MMHG | DIASTOLIC BLOOD PRESSURE: 88 MMHG | WEIGHT: 175.13 LBS | TEMPERATURE: 98 F | BODY MASS INDEX: 31.42 KG/M2 | HEART RATE: 80 BPM | HEIGHT: 62.5 IN

## 2022-11-29 DIAGNOSIS — I10 BENIGN ESSENTIAL HTN: ICD-10-CM

## 2022-11-29 DIAGNOSIS — Z00.00 PHYSICAL EXAM, ANNUAL: Primary | ICD-10-CM

## 2022-11-29 DIAGNOSIS — R73.01 IMPAIRED FASTING GLUCOSE: ICD-10-CM

## 2022-11-29 DIAGNOSIS — R71.8 ELEVATED MCV: ICD-10-CM

## 2022-11-29 DIAGNOSIS — E78.5 HYPERLIPIDEMIA, UNSPECIFIED HYPERLIPIDEMIA TYPE: ICD-10-CM

## 2022-11-29 PROCEDURE — 3008F BODY MASS INDEX DOCD: CPT | Performed by: INTERNAL MEDICINE

## 2022-11-29 PROCEDURE — 3075F SYST BP GE 130 - 139MM HG: CPT | Performed by: INTERNAL MEDICINE

## 2022-11-29 PROCEDURE — 3079F DIAST BP 80-89 MM HG: CPT | Performed by: INTERNAL MEDICINE

## 2022-11-29 PROCEDURE — 99214 OFFICE O/P EST MOD 30 MIN: CPT | Performed by: INTERNAL MEDICINE

## 2022-11-29 PROCEDURE — 99396 PREV VISIT EST AGE 40-64: CPT | Performed by: INTERNAL MEDICINE

## 2022-11-29 RX ORDER — AMLODIPINE BESYLATE 10 MG/1
10 TABLET ORAL DAILY
Qty: 90 TABLET | Refills: 1 | Status: SHIPPED | OUTPATIENT
Start: 2022-11-29

## 2022-12-11 DIAGNOSIS — I10 BENIGN ESSENTIAL HTN: ICD-10-CM

## 2022-12-12 RX ORDER — METOPROLOL SUCCINATE 50 MG/1
TABLET, EXTENDED RELEASE ORAL
Qty: 180 TABLET | Refills: 1 | Status: SHIPPED | OUTPATIENT
Start: 2022-12-12

## 2023-01-08 DIAGNOSIS — E78.5 HYPERLIPIDEMIA, UNSPECIFIED HYPERLIPIDEMIA TYPE: ICD-10-CM

## 2023-01-09 RX ORDER — ATORVASTATIN CALCIUM 10 MG/1
TABLET, FILM COATED ORAL
Qty: 90 TABLET | Refills: 1 | Status: SHIPPED | OUTPATIENT
Start: 2023-01-09

## 2023-05-17 ENCOUNTER — ORDER TRANSCRIPTION (OUTPATIENT)
Dept: ADMINISTRATIVE | Facility: HOSPITAL | Age: 48
End: 2023-05-17

## 2023-05-17 ENCOUNTER — LAB ENCOUNTER (OUTPATIENT)
Dept: LAB | Age: 48
End: 2023-05-17
Attending: INTERNAL MEDICINE
Payer: COMMERCIAL

## 2023-05-17 ENCOUNTER — OFFICE VISIT (OUTPATIENT)
Dept: INTERNAL MEDICINE CLINIC | Facility: CLINIC | Age: 48
End: 2023-05-17
Payer: COMMERCIAL

## 2023-05-17 VITALS
SYSTOLIC BLOOD PRESSURE: 138 MMHG | OXYGEN SATURATION: 98 % | DIASTOLIC BLOOD PRESSURE: 80 MMHG | RESPIRATION RATE: 23 BRPM | HEART RATE: 80 BPM | WEIGHT: 176 LBS | HEIGHT: 62.5 IN | TEMPERATURE: 98 F | BODY MASS INDEX: 31.58 KG/M2

## 2023-05-17 DIAGNOSIS — I10 BENIGN ESSENTIAL HTN: Primary | ICD-10-CM

## 2023-05-17 DIAGNOSIS — Z00.00 PHYSICAL EXAM, ANNUAL: ICD-10-CM

## 2023-05-17 DIAGNOSIS — Z13.6 SCREENING FOR CARDIOVASCULAR CONDITION: Primary | ICD-10-CM

## 2023-05-17 DIAGNOSIS — R73.01 IMPAIRED FASTING GLUCOSE: ICD-10-CM

## 2023-05-17 DIAGNOSIS — R07.89 LEFT-SIDED CHEST WALL PAIN: ICD-10-CM

## 2023-05-17 DIAGNOSIS — E78.5 HYPERLIPIDEMIA, UNSPECIFIED HYPERLIPIDEMIA TYPE: ICD-10-CM

## 2023-05-17 PROBLEM — K61.1 PERIRECTAL ABSCESS: Status: RESOLVED | Noted: 2021-04-09 | Resolved: 2023-05-17

## 2023-05-17 PROBLEM — K80.20 SYMPTOMATIC CHOLELITHIASIS: Status: RESOLVED | Noted: 2021-03-24 | Resolved: 2023-05-17

## 2023-05-17 PROBLEM — K83.8 DILATED BILE DUCT: Status: RESOLVED | Noted: 2021-03-28 | Resolved: 2023-05-17

## 2023-05-17 PROBLEM — Z90.49 HISTORY OF CHOLECYSTECTOMY: Status: ACTIVE | Noted: 2023-05-17

## 2023-05-17 LAB
ALBUMIN SERPL-MCNC: 4.6 G/DL (ref 3.4–5)
ALBUMIN/GLOB SERPL: 1.2 {RATIO} (ref 1–2)
ALP LIVER SERPL-CCNC: 54 U/L
ALT SERPL-CCNC: 72 U/L
ANION GAP SERPL CALC-SCNC: 4 MMOL/L (ref 0–18)
AST SERPL-CCNC: 33 U/L (ref 15–37)
BASOPHILS # BLD AUTO: 0.07 X10(3) UL (ref 0–0.2)
BASOPHILS NFR BLD AUTO: 0.8 %
BILIRUB SERPL-MCNC: 1.1 MG/DL (ref 0.1–2)
BUN BLD-MCNC: 14 MG/DL (ref 7–18)
CALCIUM BLD-MCNC: 9.6 MG/DL (ref 8.5–10.1)
CHLORIDE SERPL-SCNC: 106 MMOL/L (ref 98–112)
CHOLEST SERPL-MCNC: 202 MG/DL (ref ?–200)
CO2 SERPL-SCNC: 27 MMOL/L (ref 21–32)
CREAT BLD-MCNC: 1.11 MG/DL
EOSINOPHIL # BLD AUTO: 0.16 X10(3) UL (ref 0–0.7)
EOSINOPHIL NFR BLD AUTO: 1.8 %
ERYTHROCYTE [DISTWIDTH] IN BLOOD BY AUTOMATED COUNT: 15.7 %
FASTING PATIENT LIPID ANSWER: YES
FASTING STATUS PATIENT QL REPORTED: YES
FOLATE SERPL-MCNC: 25.2 NG/ML (ref 8.7–?)
GFR SERPLBLD BASED ON 1.73 SQ M-ARVRAT: 82 ML/MIN/1.73M2 (ref 60–?)
GLOBULIN PLAS-MCNC: 3.7 G/DL (ref 2.8–4.4)
GLUCOSE BLD-MCNC: 101 MG/DL (ref 70–99)
HCT VFR BLD AUTO: 39.8 %
HDLC SERPL-MCNC: 48 MG/DL (ref 40–59)
HGB BLD-MCNC: 13 G/DL
IMM GRANULOCYTES # BLD AUTO: 0.05 X10(3) UL (ref 0–1)
IMM GRANULOCYTES NFR BLD: 0.6 %
LDLC SERPL CALC-MCNC: 115 MG/DL (ref ?–100)
LYMPHOCYTES # BLD AUTO: 2.07 X10(3) UL (ref 1–4)
LYMPHOCYTES NFR BLD AUTO: 23.6 %
MCH RBC QN AUTO: 32.4 PG (ref 26–34)
MCHC RBC AUTO-ENTMCNC: 32.7 G/DL (ref 31–37)
MCV RBC AUTO: 99.3 FL
MONOCYTES # BLD AUTO: 0.59 X10(3) UL (ref 0.1–1)
MONOCYTES NFR BLD AUTO: 6.7 %
NEUTROPHILS # BLD AUTO: 5.84 X10 (3) UL (ref 1.5–7.7)
NEUTROPHILS # BLD AUTO: 5.84 X10(3) UL (ref 1.5–7.7)
NEUTROPHILS NFR BLD AUTO: 66.5 %
NONHDLC SERPL-MCNC: 154 MG/DL (ref ?–130)
OSMOLALITY SERPL CALC.SUM OF ELEC: 285 MOSM/KG (ref 275–295)
PLATELET # BLD AUTO: 279 10(3)UL (ref 150–450)
POTASSIUM SERPL-SCNC: 3.8 MMOL/L (ref 3.5–5.1)
PROT SERPL-MCNC: 8.3 G/DL (ref 6.4–8.2)
RBC # BLD AUTO: 4.01 X10(6)UL
SODIUM SERPL-SCNC: 137 MMOL/L (ref 136–145)
TRIGL SERPL-MCNC: 225 MG/DL (ref 30–149)
VIT B12 SERPL-MCNC: 1154 PG/ML (ref 193–986)
VLDLC SERPL CALC-MCNC: 39 MG/DL (ref 0–30)
WBC # BLD AUTO: 8.8 X10(3) UL (ref 4–11)

## 2023-05-17 PROCEDURE — 3075F SYST BP GE 130 - 139MM HG: CPT | Performed by: INTERNAL MEDICINE

## 2023-05-17 PROCEDURE — 99214 OFFICE O/P EST MOD 30 MIN: CPT | Performed by: INTERNAL MEDICINE

## 2023-05-17 PROCEDURE — 3079F DIAST BP 80-89 MM HG: CPT | Performed by: INTERNAL MEDICINE

## 2023-05-17 PROCEDURE — 82607 VITAMIN B-12: CPT | Performed by: INTERNAL MEDICINE

## 2023-05-17 PROCEDURE — 80053 COMPREHEN METABOLIC PANEL: CPT | Performed by: INTERNAL MEDICINE

## 2023-05-17 PROCEDURE — 3008F BODY MASS INDEX DOCD: CPT | Performed by: INTERNAL MEDICINE

## 2023-05-17 PROCEDURE — 80061 LIPID PANEL: CPT | Performed by: INTERNAL MEDICINE

## 2023-05-17 PROCEDURE — 82746 ASSAY OF FOLIC ACID SERUM: CPT | Performed by: INTERNAL MEDICINE

## 2023-05-17 PROCEDURE — 85025 COMPLETE CBC W/AUTO DIFF WBC: CPT | Performed by: INTERNAL MEDICINE

## 2023-05-17 RX ORDER — AMLODIPINE BESYLATE 5 MG/1
5 TABLET ORAL DAILY
COMMUNITY
Start: 2023-01-08

## 2023-05-18 DIAGNOSIS — R79.89 ELEVATED LFTS: Primary | ICD-10-CM

## 2023-05-21 ENCOUNTER — HOSPITAL ENCOUNTER (OUTPATIENT)
Dept: CT IMAGING | Age: 48
Discharge: HOME | End: 2023-05-21
Attending: INTERNAL MEDICINE

## 2023-05-21 DIAGNOSIS — Z13.6 SCREENING FOR CARDIOVASCULAR CONDITION: ICD-10-CM

## 2023-05-22 DIAGNOSIS — I77.810 DILATATION OF THORACIC AORTA (HCC): Primary | ICD-10-CM

## 2023-05-26 ENCOUNTER — TELEPHONE (OUTPATIENT)
Dept: INTERNAL MEDICINE CLINIC | Facility: CLINIC | Age: 48
End: 2023-05-26

## 2023-05-26 NOTE — TELEPHONE ENCOUNTER
CTA gated thoracic aorta approved from 5/26-7/24. Pt notified via [employer]. Sent for scanning.     Future Appointments   Date Time Provider Behzad Pollack   5/31/2023  8:45 AM [facility] CT MAIN RM4 [address]   7/7/2023  8:00 AM Rickie Helms MD [facility]

## 2023-05-26 NOTE — TELEPHONE ENCOUNTER
Incoming (mail or fax):  fax  Received from:  [address] [place] [address]  Documentation given to:  Triage incoming    CT scan of blood vessels of chest with contrast - approved

## 2023-05-26 NOTE — IMAGING NOTE
Left message for pt re CTA gated thoracic. Detailed instructions left with arrival time 0830, hold all caffeine, hydrate well orally, and take all meds as prescribed. Call back # left if questions.

## 2023-05-31 ENCOUNTER — HOSPITAL ENCOUNTER (OUTPATIENT)
Dept: CT IMAGING | Facility: HOSPITAL | Age: 48
Discharge: HOME | End: 2023-05-31
Attending: INTERNAL MEDICINE
Payer: COMMERCIAL

## 2023-05-31 VITALS — SYSTOLIC BLOOD PRESSURE: 128 MMHG | DIASTOLIC BLOOD PRESSURE: 74 MMHG | HEART RATE: 72 BPM

## 2023-05-31 DIAGNOSIS — I77.810 DILATATION OF THORACIC AORTA (HCC): ICD-10-CM

## 2023-05-31 PROCEDURE — 71275 CT ANGIOGRAPHY CHEST: CPT | Performed by: INTERNAL MEDICINE

## 2023-05-31 NOTE — IMAGING NOTE
Guilherme Ngo to CT Rm 4 GE. Positioned pt on table. Procedure explained and questions answered. Vital signs monitored and noted in Flowsheet. GFR = 82  Contrast injected followed by saline flush at 0924  Contrast = 80ml  0.9 NS flush = 75ml  Average HR = 70    Patient tolerated the procedure without complication. Denies any contrast reaction. Discontinued IV saline lock. Escorted pt to Men's Dressing Room and discharged in stable condition.

## 2023-06-05 DIAGNOSIS — I10 BENIGN ESSENTIAL HTN: ICD-10-CM

## 2023-06-05 RX ORDER — METOPROLOL SUCCINATE 50 MG/1
50 TABLET, EXTENDED RELEASE ORAL 2 TIMES DAILY
Qty: 180 TABLET | Refills: 1 | Status: SHIPPED | OUTPATIENT
Start: 2023-06-05

## 2023-06-05 RX ORDER — METOPROLOL SUCCINATE 50 MG/1
TABLET, EXTENDED RELEASE ORAL
Qty: 180 TABLET | Refills: 0 | OUTPATIENT
Start: 2023-06-05

## 2023-06-05 NOTE — TELEPHONE ENCOUNTER
Hypertension Medications Protocol Passed 06/05/2023 01:32 PM   Protocol Details  CMP or BMP in past 12 months    Last serum creatinine< 2.0    Appointment in past 6 or next 3 months   1. Benign essential HTN  Continue meds. Future Appointments   Date Time Provider Behzad Pollack   7/7/2023  8:00 AM Kermit Andujar MD [facility]   Refilled per protocol.

## 2023-07-04 DIAGNOSIS — E78.5 HYPERLIPIDEMIA, UNSPECIFIED HYPERLIPIDEMIA TYPE: ICD-10-CM

## 2023-07-04 DIAGNOSIS — I10 BENIGN ESSENTIAL HTN: ICD-10-CM

## 2023-07-06 ENCOUNTER — LAB ENCOUNTER (OUTPATIENT)
Dept: LAB | Age: 48
End: 2023-07-06
Attending: INTERNAL MEDICINE
Payer: COMMERCIAL

## 2023-07-06 DIAGNOSIS — E78.5 HYPERLIPIDEMIA, UNSPECIFIED HYPERLIPIDEMIA TYPE: ICD-10-CM

## 2023-07-06 LAB
ALBUMIN SERPL-MCNC: 4.1 G/DL (ref 3.4–5)
ALP LIVER SERPL-CCNC: 58 U/L
ALT SERPL-CCNC: 41 U/L
AST SERPL-CCNC: 20 U/L (ref 15–37)
BILIRUB DIRECT SERPL-MCNC: 0.2 MG/DL (ref 0–0.2)
BILIRUB SERPL-MCNC: 1.3 MG/DL (ref 0.1–2)
PROT SERPL-MCNC: 7.6 G/DL (ref 6.4–8.2)

## 2023-07-06 PROCEDURE — 80076 HEPATIC FUNCTION PANEL: CPT | Performed by: INTERNAL MEDICINE

## 2023-07-06 NOTE — TELEPHONE ENCOUNTER
Patient would like medication refills to be more in quantity. Informed patient to discuss with Dr Obdulia Knutson in [address] visit.

## 2023-07-07 ENCOUNTER — OFFICE VISIT (OUTPATIENT)
Dept: INTERNAL MEDICINE CLINIC | Facility: CLINIC | Age: 48
End: 2023-07-07
Payer: COMMERCIAL

## 2023-07-07 VITALS
BODY MASS INDEX: 31.47 KG/M2 | HEART RATE: 72 BPM | WEIGHT: 171 LBS | HEIGHT: 62 IN | TEMPERATURE: 98 F | SYSTOLIC BLOOD PRESSURE: 124 MMHG | RESPIRATION RATE: 20 BRPM | OXYGEN SATURATION: 98 % | DIASTOLIC BLOOD PRESSURE: 80 MMHG

## 2023-07-07 DIAGNOSIS — I77.810 DILATATION OF THORACIC AORTA (HCC): ICD-10-CM

## 2023-07-07 DIAGNOSIS — E78.5 HYPERLIPIDEMIA, UNSPECIFIED HYPERLIPIDEMIA TYPE: ICD-10-CM

## 2023-07-07 DIAGNOSIS — I10 ESSENTIAL HYPERTENSION: ICD-10-CM

## 2023-07-07 DIAGNOSIS — Z12.11 SCREENING FOR COLON CANCER: ICD-10-CM

## 2023-07-07 DIAGNOSIS — R93.1 AGATSTON CAC SCORE 100-199: ICD-10-CM

## 2023-07-07 DIAGNOSIS — Z00.00 PHYSICAL EXAM, ANNUAL: Primary | ICD-10-CM

## 2023-07-07 PROCEDURE — 99214 OFFICE O/P EST MOD 30 MIN: CPT | Performed by: INTERNAL MEDICINE

## 2023-07-07 PROCEDURE — 3074F SYST BP LT 130 MM HG: CPT | Performed by: INTERNAL MEDICINE

## 2023-07-07 PROCEDURE — 3008F BODY MASS INDEX DOCD: CPT | Performed by: INTERNAL MEDICINE

## 2023-07-07 PROCEDURE — 3079F DIAST BP 80-89 MM HG: CPT | Performed by: INTERNAL MEDICINE

## 2023-07-07 PROCEDURE — 99396 PREV VISIT EST AGE 40-64: CPT | Performed by: INTERNAL MEDICINE

## 2023-07-07 RX ORDER — AMLODIPINE BESYLATE 10 MG/1
10 TABLET ORAL DAILY
COMMUNITY
Start: 2023-06-03 | End: 2023-07-07

## 2023-07-07 RX ORDER — ATORVASTATIN CALCIUM 20 MG/1
20 TABLET, FILM COATED ORAL NIGHTLY
Qty: 90 TABLET | Refills: 1 | Status: SHIPPED | OUTPATIENT
Start: 2023-07-07

## 2023-07-07 RX ORDER — AMLODIPINE BESYLATE 10 MG/1
10 TABLET ORAL DAILY
Qty: 90 TABLET | Refills: 1 | Status: SHIPPED | OUTPATIENT
Start: 2023-07-07

## 2023-07-09 PROCEDURE — 82274 ASSAY TEST FOR BLOOD FECAL: CPT | Performed by: INTERNAL MEDICINE

## 2023-07-10 RX ORDER — AMLODIPINE BESYLATE 10 MG/1
TABLET ORAL
Qty: 90 TABLET | Refills: 0 | OUTPATIENT
Start: 2023-07-10

## 2023-07-10 RX ORDER — ATORVASTATIN CALCIUM 10 MG/1
TABLET, FILM COATED ORAL
Qty: 90 TABLET | Refills: 0 | OUTPATIENT
Start: 2023-07-10

## 2023-07-10 RX ORDER — ATORVASTATIN CALCIUM 10 MG/1
10 TABLET, FILM COATED ORAL NIGHTLY
Qty: 90 TABLET | Refills: 1 | OUTPATIENT
Start: 2023-07-10

## 2023-07-10 RX ORDER — AMLODIPINE BESYLATE 5 MG/1
5 TABLET ORAL DAILY
Refills: 0 | OUTPATIENT
Start: 2023-07-10

## 2023-07-10 RX ORDER — METOPROLOL SUCCINATE 50 MG/1
50 TABLET, EXTENDED RELEASE ORAL 2 TIMES DAILY
Qty: 180 TABLET | Refills: 1 | OUTPATIENT
Start: 2023-07-10

## 2023-07-12 LAB — HEMOCCULT STL QL: NEGATIVE

## 2023-09-25 DIAGNOSIS — I10 BENIGN ESSENTIAL HTN: ICD-10-CM

## 2023-09-26 RX ORDER — AMLODIPINE BESYLATE 5 MG/1
TABLET ORAL
Qty: 90 TABLET | Refills: 0 | OUTPATIENT
Start: 2023-09-26

## 2023-09-26 NOTE — TELEPHONE ENCOUNTER
Refill request denied. Patient currently taking 10 mg daily. #90 with 1 refill sent on 7.7. 23. rx denied.

## 2023-11-28 ENCOUNTER — LAB ENCOUNTER (OUTPATIENT)
Dept: LAB | Age: 48
End: 2023-11-28
Attending: INTERNAL MEDICINE
Payer: COMMERCIAL

## 2023-11-28 LAB
ALBUMIN SERPL-MCNC: 4.4 G/DL (ref 3.4–5)
ALBUMIN/GLOB SERPL: 1.1 {RATIO} (ref 1–2)
ALP LIVER SERPL-CCNC: 67 U/L
ALT SERPL-CCNC: 56 U/L
ANION GAP SERPL CALC-SCNC: 8 MMOL/L (ref 0–18)
AST SERPL-CCNC: 25 U/L (ref 15–37)
BASOPHILS # BLD AUTO: 0.09 X10(3) UL (ref 0–0.2)
BASOPHILS NFR BLD AUTO: 1.2 %
BILIRUB SERPL-MCNC: 1.2 MG/DL (ref 0.1–2)
BUN BLD-MCNC: 13 MG/DL (ref 9–23)
CALCIUM BLD-MCNC: 9 MG/DL (ref 8.5–10.1)
CHLORIDE SERPL-SCNC: 106 MMOL/L (ref 98–112)
CHOLEST SERPL-MCNC: 191 MG/DL (ref ?–200)
CO2 SERPL-SCNC: 26 MMOL/L (ref 21–32)
CREAT BLD-MCNC: 1.24 MG/DL
EGFRCR SERPLBLD CKD-EPI 2021: 72 ML/MIN/1.73M2 (ref 60–?)
EOSINOPHIL # BLD AUTO: 0.2 X10(3) UL (ref 0–0.7)
EOSINOPHIL NFR BLD AUTO: 2.7 %
ERYTHROCYTE [DISTWIDTH] IN BLOOD BY AUTOMATED COUNT: 15.9 %
EST. AVERAGE GLUCOSE BLD GHB EST-MCNC: 88 MG/DL (ref 68–126)
FASTING PATIENT LIPID ANSWER: YES
FASTING STATUS PATIENT QL REPORTED: YES
GLOBULIN PLAS-MCNC: 3.9 G/DL (ref 2.8–4.4)
GLUCOSE BLD-MCNC: 102 MG/DL (ref 70–99)
HBA1C MFR BLD: 4.7 % (ref ?–5.7)
HCT VFR BLD AUTO: 40.4 %
HDLC SERPL-MCNC: 47 MG/DL (ref 40–59)
HGB BLD-MCNC: 13.4 G/DL
IMM GRANULOCYTES # BLD AUTO: 0.04 X10(3) UL (ref 0–1)
IMM GRANULOCYTES NFR BLD: 0.5 %
LDLC SERPL CALC-MCNC: 102 MG/DL (ref ?–100)
LYMPHOCYTES # BLD AUTO: 2.37 X10(3) UL (ref 1–4)
LYMPHOCYTES NFR BLD AUTO: 32.3 %
MCH RBC QN AUTO: 32.4 PG (ref 26–34)
MCHC RBC AUTO-ENTMCNC: 33.2 G/DL (ref 31–37)
MCV RBC AUTO: 97.8 FL
MONOCYTES # BLD AUTO: 0.53 X10(3) UL (ref 0.1–1)
MONOCYTES NFR BLD AUTO: 7.2 %
NEUTROPHILS # BLD AUTO: 4.1 X10 (3) UL (ref 1.5–7.7)
NEUTROPHILS # BLD AUTO: 4.1 X10(3) UL (ref 1.5–7.7)
NEUTROPHILS NFR BLD AUTO: 56.1 %
NONHDLC SERPL-MCNC: 144 MG/DL (ref ?–130)
OSMOLALITY SERPL CALC.SUM OF ELEC: 290 MOSM/KG (ref 275–295)
PLATELET # BLD AUTO: 294 10(3)UL (ref 150–450)
POTASSIUM SERPL-SCNC: 4.2 MMOL/L (ref 3.5–5.1)
PROT SERPL-MCNC: 8.3 G/DL (ref 6.4–8.2)
RBC # BLD AUTO: 4.13 X10(6)UL
SODIUM SERPL-SCNC: 140 MMOL/L (ref 136–145)
TRIGL SERPL-MCNC: 249 MG/DL (ref 30–149)
TSI SER-ACNC: 2.36 MIU/ML (ref 0.36–3.74)
VLDLC SERPL CALC-MCNC: 42 MG/DL (ref 0–30)
WBC # BLD AUTO: 7.3 X10(3) UL (ref 4–11)

## 2023-11-28 PROCEDURE — 80050 GENERAL HEALTH PANEL: CPT | Performed by: INTERNAL MEDICINE

## 2023-11-28 PROCEDURE — 83036 HEMOGLOBIN GLYCOSYLATED A1C: CPT | Performed by: INTERNAL MEDICINE

## 2023-11-28 PROCEDURE — 80061 LIPID PANEL: CPT | Performed by: INTERNAL MEDICINE

## 2023-12-15 DIAGNOSIS — I10 BENIGN ESSENTIAL HTN: ICD-10-CM

## 2023-12-15 RX ORDER — METOPROLOL SUCCINATE 50 MG/1
50 TABLET, EXTENDED RELEASE ORAL 2 TIMES DAILY
Qty: 180 TABLET | Refills: 0 | OUTPATIENT
Start: 2023-12-15

## 2023-12-15 RX ORDER — METOPROLOL SUCCINATE 50 MG/1
50 TABLET, EXTENDED RELEASE ORAL 2 TIMES DAILY
Qty: 180 TABLET | Refills: 0 | Status: SHIPPED | OUTPATIENT
Start: 2023-12-15

## 2023-12-15 NOTE — TELEPHONE ENCOUNTER
Hypertension Medications Protocol Pzljnv24/15/2023 11:19 AM   Protocol Details CMP or BMP in past 12 months    Last serum creatinine< 2.0    Appointment in past 6 or next 3 months      3. Essential hypertension  Continue amlodipine and metoprolol. No future appointments.

## 2024-01-13 DIAGNOSIS — E78.5 HYPERLIPIDEMIA, UNSPECIFIED HYPERLIPIDEMIA TYPE: ICD-10-CM

## 2024-01-15 RX ORDER — ATORVASTATIN CALCIUM 20 MG/1
20 TABLET, FILM COATED ORAL NIGHTLY
Qty: 90 TABLET | Refills: 0 | Status: SHIPPED | OUTPATIENT
Start: 2024-01-15

## 2024-03-06 DIAGNOSIS — I10 ESSENTIAL HYPERTENSION: ICD-10-CM

## 2024-03-06 DIAGNOSIS — I10 BENIGN ESSENTIAL HTN: ICD-10-CM

## 2024-03-06 DIAGNOSIS — E78.5 HYPERLIPIDEMIA, UNSPECIFIED HYPERLIPIDEMIA TYPE: ICD-10-CM

## 2024-03-06 RX ORDER — AMLODIPINE BESYLATE 10 MG/1
10 TABLET ORAL DAILY
Qty: 30 TABLET | Refills: 0 | Status: SHIPPED | OUTPATIENT
Start: 2024-03-06 | End: 2024-04-10

## 2024-03-06 RX ORDER — METOPROLOL SUCCINATE 50 MG/1
50 TABLET, EXTENDED RELEASE ORAL 2 TIMES DAILY
Qty: 30 TABLET | Refills: 0 | Status: SHIPPED | OUTPATIENT
Start: 2024-03-06

## 2024-03-06 RX ORDER — ATORVASTATIN CALCIUM 20 MG/1
20 TABLET, FILM COATED ORAL NIGHTLY
Qty: 30 TABLET | Refills: 0 | Status: SHIPPED | OUTPATIENT
Start: 2024-03-06

## 2024-03-06 RX ORDER — AMLODIPINE BESYLATE 10 MG/1
10 TABLET ORAL DAILY
Qty: 30 TABLET | Refills: 0 | OUTPATIENT
Start: 2024-03-06

## 2024-03-06 NOTE — TELEPHONE ENCOUNTER
Hypertension Medications Protocol Passed03/06/2024 11:48 AM   Protocol Details CMP or BMP in past 12 months    Last BP reading less than 140/90    In person appointment or virtual visit in the past 12 mos or appointment in next 3 mos    EGFRCR or GFRNAA > 50       No future appointments.      Front dest, please call the pt also to make appt, thank you

## 2024-03-06 NOTE — TELEPHONE ENCOUNTER
Hypertension Medications Protocol [id number]/06/2024 12:15 PM   Protocol Details CMP or BMP in past 12 months    Last BP reading less than 140/90    In person appointment or virtual visit in the past 12 mos or appointment in next 3 mos    EGFRCR or GFRNAA > 50   Refill sent but pt reminded he is overdue for apt. See other refill TE that was routed to front office to schedule apt.

## 2024-03-07 ENCOUNTER — TELEPHONE (OUTPATIENT)
Dept: INTERNAL MEDICINE CLINIC | Facility: CLINIC | Age: 49
End: 2024-03-07

## 2024-03-07 DIAGNOSIS — E78.5 HYPERLIPIDEMIA, UNSPECIFIED HYPERLIPIDEMIA TYPE: Primary | ICD-10-CM

## 2024-03-07 DIAGNOSIS — I10 BENIGN ESSENTIAL HTN: ICD-10-CM

## 2024-03-07 DIAGNOSIS — R73.01 IMPAIRED FASTING GLUCOSE: ICD-10-CM

## 2024-03-07 NOTE — TELEPHONE ENCOUNTER
Patient requesting labs to be ordered so that they can be resulted by OV 4/23/24 with Dr Landaverde.

## 2024-03-08 ENCOUNTER — TELEPHONE (OUTPATIENT)
Dept: INTERNAL MEDICINE CLINIC | Facility: CLINIC | Age: 49
End: 2024-03-08

## 2024-03-08 NOTE — TELEPHONE ENCOUNTER
All of these look up to date- pt had PE in July and vaccinations in December. Referred to GI and dit FIT in July. Please advise if anything else needed, thank you.  Future Appointments   Date Time Provider Department Center   4/23/2024  8:40 AM Ftaou Landaverde MD [facility] [place]

## 2024-03-08 NOTE — TELEPHONE ENCOUNTER
Patient c/b and states he will use [facility] [address]     HOWEVER, patient was informed I would remove [facility] from preferred labs in his chart and he demanded that I leave [facility] there.

## 2024-03-08 NOTE — TELEPHONE ENCOUNTER
Marianne from [facility] phoned to give care coordination information. She states the patient has care gaps and needs a flu and covid vaccine, an AWV, and a colon cancer screening.    Marianne's contact # [phone] it is confidential vm.

## 2024-04-10 DIAGNOSIS — E78.5 HYPERLIPIDEMIA, UNSPECIFIED HYPERLIPIDEMIA TYPE: ICD-10-CM

## 2024-04-10 DIAGNOSIS — I10 ESSENTIAL HYPERTENSION: ICD-10-CM

## 2024-04-10 DIAGNOSIS — I10 BENIGN ESSENTIAL HTN: ICD-10-CM

## 2024-04-11 DIAGNOSIS — I10 BENIGN ESSENTIAL HTN: ICD-10-CM

## 2024-04-11 RX ORDER — AMLODIPINE BESYLATE 10 MG/1
10 TABLET ORAL DAILY
Qty: 30 TABLET | Refills: 0 | Status: SHIPPED | OUTPATIENT
Start: 2024-04-11

## 2024-04-11 RX ORDER — METOPROLOL SUCCINATE 50 MG/1
50 TABLET, EXTENDED RELEASE ORAL 2 TIMES DAILY
Qty: 30 TABLET | Refills: 0 | Status: SHIPPED | OUTPATIENT
Start: 2024-04-11

## 2024-04-11 RX ORDER — METOPROLOL SUCCINATE 50 MG/1
50 TABLET, EXTENDED RELEASE ORAL 2 TIMES DAILY
Qty: 30 TABLET | Refills: 0 | OUTPATIENT
Start: 2024-04-11

## 2024-04-11 RX ORDER — ATORVASTATIN CALCIUM 20 MG/1
20 TABLET, FILM COATED ORAL NIGHTLY
Qty: 30 TABLET | Refills: 0 | Status: SHIPPED | OUTPATIENT
Start: 2024-04-11

## 2024-04-11 NOTE — TELEPHONE ENCOUNTER
Hypertension Medications Protocol Vcgujb13/10/2024 12:49 PM   Protocol Details CMP or BMP in past 12 months    Last BP reading less than 140/90    In person appointment or virtual visit in the past 12 mos or appointment in next 3 mos    EGFRCR or GFRNAA > 50     Cholesterol Medication Protocol Qvaqrz38/10/2024 12:49 PM   Protocol Details ALT < 80    ALT resulted within past year    Lipid panel within past 12 months    In person appointment or virtual visit in the past 12 mos or appointment in next 3 mos       Future Appointments   Date Time Provider Department Center   4/23/2024  8:40 AM Fatou Landaverde MD [facility] [place]

## 2024-04-12 RX ORDER — METOPROLOL SUCCINATE 50 MG/1
50 TABLET, EXTENDED RELEASE ORAL 2 TIMES DAILY
Qty: 30 TABLET | Refills: 0 | OUTPATIENT
Start: 2024-04-12

## 2024-04-22 ENCOUNTER — LAB ENCOUNTER (OUTPATIENT)
Dept: LAB | Age: 49
End: 2024-04-22
Attending: INTERNAL MEDICINE
Payer: COMMERCIAL

## 2024-04-22 LAB
ALBUMIN SERPL-MCNC: 4.4 G/DL (ref 3.4–5)
ALBUMIN/GLOB SERPL: 1.1 {RATIO} (ref 1–2)
ALP LIVER SERPL-CCNC: 64 U/L
ALT SERPL-CCNC: 47 U/L
ANION GAP SERPL CALC-SCNC: 7 MMOL/L (ref 0–18)
AST SERPL-CCNC: 20 U/L (ref 15–37)
BILIRUB SERPL-MCNC: 1.6 MG/DL (ref 0.1–2)
BUN BLD-MCNC: 11 MG/DL (ref 9–23)
CALCIUM BLD-MCNC: 9.5 MG/DL (ref 8.5–10.1)
CHLORIDE SERPL-SCNC: 104 MMOL/L (ref 98–112)
CHOLEST SERPL-MCNC: 190 MG/DL (ref ?–200)
CO2 SERPL-SCNC: 28 MMOL/L (ref 21–32)
CREAT BLD-MCNC: 1.3 MG/DL
EGFRCR SERPLBLD CKD-EPI 2021: 68 ML/MIN/1.73M2 (ref 60–?)
FASTING PATIENT LIPID ANSWER: YES
FASTING STATUS PATIENT QL REPORTED: YES
GLOBULIN PLAS-MCNC: 3.9 G/DL (ref 2.8–4.4)
GLUCOSE BLD-MCNC: 99 MG/DL (ref 70–99)
HDLC SERPL-MCNC: 48 MG/DL (ref 40–59)
LDLC SERPL CALC-MCNC: 111 MG/DL (ref ?–100)
NONHDLC SERPL-MCNC: 142 MG/DL (ref ?–130)
OSMOLALITY SERPL CALC.SUM OF ELEC: 287 MOSM/KG (ref 275–295)
POTASSIUM SERPL-SCNC: 4.2 MMOL/L (ref 3.5–5.1)
PROT SERPL-MCNC: 8.3 G/DL (ref 6.4–8.2)
SODIUM SERPL-SCNC: 139 MMOL/L (ref 136–145)
TRIGL SERPL-MCNC: 176 MG/DL (ref 30–149)
VLDLC SERPL CALC-MCNC: 30 MG/DL (ref 0–30)

## 2024-04-22 PROCEDURE — 80053 COMPREHEN METABOLIC PANEL: CPT | Performed by: INTERNAL MEDICINE

## 2024-04-22 PROCEDURE — 80061 LIPID PANEL: CPT | Performed by: INTERNAL MEDICINE

## 2024-04-23 ENCOUNTER — OFFICE VISIT (OUTPATIENT)
Dept: INTERNAL MEDICINE CLINIC | Facility: CLINIC | Age: 49
End: 2024-04-23
Payer: COMMERCIAL

## 2024-04-23 VITALS
WEIGHT: 172.5 LBS | HEIGHT: 62.25 IN | RESPIRATION RATE: 16 BRPM | HEART RATE: 80 BPM | SYSTOLIC BLOOD PRESSURE: 130 MMHG | BODY MASS INDEX: 31.34 KG/M2 | TEMPERATURE: 97 F | DIASTOLIC BLOOD PRESSURE: 84 MMHG

## 2024-04-23 DIAGNOSIS — R93.1 AGATSTON CAC SCORE 100-199: ICD-10-CM

## 2024-04-23 DIAGNOSIS — I10 ESSENTIAL HYPERTENSION: ICD-10-CM

## 2024-04-23 DIAGNOSIS — Z00.00 PHYSICAL EXAM, ANNUAL: ICD-10-CM

## 2024-04-23 DIAGNOSIS — I77.810 DILATATION OF THORACIC AORTA (HCC): ICD-10-CM

## 2024-04-23 DIAGNOSIS — E78.5 HYPERLIPIDEMIA, UNSPECIFIED HYPERLIPIDEMIA TYPE: ICD-10-CM

## 2024-04-23 PROCEDURE — 99214 OFFICE O/P EST MOD 30 MIN: CPT | Performed by: INTERNAL MEDICINE

## 2024-04-23 PROCEDURE — 3008F BODY MASS INDEX DOCD: CPT | Performed by: INTERNAL MEDICINE

## 2024-04-23 PROCEDURE — 3075F SYST BP GE 130 - 139MM HG: CPT | Performed by: INTERNAL MEDICINE

## 2024-04-23 PROCEDURE — 3079F DIAST BP 80-89 MM HG: CPT | Performed by: INTERNAL MEDICINE

## 2024-04-23 RX ORDER — AMLODIPINE BESYLATE 10 MG/1
10 TABLET ORAL DAILY
Qty: 90 TABLET | Refills: 1 | Status: SHIPPED | OUTPATIENT
Start: 2024-04-23

## 2024-04-23 RX ORDER — METOPROLOL SUCCINATE 50 MG/1
50 TABLET, EXTENDED RELEASE ORAL 2 TIMES DAILY
Qty: 90 TABLET | Refills: 1 | Status: SHIPPED | OUTPATIENT
Start: 2024-04-23

## 2024-04-23 RX ORDER — ATORVASTATIN CALCIUM 20 MG/1
20 TABLET, FILM COATED ORAL NIGHTLY
Qty: 90 TABLET | Refills: 1 | Status: SHIPPED | OUTPATIENT
Start: 2024-04-23

## 2024-04-23 NOTE — PROGRESS NOTES
[facility]    CHIEF COMPLAINT:    Chief Complaint   Patient presents with    Medication Follow-Up     7/9/23-FIT         HISTORY OF PRESENT ILLNESS:  here for med check.   Htn: Taking meds regularly. No chest pain, no shortness of breath. Does not check at home.     Hyperlipidemia: on statin. No muscle aches. Lipid panel reviewed. Elevated TG. LDL should be lower. He is not watching his diet.    Agatston score 159. On statin.     Dilated thoracic aorta: last checked 5/2023. Will need recheck. No symptoms.     He has floaters. He has seen ophthalmology last year. Recommend he see them again if the floaters have persisted.     Sometimes when it is cold his skin gets itchy. No symptoms today. Advised to rtc when having the itching to make sure there is no rash. He verbalizes understanding.       REVIEW OF SYSTEMS:  See HPI    Current Medications:    Current Outpatient Medications   Medication Sig Dispense Refill    metoprolol succinate ER 50 MG Oral Tablet 24 Hr Take 1 tablet (50 mg total) by mouth 2 (two) times daily. 90 tablet 1    atorvastatin 20 MG Oral Tab Take 1 tablet (20 mg total) by mouth nightly. TAKE AT BEDTIME 90 tablet 1    amLODIPine 10 MG Oral Tab Take 1 tablet (10 mg total) by mouth daily. 90 tablet 1    Multiple Vitamin (MULTI-VITAMIN) Oral Tab Take 1 tablet by mouth daily.         PAST MEDICAL, SOCIAL, AND FAMILY HISTORY:  Tobacco:    History   Smoking Status    Former    Types: Cigarettes   Smokeless Tobacco    Never       PHYSICAL EXAM:  /84 (BP Location: Left arm, Patient Position: Sitting, Cuff Size: large)   Pulse 80   Temp 97.4 °F (36.3 °C) (Temporal)   Resp 16   Ht 5' 2.25\" (1.581 m)   Wt 172 lb 8 oz (78.2 kg)   BMI 31.30 kg/m²    GENERAL: well developed, well nourished,in no apparent distress  LUNGS: clear to auscultation  CARDIO: RRR without murmur  GI: good BS's,no masses, HSM or tenderness  MUSCULOSKELETAL:  no edema, muscle strength normal.     DATA:  Results for  orders placed or performed in visit on 03/07/24   Lipid Panel [E]   Result Value Ref Range    Cholesterol, Total 190 <200 mg/dL    HDL Cholesterol 48 40 - 59 mg/dL    Triglycerides 176 (H) 30 - 149 mg/dL    LDL Cholesterol 111 (H) <100 mg/dL    VLDL 30 0 - 30 mg/dL    Non HDL Chol 142 (H) <130 mg/dL    Patient Fasting for Lipid? Yes    Comp Metabolic Panel (14) [E]   Result Value Ref Range    Glucose 99 70 - 99 mg/dL    Sodium 139 136 - 145 mmol/L    Potassium 4.2 3.5 - 5.1 mmol/L    Chloride 104 98 - 112 mmol/L    CO2 28.0 21.0 - 32.0 mmol/L    Anion Gap 7 0 - 18 mmol/L    BUN 11 9 - 23 mg/dL    Creatinine 1.30 0.70 - 1.30 mg/dL    Calcium, Total 9.5 8.5 - 10.1 mg/dL    Calculated Osmolality 287 275 - 295 mOsm/kg    eGFR-Cr 68 >=60 mL/min/1.73m2    AST 20 15 - 37 U/L    ALT 47 16 - 61 U/L    Alkaline Phosphatase 64 45 - 117 U/L    Bilirubin, Total 1.6 0.1 - 2.0 mg/dL    Total Protein 8.3 (H) 6.4 - 8.2 g/dL    Albumin 4.4 3.4 - 5.0 g/dL    Globulin  3.9 2.8 - 4.4 g/dL    A/G Ratio 1.1 1.0 - 2.0    Patient Fasting for CMP? Yes             ASSESSMENT AND PLAN:  1. Hyperlipidemia, unspecified hyperlipidemia type  Continue statin.   Needs low fat low cholesterol diet.   Urged regular exercise.   - atorvastatin 20 MG Oral Tab; Take 1 tablet (20 mg total) by mouth nightly. TAKE AT BEDTIME  Dispense: 90 tablet; Refill: 1    2. Essential hypertension  Continue meds.   - metoprolol succinate ER 50 MG Oral Tablet 24 Hr; Take 1 tablet (50 mg total) by mouth 2 (two) times daily.  Dispense: 90 tablet; Refill: 1  - amLODIPine 10 MG Oral Tab; Take 1 tablet (10 mg total) by mouth daily.  Dispense: 90 tablet; Refill: 1    3. Dilatation of thoracic aorta (HCC)  - CTA GATED THORACIC AORTA (CPT=71425); Future    4. Agatston CAC score 100-199  Continue statin.     5. Physical exam, annual  Labs prior to next visit.   - CBC With Differential With Platelet  - Comp Metabolic Panel  - Lipid Panel  - TSH W Reflex To Free T4        Return in  about 6 months (around 10/23/2024) for physical.      Fatou Landaverde MD

## 2024-05-15 NOTE — IMAGING NOTE
RN called the patient and instructed patient to arrive 15 minutes to CTA Gated study on 5/20. Patient instructed to park in the south parking deck and to eat a light breakfast / lunch. Patient also instructed to hydrate and hold all caffeine / decaf / coffee for 12 hours prior to scan. Patient verbalized an understanding and denies further questions.

## 2024-05-17 ENCOUNTER — TELEPHONE (OUTPATIENT)
Dept: INTERNAL MEDICINE CLINIC | Facility: CLINIC | Age: 49
End: 2024-05-17

## 2024-05-17 NOTE — TELEPHONE ENCOUNTER
Incoming (mail or fax):  fax  Received from:  [facility]  Documentation given to:  Triage incoming    Service/procedure approved - CT scan of blood vessals

## 2024-05-20 ENCOUNTER — HOSPITAL ENCOUNTER (OUTPATIENT)
Dept: CT IMAGING | Facility: HOSPITAL | Age: 49
Discharge: HOME | End: 2024-05-20
Attending: INTERNAL MEDICINE

## 2024-05-20 VITALS — HEART RATE: 84 BPM | SYSTOLIC BLOOD PRESSURE: 113 MMHG | DIASTOLIC BLOOD PRESSURE: 66 MMHG

## 2024-05-20 DIAGNOSIS — I77.810 DILATATION OF THORACIC AORTA (HCC): ICD-10-CM

## 2024-05-20 PROCEDURE — 71275 CT ANGIOGRAPHY CHEST: CPT | Performed by: INTERNAL MEDICINE

## 2024-05-20 NOTE — IMAGING NOTE
Received pt to CT 4 at 1110.  Pt verified name, , and allergies.     Contrast= 80 ml  Saline= 72 ml  Average HR= 76  Pt tolerated exam well.  Exam finished at 1114.  Pt escorted to dressing room with steady gait.

## 2024-05-30 PROBLEM — I25.10 ARTERIOSCLEROSIS OF CORONARY ARTERY: Status: ACTIVE | Noted: 2023-08-22

## 2024-05-30 PROBLEM — F43.22 ADJUSTMENT DISORDER WITH ANXIETY: Status: ACTIVE | Noted: 2023-08-21

## 2024-05-30 PROBLEM — H43.811 PVD (POSTERIOR VITREOUS DETACHMENT), RIGHT EYE: Status: ACTIVE | Noted: 2024-05-30

## 2024-05-30 PROBLEM — H40.003 PREGLAUCOMA OF BOTH EYES: Status: ACTIVE | Noted: 2024-05-30

## 2024-07-01 DIAGNOSIS — I10 ESSENTIAL HYPERTENSION: ICD-10-CM

## 2024-07-03 RX ORDER — METOPROLOL SUCCINATE 50 MG/1
50 TABLET, EXTENDED RELEASE ORAL 2 TIMES DAILY
Qty: 90 TABLET | Refills: 0 | OUTPATIENT
Start: 2024-07-03

## 2024-07-04 DIAGNOSIS — I10 ESSENTIAL HYPERTENSION: ICD-10-CM

## 2024-07-04 DIAGNOSIS — E78.5 HYPERLIPIDEMIA, UNSPECIFIED HYPERLIPIDEMIA TYPE: ICD-10-CM

## 2024-07-04 RX ORDER — METOPROLOL SUCCINATE 50 MG/1
50 TABLET, EXTENDED RELEASE ORAL 2 TIMES DAILY
Qty: 90 TABLET | Refills: 1 | Status: CANCELLED | OUTPATIENT
Start: 2024-07-04

## 2024-07-05 DIAGNOSIS — I10 ESSENTIAL HYPERTENSION: ICD-10-CM

## 2024-07-08 DIAGNOSIS — I10 ESSENTIAL HYPERTENSION: ICD-10-CM

## 2024-07-08 RX ORDER — METOPROLOL SUCCINATE 50 MG/1
50 TABLET, EXTENDED RELEASE ORAL 2 TIMES DAILY
Qty: 90 TABLET | Refills: 0 | OUTPATIENT
Start: 2024-07-08

## 2024-07-10 RX ORDER — METOPROLOL SUCCINATE 50 MG/1
50 TABLET, EXTENDED RELEASE ORAL 2 TIMES DAILY
Qty: 180 TABLET | Refills: 0 | Status: SHIPPED | OUTPATIENT
Start: 2024-07-10

## 2024-07-10 NOTE — TELEPHONE ENCOUNTER
Last OV relevant to medication: 4/23/24  Last refill date: 4/23/24 #90/refills: 1- pt takes BID, only #90 sent.  When pt was asked to return for OV: 10/23/24  Upcoming appt/reason:   Future Appointments   Date Time Provider Department Center   8/27/2024  7:00 AM Kevin Solis, DO [facility] SUB GI   8/27/2024  7:30 AM Kevin Solis, DO [facility] SUB GI   10/23/2024  9:20 AM Fatou Landaverde MD [facility] [place]   Was pt informed of any over due labs: utd  Lab Results   Component Value Date    GLU 99 04/22/2024    BUN 11 04/22/2024    BUNCREA 12.2 10/14/2020    CREATSERUM 1.30 04/22/2024    ANIONGAP 7 04/22/2024    GFRNAA 74 05/10/2022    GFRAA 85 05/10/2022    CA 9.5 04/22/2024    OSMOCALC 287 04/22/2024    ALKPHO 64 04/22/2024    AST 20 04/22/2024    ALT 47 04/22/2024    BILT 1.6 04/22/2024    TP 8.3 (H) 04/22/2024    ALB 4.4 04/22/2024    GLOBULIN 3.9 04/22/2024     04/22/2024    K 4.2 04/22/2024     04/22/2024    CO2 28.0 04/22/2024

## 2024-07-17 RX ORDER — AMLODIPINE BESYLATE 10 MG/1
10 TABLET ORAL DAILY
Qty: 90 TABLET | Refills: 1 | OUTPATIENT
Start: 2024-07-17

## 2024-07-17 RX ORDER — ATORVASTATIN CALCIUM 20 MG/1
20 TABLET, FILM COATED ORAL NIGHTLY
Qty: 90 TABLET | Refills: 1 | OUTPATIENT
Start: 2024-07-17

## 2024-08-27 PROBLEM — D12.8 BENIGN NEOPLASM OF RECTUM: Status: ACTIVE | Noted: 2024-08-27

## 2024-08-27 PROBLEM — D12.0 BENIGN NEOPLASM OF CECUM: Status: ACTIVE | Noted: 2024-08-27

## 2024-08-27 PROBLEM — R93.3 ABNORMAL CT SCAN, ESOPHAGUS: Status: ACTIVE | Noted: 2024-08-27

## 2024-08-27 PROBLEM — Z12.11 SPECIAL SCREENING FOR MALIGNANT NEOPLASMS, COLON: Status: ACTIVE | Noted: 2024-08-27

## 2024-08-27 PROBLEM — R13.10 DYSPHAGIA, UNSPECIFIED: Status: ACTIVE | Noted: 2024-08-27

## 2024-08-27 PROBLEM — K21.9 GASTROESOPHAGEAL REFLUX DISEASE: Status: ACTIVE | Noted: 2024-08-27

## 2024-08-28 DIAGNOSIS — I10 ESSENTIAL HYPERTENSION: ICD-10-CM

## 2024-08-30 RX ORDER — AMLODIPINE BESYLATE 10 MG/1
10 TABLET ORAL DAILY
Qty: 90 TABLET | Refills: 0 | Status: SHIPPED | OUTPATIENT
Start: 2024-08-30

## 2024-08-30 NOTE — TELEPHONE ENCOUNTER
Last OV relevant to medication: 4/23/24  Last refill date: 4/23/24 #/refills: 90/1  When pt was asked to return for OV: 10/23/24  Upcoming appt/reason: 10/23/24/physical  Was pt informed of any over due labs: yes  Lab Results   Component Value Date    GLU 99 04/22/2024    BUN 11 04/22/2024    BUNCREA 12.2 10/14/2020    CREATSERUM 1.30 04/22/2024    ANIONGAP 7 04/22/2024    GFRNAA 74 05/10/2022    GFRAA 85 05/10/2022    CA 9.5 04/22/2024    OSMOCALC 287 04/22/2024    ALKPHO 64 04/22/2024    AST 20 04/22/2024    ALT 47 04/22/2024    BILT 1.6 04/22/2024    TP 8.3 (H) 04/22/2024    ALB 4.4 04/22/2024    GLOBULIN 3.9 04/22/2024     04/22/2024    K 4.2 04/22/2024     04/22/2024    CO2 28.0 04/22/2024       Lab Results   Component Value Date    CHOLEST 190 04/22/2024    TRIG 176 (H) 04/22/2024    HDL 48 04/22/2024     (H) 04/22/2024    VLDL 30 04/22/2024    NONHDLC 142 (H) 04/22/2024

## 2024-09-24 DIAGNOSIS — E78.5 HYPERLIPIDEMIA, UNSPECIFIED HYPERLIPIDEMIA TYPE: ICD-10-CM

## 2024-09-24 DIAGNOSIS — I10 ESSENTIAL HYPERTENSION: ICD-10-CM

## 2024-09-26 RX ORDER — AMLODIPINE BESYLATE 10 MG/1
10 TABLET ORAL DAILY
Qty: 90 TABLET | Refills: 0 | Status: SHIPPED | OUTPATIENT
Start: 2024-09-26

## 2024-09-26 RX ORDER — METOPROLOL SUCCINATE 50 MG/1
50 TABLET, EXTENDED RELEASE ORAL 2 TIMES DAILY
Qty: 180 TABLET | Refills: 0 | Status: SHIPPED | OUTPATIENT
Start: 2024-09-26

## 2024-09-26 RX ORDER — ATORVASTATIN CALCIUM 20 MG/1
20 TABLET, FILM COATED ORAL NIGHTLY
Qty: 90 TABLET | Refills: 0 | Status: SHIPPED | OUTPATIENT
Start: 2024-09-26

## 2024-09-26 RX ORDER — AMLODIPINE BESYLATE 10 MG/1
10 TABLET ORAL DAILY
Qty: 90 TABLET | Refills: 0 | OUTPATIENT
Start: 2024-09-26

## 2024-09-26 NOTE — TELEPHONE ENCOUNTER
Last OV relevant to medication: 4/23/24  Last refill date: amlodipine- pt only got #30 to [facility], now requesting #90 to [facility]  Metoprolol 7/10/24 #180/0  When pt was asked to return for OV: 10/23/24  Upcoming appt/reason:   Future Appointments   Date Time Provider Department Center   10/23/2024  9:20 AM Fatou Landaverde MD [facility] [place]   10/29/2024  7:00 AM Kevin Solis DO [facility] SUB GI   Was pt informed of any over due labs: prior to visit  Lab Results   Component Value Date    GLU 99 04/22/2024    BUN 11 04/22/2024    BUNCREA 12.2 10/14/2020    CREATSERUM 1.30 04/22/2024    ANIONGAP 7 04/22/2024    GFRNAA 74 05/10/2022    GFRAA 85 05/10/2022    CA 9.5 04/22/2024    OSMOCALC 287 04/22/2024    ALKPHO 64 04/22/2024    AST 20 04/22/2024    ALT 47 04/22/2024    BILT 1.6 04/22/2024    TP 8.3 (H) 04/22/2024    ALB 4.4 04/22/2024    GLOBULIN 3.9 04/22/2024     04/22/2024    K 4.2 04/22/2024     04/22/2024    CO2 28.0 04/22/2024     Cholesterol Medication Protocol [id number]/24/2024 10:43 AM   Protocol Details ALT < 80    ALT resulted within past year    Lipid panel within past 12 months    In person appointment or virtual visit in the past 12 mos or appointment in next 3 mos

## 2024-10-23 ENCOUNTER — OFFICE VISIT (OUTPATIENT)
Dept: INTERNAL MEDICINE CLINIC | Facility: CLINIC | Age: 49
End: 2024-10-23
Payer: COMMERCIAL

## 2024-10-23 VITALS
WEIGHT: 172.5 LBS | TEMPERATURE: 98 F | SYSTOLIC BLOOD PRESSURE: 128 MMHG | HEART RATE: 72 BPM | HEIGHT: 62.5 IN | DIASTOLIC BLOOD PRESSURE: 80 MMHG | BODY MASS INDEX: 30.95 KG/M2 | RESPIRATION RATE: 20 BRPM | OXYGEN SATURATION: 98 %

## 2024-10-23 DIAGNOSIS — I10 ESSENTIAL HYPERTENSION: ICD-10-CM

## 2024-10-23 DIAGNOSIS — D22.9 NUMEROUS MOLES: ICD-10-CM

## 2024-10-23 DIAGNOSIS — Z00.00 PHYSICAL EXAM, ANNUAL: Primary | ICD-10-CM

## 2024-10-23 DIAGNOSIS — E78.5 HYPERLIPIDEMIA, UNSPECIFIED HYPERLIPIDEMIA TYPE: ICD-10-CM

## 2024-10-23 DIAGNOSIS — L98.9 SKIN LESION OF BACK: ICD-10-CM

## 2024-10-23 PROBLEM — F43.22 ADJUSTMENT DISORDER WITH ANXIETY: Status: RESOLVED | Noted: 2023-08-21 | Resolved: 2024-10-23

## 2024-10-23 PROBLEM — F43.23 ADJUSTMENT DISORDER WITH MIXED ANXIETY AND DEPRESSED MOOD: Status: RESOLVED | Noted: 2020-05-07 | Resolved: 2024-10-23

## 2024-10-23 PROBLEM — R13.10 DYSPHAGIA, UNSPECIFIED: Status: RESOLVED | Noted: 2024-08-27 | Resolved: 2024-10-23

## 2024-10-23 RX ORDER — ATORVASTATIN CALCIUM 20 MG/1
20 TABLET, FILM COATED ORAL NIGHTLY
Qty: 90 TABLET | Refills: 1 | Status: SHIPPED | OUTPATIENT
Start: 2024-10-23

## 2024-10-23 RX ORDER — METOPROLOL SUCCINATE 50 MG/1
50 TABLET, EXTENDED RELEASE ORAL 2 TIMES DAILY
Qty: 180 TABLET | Refills: 1 | Status: SHIPPED | OUTPATIENT
Start: 2024-10-23

## 2024-10-23 RX ORDER — AMLODIPINE BESYLATE 10 MG/1
10 TABLET ORAL DAILY
Qty: 90 TABLET | Refills: 1 | Status: SHIPPED | OUTPATIENT
Start: 2024-10-23

## 2024-10-23 RX ORDER — METOPROLOL SUCCINATE 50 MG/1
50 TABLET, EXTENDED RELEASE ORAL 2 TIMES DAILY
Qty: 60 TABLET | Refills: 0 | OUTPATIENT
Start: 2024-10-23
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
## 2024-10-29 PROBLEM — K20.0 EOSINOPHILIC ESOPHAGITIS: Status: ACTIVE | Noted: 2024-10-29

## 2024-11-22 ENCOUNTER — LAB ENCOUNTER (OUTPATIENT)
Dept: LAB | Age: 49
End: 2024-11-22
Attending: INTERNAL MEDICINE
Payer: COMMERCIAL

## 2024-11-22 LAB
ALBUMIN SERPL-MCNC: 4.7 G/DL (ref 3.2–4.8)
ALBUMIN/GLOB SERPL: 1.6 {RATIO} (ref 1–2)
ALP LIVER SERPL-CCNC: 65 U/L
ALT SERPL-CCNC: 37 U/L
ANION GAP SERPL CALC-SCNC: 7 MMOL/L (ref 0–18)
AST SERPL-CCNC: 24 U/L (ref ?–34)
BASOPHILS # BLD AUTO: 0.08 X10(3) UL (ref 0–0.2)
BASOPHILS NFR BLD AUTO: 1.3 %
BILIRUB SERPL-MCNC: 1.3 MG/DL (ref 0.3–1.2)
BUN BLD-MCNC: 18 MG/DL (ref 9–23)
CALCIUM BLD-MCNC: 9.9 MG/DL (ref 8.7–10.4)
CHLORIDE SERPL-SCNC: 108 MMOL/L (ref 98–112)
CHOLEST SERPL-MCNC: 191 MG/DL (ref ?–200)
CO2 SERPL-SCNC: 27 MMOL/L (ref 21–32)
CREAT BLD-MCNC: 1.26 MG/DL
EGFRCR SERPLBLD CKD-EPI 2021: 70 ML/MIN/1.73M2 (ref 60–?)
EOSINOPHIL # BLD AUTO: 0.12 X10(3) UL (ref 0–0.7)
EOSINOPHIL NFR BLD AUTO: 1.9 %
ERYTHROCYTE [DISTWIDTH] IN BLOOD BY AUTOMATED COUNT: 15.7 %
FASTING PATIENT LIPID ANSWER: YES
FASTING STATUS PATIENT QL REPORTED: YES
GLOBULIN PLAS-MCNC: 3 G/DL (ref 2–3.5)
GLUCOSE BLD-MCNC: 104 MG/DL (ref 70–99)
HCT VFR BLD AUTO: 36.7 %
HDLC SERPL-MCNC: 44 MG/DL (ref 40–59)
HGB BLD-MCNC: 12.8 G/DL
IMM GRANULOCYTES # BLD AUTO: 0.04 X10(3) UL (ref 0–1)
IMM GRANULOCYTES NFR BLD: 0.6 %
LDLC SERPL CALC-MCNC: 123 MG/DL (ref ?–100)
LYMPHOCYTES # BLD AUTO: 1.46 X10(3) UL (ref 1–4)
LYMPHOCYTES NFR BLD AUTO: 23.3 %
MCH RBC QN AUTO: 32.7 PG (ref 26–34)
MCHC RBC AUTO-ENTMCNC: 34.9 G/DL (ref 31–37)
MCV RBC AUTO: 93.6 FL
MONOCYTES # BLD AUTO: 0.56 X10(3) UL (ref 0.1–1)
MONOCYTES NFR BLD AUTO: 8.9 %
NEUTROPHILS # BLD AUTO: 4.01 X10 (3) UL (ref 1.5–7.7)
NEUTROPHILS # BLD AUTO: 4.01 X10(3) UL (ref 1.5–7.7)
NEUTROPHILS NFR BLD AUTO: 64 %
NONHDLC SERPL-MCNC: 147 MG/DL (ref ?–130)
OSMOLALITY SERPL CALC.SUM OF ELEC: 296 MOSM/KG (ref 275–295)
PLATELET # BLD AUTO: 267 10(3)UL (ref 150–450)
POTASSIUM SERPL-SCNC: 4.4 MMOL/L (ref 3.5–5.1)
PROT SERPL-MCNC: 7.7 G/DL (ref 5.7–8.2)
RBC # BLD AUTO: 3.92 X10(6)UL
SODIUM SERPL-SCNC: 142 MMOL/L (ref 136–145)
TRIGL SERPL-MCNC: 132 MG/DL (ref 30–149)
TSI SER-ACNC: 1.36 UIU/ML (ref 0.55–4.78)
VLDLC SERPL CALC-MCNC: 23 MG/DL (ref 0–30)
WBC # BLD AUTO: 6.3 X10(3) UL (ref 4–11)

## 2024-11-22 PROCEDURE — 80061 LIPID PANEL: CPT | Performed by: INTERNAL MEDICINE

## 2024-11-22 PROCEDURE — 85025 COMPLETE CBC W/AUTO DIFF WBC: CPT | Performed by: INTERNAL MEDICINE

## 2024-11-22 PROCEDURE — 84443 ASSAY THYROID STIM HORMONE: CPT | Performed by: INTERNAL MEDICINE

## 2024-11-22 PROCEDURE — 80053 COMPREHEN METABOLIC PANEL: CPT | Performed by: INTERNAL MEDICINE

## 2024-12-02 DIAGNOSIS — E78.5 HYPERLIPIDEMIA, UNSPECIFIED HYPERLIPIDEMIA TYPE: ICD-10-CM

## 2024-12-03 RX ORDER — ATORVASTATIN CALCIUM 20 MG/1
20 TABLET, FILM COATED ORAL NIGHTLY
Qty: 90 TABLET | Refills: 1 | Status: SHIPPED | OUTPATIENT
Start: 2024-12-03

## 2024-12-03 NOTE — TELEPHONE ENCOUNTER
Cholesterol Medication Protocol Cjlrad9112/02/2024 03:32 AM   Protocol Details ALT < 80    ALT resulted within past year    Lipid panel within past 12 months    In person appointment or virtual visit in the past 12 mos or appointment in next 3 mos      3. Hyperlipidemia, unspecified hyperlipidemia type  Continue statin.   No future appointments.    Return in about 6 months (around 4/23/2025) for med check.

## 2024-12-10 ENCOUNTER — LAB ENCOUNTER (OUTPATIENT)
Dept: LAB | Age: 49
End: 2024-12-10
Attending: INTERNAL MEDICINE
Payer: COMMERCIAL

## 2024-12-10 PROCEDURE — 82728 ASSAY OF FERRITIN: CPT | Performed by: INTERNAL MEDICINE

## 2024-12-10 PROCEDURE — 82746 ASSAY OF FOLIC ACID SERUM: CPT | Performed by: INTERNAL MEDICINE

## 2024-12-10 PROCEDURE — 83540 ASSAY OF IRON: CPT | Performed by: INTERNAL MEDICINE

## 2024-12-10 PROCEDURE — 83550 IRON BINDING TEST: CPT | Performed by: INTERNAL MEDICINE

## 2024-12-10 PROCEDURE — 82607 VITAMIN B-12: CPT | Performed by: INTERNAL MEDICINE

## 2024-12-11 DIAGNOSIS — D64.9 ANEMIA, UNSPECIFIED TYPE: Primary | ICD-10-CM

## 2025-02-26 ENCOUNTER — LAB ENCOUNTER (OUTPATIENT)
Dept: LAB | Age: 50
End: 2025-02-26
Attending: INTERNAL MEDICINE
Payer: COMMERCIAL

## 2025-02-26 LAB
BASOPHILS # BLD AUTO: 0.08 X10(3) UL (ref 0–0.2)
BASOPHILS NFR BLD AUTO: 1.1 %
EOSINOPHIL # BLD AUTO: 0.14 X10(3) UL (ref 0–0.7)
EOSINOPHIL NFR BLD AUTO: 2 %
ERYTHROCYTE [DISTWIDTH] IN BLOOD BY AUTOMATED COUNT: 15.9 %
HCT VFR BLD AUTO: 38.4 %
HGB BLD-MCNC: 12.9 G/DL
IMM GRANULOCYTES # BLD AUTO: 0.04 X10(3) UL (ref 0–1)
IMM GRANULOCYTES NFR BLD: 0.6 %
LYMPHOCYTES # BLD AUTO: 1.62 X10(3) UL (ref 1–4)
LYMPHOCYTES NFR BLD AUTO: 23.2 %
MCH RBC QN AUTO: 32.4 PG (ref 26–34)
MCHC RBC AUTO-ENTMCNC: 33.6 G/DL (ref 31–37)
MCV RBC AUTO: 96.5 FL
MONOCYTES # BLD AUTO: 0.56 X10(3) UL (ref 0.1–1)
MONOCYTES NFR BLD AUTO: 8 %
NEUTROPHILS # BLD AUTO: 4.53 X10 (3) UL (ref 1.5–7.7)
NEUTROPHILS # BLD AUTO: 4.53 X10(3) UL (ref 1.5–7.7)
NEUTROPHILS NFR BLD AUTO: 65.1 %
PLATELET # BLD AUTO: 302 10(3)UL (ref 150–450)
RBC # BLD AUTO: 3.98 X10(6)UL
WBC # BLD AUTO: 7 X10(3) UL (ref 4–11)

## 2025-02-26 PROCEDURE — 85025 COMPLETE CBC W/AUTO DIFF WBC: CPT | Performed by: INTERNAL MEDICINE

## 2025-02-27 ENCOUNTER — TELEPHONE (OUTPATIENT)
Dept: INTERNAL MEDICINE CLINIC | Facility: CLINIC | Age: 50
End: 2025-02-27

## 2025-02-27 DIAGNOSIS — E78.5 HYPERLIPIDEMIA, UNSPECIFIED HYPERLIPIDEMIA TYPE: ICD-10-CM

## 2025-02-27 RX ORDER — ATORVASTATIN CALCIUM 20 MG/1
20 TABLET, FILM COATED ORAL NIGHTLY
Qty: 90 TABLET | Refills: 0 | Status: SHIPPED | OUTPATIENT
Start: 2025-02-27

## 2025-02-27 NOTE — TELEPHONE ENCOUNTER
Incoming (mail or fax):  fax  Received from:  [facility]  Documentation given to:  Triage incoming    Pharmacy change for atorvastatin

## 2025-07-22 DIAGNOSIS — I10 ESSENTIAL HYPERTENSION: ICD-10-CM

## 2025-07-22 DIAGNOSIS — E78.5 HYPERLIPIDEMIA, UNSPECIFIED HYPERLIPIDEMIA TYPE: ICD-10-CM

## 2025-07-23 RX ORDER — AMLODIPINE BESYLATE 10 MG/1
10 TABLET ORAL DAILY
Qty: 30 TABLET | Refills: 0 | Status: SHIPPED | OUTPATIENT
Start: 2025-07-23

## 2025-07-23 RX ORDER — ATORVASTATIN CALCIUM 20 MG/1
20 TABLET, FILM COATED ORAL NIGHTLY
Qty: 30 TABLET | Refills: 0 | Status: SHIPPED | OUTPATIENT
Start: 2025-07-23

## 2025-07-23 RX ORDER — METOPROLOL SUCCINATE 50 MG/1
50 TABLET, EXTENDED RELEASE ORAL 2 TIMES DAILY
Qty: 60 TABLET | Refills: 0 | Status: SHIPPED | OUTPATIENT
Start: 2025-07-23

## 2025-07-23 NOTE — TELEPHONE ENCOUNTER
Hypertension Medications Protocol [id number]/22/2025 10:33 AM   Protocol Details CMP or BMP in past 12 months    Last BP reading less than 140/90    In person appointment or virtual visit in the past 12 mos or appointment in next 3 mos    EGFRCR or GFRNAA > 50    Medication is active on med list    Essential hypertension  Continue meds. Refills done.   No future appointments.

## 2025-08-19 ENCOUNTER — TELEPHONE (OUTPATIENT)
Dept: INTERNAL MEDICINE CLINIC | Facility: CLINIC | Age: 50
End: 2025-08-19

## 2025-08-19 DIAGNOSIS — I10 ESSENTIAL HYPERTENSION: ICD-10-CM

## 2025-08-19 DIAGNOSIS — E78.5 HYPERLIPIDEMIA, UNSPECIFIED HYPERLIPIDEMIA TYPE: Primary | ICD-10-CM

## 2025-08-19 DIAGNOSIS — R73.01 IMPAIRED FASTING GLUCOSE: ICD-10-CM

## 2025-08-21 ENCOUNTER — LAB ENCOUNTER (OUTPATIENT)
Dept: LAB | Age: 50
End: 2025-08-21
Attending: HOSPITALIST

## 2025-08-21 LAB
ALBUMIN SERPL-MCNC: 5 G/DL (ref 3.2–4.8)
ALBUMIN/GLOB SERPL: 1.7 (ref 1–2)
ALP LIVER SERPL-CCNC: 80 U/L (ref 45–117)
ALT SERPL-CCNC: 44 U/L (ref 10–49)
ANION GAP SERPL CALC-SCNC: 10 MMOL/L (ref 0–18)
AST SERPL-CCNC: 28 U/L (ref ?–34)
BILIRUB SERPL-MCNC: 1.8 MG/DL (ref 0.3–1.2)
BUN BLD-MCNC: 16 MG/DL (ref 9–23)
CALCIUM BLD-MCNC: 10.3 MG/DL (ref 8.7–10.6)
CHLORIDE SERPL-SCNC: 104 MMOL/L (ref 98–112)
CHOLEST SERPL-MCNC: 189 MG/DL (ref ?–200)
CO2 SERPL-SCNC: 26 MMOL/L (ref 21–32)
CREAT BLD-MCNC: 1.32 MG/DL (ref 0.7–1.3)
EGFRCR SERPLBLD CKD-EPI 2021: 66 ML/MIN/1.73M2 (ref 60–?)
FASTING PATIENT LIPID ANSWER: YES
FASTING STATUS PATIENT QL REPORTED: YES
GLOBULIN PLAS-MCNC: 3 G/DL (ref 2–3.5)
GLUCOSE BLD-MCNC: 128 MG/DL (ref 70–99)
HDLC SERPL-MCNC: 44 MG/DL (ref 40–59)
LDLC SERPL CALC-MCNC: 106 MG/DL (ref ?–100)
NONHDLC SERPL-MCNC: 145 MG/DL (ref ?–130)
OSMOLALITY SERPL CALC.SUM OF ELEC: 293 MOSM/KG (ref 275–295)
POTASSIUM SERPL-SCNC: 4.3 MMOL/L (ref 3.5–5.1)
PROT SERPL-MCNC: 8 G/DL (ref 5.7–8.2)
SODIUM SERPL-SCNC: 140 MMOL/L (ref 136–145)
TRIGL SERPL-MCNC: 224 MG/DL (ref 30–149)
VLDLC SERPL CALC-MCNC: 38 MG/DL (ref 0–30)

## 2025-08-21 PROCEDURE — 82248 BILIRUBIN DIRECT: CPT | Performed by: INTERNAL MEDICINE

## 2025-08-21 PROCEDURE — 80053 COMPREHEN METABOLIC PANEL: CPT | Performed by: INTERNAL MEDICINE

## 2025-08-21 PROCEDURE — 80061 LIPID PANEL: CPT | Performed by: INTERNAL MEDICINE

## 2025-08-22 ENCOUNTER — OFFICE VISIT (OUTPATIENT)
Dept: INTERNAL MEDICINE CLINIC | Facility: CLINIC | Age: 50
End: 2025-08-22

## 2025-08-22 VITALS
DIASTOLIC BLOOD PRESSURE: 76 MMHG | WEIGHT: 180.69 LBS | RESPIRATION RATE: 16 BRPM | HEART RATE: 72 BPM | TEMPERATURE: 97 F | SYSTOLIC BLOOD PRESSURE: 120 MMHG | HEIGHT: 62.5 IN | BODY MASS INDEX: 32.42 KG/M2

## 2025-08-22 DIAGNOSIS — R73.01 IMPAIRED FASTING GLUCOSE: ICD-10-CM

## 2025-08-22 DIAGNOSIS — I77.810 DILATATION OF THORACIC AORTA: ICD-10-CM

## 2025-08-22 DIAGNOSIS — I10 ESSENTIAL HYPERTENSION: Primary | ICD-10-CM

## 2025-08-22 DIAGNOSIS — E78.5 HYPERLIPIDEMIA, UNSPECIFIED HYPERLIPIDEMIA TYPE: ICD-10-CM

## 2025-08-22 DIAGNOSIS — R93.1 AGATSTON CAC SCORE 100-199: ICD-10-CM

## 2025-08-22 DIAGNOSIS — R17 ELEVATED BILIRUBIN: ICD-10-CM

## 2025-08-22 LAB — BILIRUB DIRECT SERPL-MCNC: 0.5 MG/DL (ref ?–0.3)

## 2025-08-22 PROCEDURE — 3078F DIAST BP <80 MM HG: CPT | Performed by: INTERNAL MEDICINE

## 2025-08-22 PROCEDURE — 99214 OFFICE O/P EST MOD 30 MIN: CPT | Performed by: INTERNAL MEDICINE

## 2025-08-22 PROCEDURE — 3008F BODY MASS INDEX DOCD: CPT | Performed by: INTERNAL MEDICINE

## 2025-08-22 PROCEDURE — 3074F SYST BP LT 130 MM HG: CPT | Performed by: INTERNAL MEDICINE

## 2025-08-22 PROCEDURE — G2211 COMPLEX E/M VISIT ADD ON: HCPCS | Performed by: INTERNAL MEDICINE

## (undated) DIAGNOSIS — E78.5 HYPERLIPIDEMIA, UNSPECIFIED HYPERLIPIDEMIA TYPE: ICD-10-CM

## (undated) DIAGNOSIS — I10 BENIGN ESSENTIAL HTN: ICD-10-CM

## (undated) DEVICE — SOLUTION SURG DURA PREP HAZMAT

## (undated) DEVICE — SUTURE PROLENE 4-0 SH

## (undated) DEVICE — SUTURE PROLENE 7-0 CC

## (undated) DEVICE — DRAPE TABLE COVER 44X90 TC-10

## (undated) DEVICE — TRANSPOSAL ULTRAFLEX DUO/QUAD ULTRA CART MANIFOLD

## (undated) DEVICE — TOWEL OR BLU 16X26 STRL

## (undated) DEVICE — 3M™ IOBAN™ 2 ANTIMICROBIAL INCISE DRAPE 6651EZ: Brand: IOBAN™ 2

## (undated) DEVICE — MEDI-VAC SUCTION FINE CAPACITY: Brand: CARDINAL HEALTH

## (undated) DEVICE — ABSORBABLE HEMOSTAT (OXIDIZED REGENERATED CELLULOSE, U.S.P.): Brand: SURGICEL

## (undated) DEVICE — SUTURE SILK 0 FSL

## (undated) DEVICE — GAUZE SPONGES,12 PLY: Brand: CURITY

## (undated) DEVICE — Device

## (undated) DEVICE — PROXIMATE RH ROTATING HEAD SKIN STAPLERS (35 WIDE) CONTAINS 35 STAINLESS STEEL STAPLES: Brand: PROXIMATE

## (undated) DEVICE — HEMOCLIP MED 24 CLIP/CARTRIDGE

## (undated) DEVICE — SUTURE VICRYL 2-0 CT-1

## (undated) DEVICE — BANDAGE ROLL,100% COTTON, 6 PLY, LARGE: Brand: KERLIX

## (undated) DEVICE — SUTURE PROLENE 3-0 SH

## (undated) DEVICE — STERILE POLYISOPRENE POWDER-FREE SURGICAL GLOVES: Brand: PROTEXIS

## (undated) DEVICE — SUTURE SILK 2-0

## (undated) DEVICE — HEMOCLIP HORIZON SM MULTI

## (undated) DEVICE — SUTURE PROLENE 5-0 C-1

## (undated) DEVICE — ECLIPSE GOWN PACK: Brand: MEDLINE INDUSTRIES, INC.

## (undated) DEVICE — CSTM UNIVERSAL DRAPE PK: Brand: MEDLINE INDUSTRIES, INC.

## (undated) DEVICE — MEDI-VAC SUCTION HANDLE REGULAR CAPACITY: Brand: CARDINAL HEALTH

## (undated) DEVICE — SOL  .9 1000ML BTL

## (undated) DEVICE — SUTURE PROLENE 6-0 C-1

## (undated) DEVICE — GLOVE SURG TRIUMPH SZ 8

## (undated) DEVICE — TIBURON DRAPE TOWELS: Brand: CONVERTORS

## (undated) DEVICE — OXYGENATOR QUADROX TAND HEART

## (undated) DEVICE — BARD® PTFE FELT PLEDGETS, (OVAL), 4.8 MM X 6 MM: Brand: BARD® PTFE FELT PLEDGETS

## (undated) DEVICE — CATH RED RUBBER 18FR

## (undated) DEVICE — MEDI-VAC NON-CONDUCTIVE SUCTION TUBING: Brand: CARDINAL HEALTH

## (undated) DEVICE — GLOVE BIOGEL M SURG SZ 8

## (undated) DEVICE — SUTURE PROLENE 4-0 V-5

## (undated) NOTE — LETTER
[address] [facility]     I agree to have a Peripherally Inserted Central Catheter (PICC) placed in my arm.    1. The PICC insertion procedure, care, maintenance, risks, benefits, and complications have been explained to me by my physic also discussed reasonable alternatives to the PICC, including risks, benefits, and side effects related to the alternatives and risks related to not receiving this procedure.     8.  I have expressed any questions about this procedure to my physician or the

## (undated) NOTE — LETTER
05/19/21        [address]      Dear Cammie Cordon,    Just a friendly reminder, our records indicate that you have outstanding lab work and or testing that was ordered for you and has not yet been completed:        CBC [87

## (undated) NOTE — LETTER
June 23, 2020    Patient: Lyndsey Juares   YOB: 1975     Dear Employer,  At [facility], we are taking special precautions and doing everything we can to prevent the spread of COVID-19 (coronavirus disease 2019).  During this time, ? To maximize the safety of employees, employers and clients, we encourage employers to allow self-identified high-risk patients to work from home if it is at all feasible for them to do so.  COVID-19 is especially risky for high-risk patients (including, b

## (undated) NOTE — LETTER
Date: 11/28/2018    Patient Name: Imelda Silva   1/26/1975           To Whom it may concern: This letter has been written at the patient's request. The above patient was seen at the [facility] for treatment of a medical condition.         Sin

## (undated) NOTE — LETTER
08/03/20        Yosi Laguerre  [address] [facility]      Dear Braxton Forman,    [facility] records indicate that you have outstanding lab work and or testing that was ordered for you and has not yet been completed:        CBC W Differential W Platelet

## (undated) NOTE — LETTER
Giselle Barnett [address] [address]  [place], [address] [facility]    Consent for Operation  Date: __________________                                Time: _______________    1.  I authorize the performance upon Lilian Mejia the following operation: placment of  ex procedure has been videotaped, the surgeon will obtain the original videotape. The hospital will not be responsible for storage or maintenance of this tape.   8. For the purpose of advancing medical education, I consent to the admittance of observers to the STATEMENTS REQUIRING INSERTION OR COMPLETION WERE FILLED IN.     Signature of Patient:   ___________________________    When the patient is a minor or mentally incompetent to give consent:  Signature of person authorized to consent for patient: ____________ drugs/illegal medications). Failure to inform my anesthesiologist about these medicines may increase my risk of anesthetic complications. iv. If I am allergic to anything or have had a reaction to anesthesia before.   3. I understand how the anesthesia med I have discussed the procedure and information above with the patient (or patient’s representative) and answered their questions. The patient or their representative has agreed to have anesthesia services.     _______________________________________________

## (undated) NOTE — IP AVS SNAPSHOT
[address]            (For Outpatient Use Only) Initial Admit Date: 4/2/2020   Inpt/Obs Admit Date: Inpt: 4/2/20 / Obs: N/A   Discharge Date:    Shira Goldsmith:  [de-identified]   MRN: [de-identified]   CSN: [id number]   CEID: [id number] Hospital Account Financial Class: Managed Care    May 5, 2020

## (undated) NOTE — IP AVS SNAPSHOT
Patient Demographics     Address  [address] [address] Phone  [phone] [facility]  [phone] [facility] E-mail Address  [email or web address]@"[email or web address] [email or web address]      Emergency Contact(s)     Name Relation Home Work [place], Emely 63 Sister [phone]  [address] [phone], [address] [facility]    Phone:  [phone]   fenofibrate micronized 134 MG Caps     Please  your prescriptions at the location directed by your doctor or nurse    Bring a paper prescription for each Glucose 97 70 - 99 mg/dL [facility])   Sodium 134 136 - 145 mmol/L  [facility])   Potassium 3.6 3.5 - 5.1 mmol/L — [facility])   Chloride 102 98 - 112 mmol/L — [facility] ([facility])   CO2 25.0 21.0 - 32.0 mmol/L — [place] [facility])   [place]ion [place] 7 [address] [facility]) [facility]) Katy Pickett  [address] [id number] 03/19/20 1441 - Present            Microbiology Results (All)     Procedure Component Value Units Date/Time    SARS-CoV- BLD CULT ID PCR [facility] PANEL Once [[id number]] Collected:  04/22/20 1448    Order Status:  Completed Lab Status:  Final result Updated:  04/28/20 6799    Specimen:  Blood,peripheral      NOT Staphylococcus aureus, NOT MRSA by PCR See Comment    Blood Culture Sputum Culture Result 1+ Normal Respiratory Pati isolated     Sputum Smear 3+ WBCs seen      1+ Gram positive cocci in clusters    Blood Culture Once [[id number]] Collected:  04/10/20 1011    Order Status:  Completed Lab Status:  Final result Updated:  0 Specimen:  Blood from Bld,Picc Line      Blood Culture Result No Growth 5 Days    Blood Culture FREQ X 2 [[id number]] Collected:  04/02/20 1642    Order Status:  Completed Lab Status:  Final result Updated:  04/07/20 1800    Specimen:  Blood,peripheral History of Present Illness: Marsha Cobb is a 41year old male with a past medical history of HTN and DL. He has had fevers, cough for the past week. He was here two days ago[FA.1] and tested positive for COVID-19.   He now comes back to the ED due to incre simvastatin 10 MG Oral Tab, TAKE 1 TABLET BY MOUTH NIGHTLY, Disp: 90 tablet, Rfl: 0  ezetimibe 10 MG Oral Tab, Take 1 tablet (10 mg total) by mouth nightly., Disp: 90 tablet, Rfl: 0  Multiple Vitamins-Minerals (MULTIVITAMIN OR), Take 1 tablet by mouth jesika COVID19 Detected (AA) 03/31/2020[FA. 3]       Pro-Calcitonin[FA. 2]  Lab Results   Component Value Date    PCT 0.22 (H) 04/02/2020[FA.3]       Inflammatory Markers[FA. 2]  Recent Labs   Lab 04/02/20  1000   CRP 10.90*   ANJEL 3,493.7*   *  744*    · Fevers, new opacities on chest imaging with concern for developing pneumonia/VAP  · Acute encephalopathy, delirium due to prolonged sedation in setting of severe illness  · ST changes, possible acute MI  · Prolonged QTc -medication induced; improved  · f Related Notes:  Original Note by Ade Shine PT (Physical Therapist) filed at 5/4/2020  2:42 PM        PHYSICAL THERAPY TREATMENT NOTE - INPATIENT    Room Number: 473/473-A     Session: 4   Number of Visits to Meet Established Goals: 6     History re Static Sitting: Fair +  Dynamic Sitting: Fair +           Static Standing: Poor +  Dynamic Standing: Poor +    ACTIVITY TOLERANCE                         O2 WALK                    AM-PAC '6-Clicks' INPATIEN Patient End of Session: Up in chair;Needs met;Call light within reach;RN aware of session/findings; All patient questions and concerns addressed; Discussed recommendations with /    ASSESSMENT   Patient continues to demonstrate defic Related Notes:  Addendum by Shelly Linares PT (Physical Therapist) filed at 5/4/2020  2:43 PM        PHYSICAL THERAPY TREATMENT NOTE - INPATIENT    Room Number: 473/473-A     Session: 4   Number of Visits to Meet Established Goals: 6     History related Filed:  5/4/2020  4:46 PM Date of Service:  5/4/2020  8:50 AM Status:  Addendum    :  Marie Mejía OT (Occupational Therapist)    Related Notes:  Original Note by Marie Mejía OT (Occupational Therapist) filed at 5/4/2020  4:43 PM Location: N/A[KK. 2]        ACTIVITY TOLERANCE                         O2 SATURATIONS                ACTIVITIES OF DAILY LIVING ASSESSMENT  AM-PAC ‘6-Clicks’ Inpatient Daily Activity Short Form  How much help from another person does the patient currently n safety. The patient is below baseline and would benefit from skilled inpatient OT to address the above deficits, maximizing patient's ability to return to prior level of function. [KK.1]    OT Discharge Recommendations: Acute rehabilitation  OT Device Rec Related Notes:  Addendum by Gladys Brian OT (Occupational Therapist) filed at 5/4/2020  4:46 PM       OCCUPATIONAL THERAPY TREATMENT NOTE - INPATIENT     Room Number: 473/473-A  Session: Vince Hanson. 1]   Number of Visits to Meet Established Goals: 10 AM-PAC ‘6-Clicks’ Inpatient Daily Activity Short Form  How much help from another person does the patient currently need…[KK.1]  -   Putting on and taking off regular lower body clothing?: A Little  -   Bathing (including washing, rinsing, drying)?: A Akin OT Device Recommendations: TBD[KK. 2]    PLAN[KK. 1]  OT Treatment Plan: Balance activities; Energy conservation/work simplification techniques;ADL training;Functional transfer training; Endurance training;UE strengthening/ROM; Cognitive reorientation;Patient/F Pt seen for dysphagia tx to assess tolerance with recommended diet, ensure proper utilization of aspiration precautions and provide pt/family education.  Patient seen with recommended diet from 5/3/20 of mechanical soft chopped diet with thin liquids along Goal #1 The patient will tolerate mechanical soft chopped consistency and thin liquids without overt signs or symptoms of aspiration with 95 % accuracy over 1-2 session(s).  Met   Goal #2 The patient/family/caregiver will demonstrate understanding and imple he demonstrated functional oral phase with complete mastication and timely and complete oral clearing. Recommend upgrade of diet to regular solids with thin liquids with continued utilization of aspiration precautions.  Will follow up to ensure safety w Follow Up Needed: Yes  SLP Follow-up Date: 05/05/20  Number of Visits to Meet Established Goals: 3    Session: 2 of 3    If you have any questions, please contact Nora BARRETT[CJ.1]    Electronically signed by RAJESH Garcia on 5/4/2020  3:12 PM   Att

## (undated) NOTE — LETTER
Date & Time: 3/31/2020, 11:49 AM  Patient: Radha Pedraza  Encounter Provider(s):    MD Alejandra Barrow APRN       To Whom It May Concern:    Radha Pedraza was seen and treated in our department on 3/31/2020.  He may return to work after

## (undated) NOTE — LETTER
Date: 12/3/2018      Patient Name: Gilberto Canas   : 1975               To Whom it May Concern:     This letter has been written at the patient's request.     The above patient was seen at the [facility] for treatment of a medical condit